# Patient Record
Sex: MALE | Race: WHITE | Employment: UNEMPLOYED | URBAN - METROPOLITAN AREA
[De-identification: names, ages, dates, MRNs, and addresses within clinical notes are randomized per-mention and may not be internally consistent; named-entity substitution may affect disease eponyms.]

---

## 2017-01-09 ENCOUNTER — APPOINTMENT (OUTPATIENT)
Dept: GENERAL RADIOLOGY | Age: 52
End: 2017-01-09
Attending: EMERGENCY MEDICINE
Payer: COMMERCIAL

## 2017-01-09 ENCOUNTER — HOSPITAL ENCOUNTER (EMERGENCY)
Age: 52
Discharge: HOME | End: 2017-01-09
Attending: EMERGENCY MEDICINE | Admitting: EMERGENCY MEDICINE
Payer: COMMERCIAL

## 2017-01-09 ENCOUNTER — APPOINTMENT (OUTPATIENT)
Dept: CT IMAGING | Age: 52
End: 2017-01-09
Attending: EMERGENCY MEDICINE
Payer: COMMERCIAL

## 2017-01-09 VITALS
OXYGEN SATURATION: 97 % | TEMPERATURE: 98.5 F | HEART RATE: 76 BPM | RESPIRATION RATE: 20 BRPM | HEIGHT: 72 IN | BODY MASS INDEX: 33.62 KG/M2 | WEIGHT: 248.24 LBS | DIASTOLIC BLOOD PRESSURE: 82 MMHG | SYSTOLIC BLOOD PRESSURE: 126 MMHG

## 2017-01-09 DIAGNOSIS — R40.4 TRANSIENT ALTERATION OF AWARENESS: ICD-10-CM

## 2017-01-09 DIAGNOSIS — R07.9 ACUTE CHEST PAIN: ICD-10-CM

## 2017-01-09 DIAGNOSIS — R55 SYNCOPE AND COLLAPSE: Primary | ICD-10-CM

## 2017-01-09 LAB
ALBUMIN SERPL BCP-MCNC: 3.6 G/DL (ref 3.5–5)
ALBUMIN/GLOB SERPL: 1.1 {RATIO} (ref 1.1–2.2)
ALP SERPL-CCNC: 110 U/L (ref 45–117)
ALT SERPL-CCNC: 38 U/L (ref 12–78)
ANION GAP BLD CALC-SCNC: 8 MMOL/L (ref 5–15)
APPEARANCE UR: CLEAR
AST SERPL W P-5'-P-CCNC: 23 U/L (ref 15–37)
BACTERIA URNS QL MICRO: NEGATIVE /HPF
BASOPHILS # BLD AUTO: 0 K/UL (ref 0–0.1)
BASOPHILS # BLD: 0 % (ref 0–1)
BILIRUB SERPL-MCNC: 0.5 MG/DL (ref 0.2–1)
BILIRUB UR QL: NEGATIVE
BUN SERPL-MCNC: 12 MG/DL (ref 6–20)
BUN/CREAT SERPL: 10 (ref 12–20)
CALCIUM SERPL-MCNC: 8.8 MG/DL (ref 8.5–10.1)
CHLORIDE SERPL-SCNC: 103 MMOL/L (ref 97–108)
CK MB CFR SERPL CALC: NORMAL % (ref 0–2.5)
CK MB SERPL-MCNC: <1 NG/ML (ref 5–25)
CK SERPL-CCNC: 106 U/L (ref 39–308)
CK SERPL-CCNC: 107 U/L (ref 39–308)
CO2 SERPL-SCNC: 27 MMOL/L (ref 21–32)
COLOR UR: NORMAL
CREAT SERPL-MCNC: 1.19 MG/DL (ref 0.7–1.3)
EOSINOPHIL # BLD: 0.1 K/UL (ref 0–0.4)
EOSINOPHIL NFR BLD: 1 % (ref 0–7)
EPITH CASTS URNS QL MICRO: NORMAL /LPF
ERYTHROCYTE [DISTWIDTH] IN BLOOD BY AUTOMATED COUNT: 13 % (ref 11.5–14.5)
GLOBULIN SER CALC-MCNC: 3.4 G/DL (ref 2–4)
GLUCOSE SERPL-MCNC: 99 MG/DL (ref 65–100)
GLUCOSE UR STRIP.AUTO-MCNC: NEGATIVE MG/DL
HCT VFR BLD AUTO: 45.1 % (ref 36.6–50.3)
HGB BLD-MCNC: 15.7 G/DL (ref 12.1–17)
HGB UR QL STRIP: NEGATIVE
KETONES UR QL STRIP.AUTO: NEGATIVE MG/DL
LEUKOCYTE ESTERASE UR QL STRIP.AUTO: NEGATIVE
LYMPHOCYTES # BLD AUTO: 22 % (ref 12–49)
LYMPHOCYTES # BLD: 2 K/UL (ref 0.8–3.5)
MCH RBC QN AUTO: 32.2 PG (ref 26–34)
MCHC RBC AUTO-ENTMCNC: 34.8 G/DL (ref 30–36.5)
MCV RBC AUTO: 92.6 FL (ref 80–99)
MONOCYTES # BLD: 0.9 K/UL (ref 0–1)
MONOCYTES NFR BLD AUTO: 10 % (ref 5–13)
NEUTS SEG # BLD: 6 K/UL (ref 1.8–8)
NEUTS SEG NFR BLD AUTO: 67 % (ref 32–75)
NITRITE UR QL STRIP.AUTO: NEGATIVE
PH UR STRIP: 6 [PH] (ref 5–8)
PLATELET # BLD AUTO: 134 K/UL (ref 150–400)
POTASSIUM SERPL-SCNC: 4.2 MMOL/L (ref 3.5–5.1)
PROT SERPL-MCNC: 7 G/DL (ref 6.4–8.2)
PROT UR STRIP-MCNC: NEGATIVE MG/DL
RBC # BLD AUTO: 4.87 M/UL (ref 4.1–5.7)
RBC #/AREA URNS HPF: NORMAL /HPF (ref 0–5)
SODIUM SERPL-SCNC: 138 MMOL/L (ref 136–145)
SP GR UR REFRACTOMETRY: 1.01 (ref 1–1.03)
TROPONIN I SERPL-MCNC: <0.04 NG/ML
TROPONIN I SERPL-MCNC: <0.04 NG/ML
UA: UC IF INDICATED,UAUC: NORMAL
UROBILINOGEN UR QL STRIP.AUTO: 1 EU/DL (ref 0.2–1)
WBC # BLD AUTO: 9 K/UL (ref 4.1–11.1)
WBC URNS QL MICRO: NORMAL /HPF (ref 0–4)

## 2017-01-09 PROCEDURE — 85025 COMPLETE CBC W/AUTO DIFF WBC: CPT | Performed by: EMERGENCY MEDICINE

## 2017-01-09 PROCEDURE — 80053 COMPREHEN METABOLIC PANEL: CPT | Performed by: EMERGENCY MEDICINE

## 2017-01-09 PROCEDURE — 82550 ASSAY OF CK (CPK): CPT | Performed by: EMERGENCY MEDICINE

## 2017-01-09 PROCEDURE — 70450 CT HEAD/BRAIN W/O DYE: CPT

## 2017-01-09 PROCEDURE — 36415 COLL VENOUS BLD VENIPUNCTURE: CPT | Performed by: EMERGENCY MEDICINE

## 2017-01-09 PROCEDURE — 74011000250 HC RX REV CODE- 250: Performed by: EMERGENCY MEDICINE

## 2017-01-09 PROCEDURE — 81001 URINALYSIS AUTO W/SCOPE: CPT | Performed by: EMERGENCY MEDICINE

## 2017-01-09 PROCEDURE — 74011250636 HC RX REV CODE- 250/636: Performed by: EMERGENCY MEDICINE

## 2017-01-09 PROCEDURE — 96374 THER/PROPH/DIAG INJ IV PUSH: CPT

## 2017-01-09 PROCEDURE — 96375 TX/PRO/DX INJ NEW DRUG ADDON: CPT

## 2017-01-09 PROCEDURE — 99285 EMERGENCY DEPT VISIT HI MDM: CPT

## 2017-01-09 PROCEDURE — 71010 XR CHEST PORT: CPT

## 2017-01-09 PROCEDURE — 96361 HYDRATE IV INFUSION ADD-ON: CPT

## 2017-01-09 PROCEDURE — 82553 CREATINE MB FRACTION: CPT | Performed by: EMERGENCY MEDICINE

## 2017-01-09 PROCEDURE — 84484 ASSAY OF TROPONIN QUANT: CPT | Performed by: EMERGENCY MEDICINE

## 2017-01-09 RX ORDER — DIAZEPAM 10 MG/2ML
5 INJECTION INTRAMUSCULAR
Status: COMPLETED | OUTPATIENT
Start: 2017-01-09 | End: 2017-01-09

## 2017-01-09 RX ORDER — PROMETHAZINE HYDROCHLORIDE 25 MG/1
25 TABLET ORAL
Qty: 12 TAB | Refills: 0 | Status: SHIPPED | OUTPATIENT
Start: 2017-01-09 | End: 2017-01-18

## 2017-01-09 RX ORDER — DIAZEPAM 5 MG/1
5 TABLET ORAL
Qty: 20 TAB | Refills: 0 | Status: SHIPPED | OUTPATIENT
Start: 2017-01-09 | End: 2017-01-18

## 2017-01-09 RX ADMIN — SODIUM CHLORIDE 10 MG: 9 INJECTION INTRAMUSCULAR; INTRAVENOUS; SUBCUTANEOUS at 13:58

## 2017-01-09 RX ADMIN — SODIUM CHLORIDE 1000 ML: 900 INJECTION, SOLUTION INTRAVENOUS at 13:58

## 2017-01-09 RX ADMIN — DIAZEPAM 5 MG: 5 INJECTION, SOLUTION INTRAMUSCULAR; INTRAVENOUS at 13:58

## 2017-01-09 NOTE — ED PROVIDER NOTES
HPI Comments: Leslee Louis is a 46 y.o. male, pmhx significant for HTN, HLD, anxiety, who presents via EMS to the ED for evaluation of syncopal episode with associated confusion and 6/10 nonradiating substernal chest tightness x 2 hours PTA. Per pt's wife, the pt lost consciousness and fell forward onto his chest. When he awoke, he was confused, his wife stating that he was unable to remember what had happened or tell them his address. Pt states that his chest tightness began after his syncopal episode. Of note, the pt did not eat anything this morning. Pt specifically denies SOB, nausea, diaphoresis, cough, BLE pain, BLE swelling, vomiting, diarrhea, fever or chills. PCP: Kimberly Martin MD      Social Hx: +tobacco (light smoker), -EtOH, -Illicit Drugs   FHx: significant for heart disease  Medication Allergies: codeine, dilaudid,morphine      There are no other complaints, changes, or physical findings at this time. The history is provided by the patient, the EMS personnel and the spouse. Past Medical History:   Diagnosis Date    Cholelithiasis 8/22/2011    High cholesterol     Hypertension     Other ill-defined conditions(799.89)      hypercholesterolemia    Psychiatric disorder      anxiety    Seasonal allergies     Status post laparoscopic cholecystectomy 8/25/11       Past Surgical History:   Procedure Laterality Date    Hx orthopaedic       back srgery, arm surgery    Pr lap,cholecystectomy  8/25/11    Hx cholecystectomy           Family History:   Problem Relation Age of Onset    Heart Disease Mother     Heart Disease Maternal Grandfather     Heart Disease Paternal Grandmother        Social History     Social History    Marital status:      Spouse name: N/A    Number of children: N/A    Years of education: N/A     Occupational History    Not on file.      Social History Main Topics    Smoking status: Light Tobacco Smoker    Smokeless tobacco: Former User     Quit date: 1/1/2001    Alcohol use No    Drug use: No    Sexual activity: Not on file     Other Topics Concern    Not on file     Social History Narrative    ** Merged History Encounter **              ALLERGIES: Codeine; Dilaudid [hydromorphone (bulk)]; and Morphine    Review of Systems   Constitutional: Negative for chills, diaphoresis and fever. HENT: Negative for congestion. Eyes: Negative. Respiratory: Positive for chest tightness. Negative for cough and shortness of breath. Gastrointestinal: Negative for diarrhea, nausea and vomiting. Endocrine: Negative for heat intolerance. Genitourinary: Negative. Musculoskeletal: Negative for back pain. No BLE pain or swelling    Allergic/Immunologic: Negative for immunocompromised state. Neurological: Positive for syncope. Negative for dizziness. Hematological: Does not bruise/bleed easily. Psychiatric/Behavioral: Negative. All other systems reviewed and are negative. Patient Vitals for the past 12 hrs:   Temp Pulse Resp BP SpO2   01/09/17 1500 - 76 20 126/82 97 %   01/09/17 1454 - 79 11 105/79 97 %   01/09/17 1400 - (!) 107 18 111/83 97 %   01/09/17 1358 - 92 27 125/78 97 %   01/09/17 1357 - 76 16 129/79 96 %   01/09/17 1300 - 83 18 (!) 138/95 95 %   01/09/17 1230 - 75 19 118/81 97 %   01/09/17 1130 - 83 16 116/69 95 %   01/09/17 1117 98.5 °F (36.9 °C) 86 12 111/73 94 %       Physical Exam   Constitutional: He is oriented to person, place, and time. He appears well-developed and well-nourished. No distress (no acute ). HENT:   Head: Normocephalic and atraumatic. Eyes: EOM are normal. Pupils are equal, round, and reactive to light. Neck: Normal range of motion. Neck supple. Cardiovascular: Normal rate, regular rhythm and normal heart sounds. Pulmonary/Chest: Effort normal and breath sounds normal. He has no wheezes. Abdominal: Soft. Bowel sounds are normal. There is no tenderness.    Musculoskeletal: Normal range of motion. He exhibits tenderness (R anterior shin tenderness). He exhibits no edema. Neurological: He is alert and oriented to person, place, and time. No cranial nerve deficit. Skin: Skin is warm and dry. Psychiatric: He has a normal mood and affect. Nursing note and vitals reviewed. MDM  Number of Diagnoses or Management Options  Acute chest pain:   Syncope and collapse:   Transient alteration of awareness:   Diagnosis management comments: DDx: CAD, anemia, arrhythmia, costochondritis, reflux, gastritis, vasovagal, dehydration, electrolyte imbalance         Amount and/or Complexity of Data Reviewed  Clinical lab tests: reviewed and ordered  Tests in the radiology section of CPT®: ordered and reviewed  Obtain history from someone other than the patient: yes (Wife  EMS)  Review and summarize past medical records: yes  Independent visualization of images, tracings, or specimens: yes    Patient Progress  Patient progress: stable        Procedures  3:48 PM  Pt's CP, HA and lightheadedness are gone. He will be d/c. Written by Louise Villanueva ED Scribe as dictated by Paco Kaye MD    LABORATORY TESTS:  Recent Results (from the past 12 hour(s))   CBC WITH AUTOMATED DIFF    Collection Time: 01/09/17 11:39 AM   Result Value Ref Range    WBC 9.0 4.1 - 11.1 K/uL    RBC 4.87 4.10 - 5.70 M/uL    HGB 15.7 12.1 - 17.0 g/dL    HCT 45.1 36.6 - 50.3 %    MCV 92.6 80.0 - 99.0 FL    MCH 32.2 26.0 - 34.0 PG    MCHC 34.8 30.0 - 36.5 g/dL    RDW 13.0 11.5 - 14.5 %    PLATELET 314 (L) 705 - 400 K/uL    NEUTROPHILS 67 32 - 75 %    LYMPHOCYTES 22 12 - 49 %    MONOCYTES 10 5 - 13 %    EOSINOPHILS 1 0 - 7 %    BASOPHILS 0 0 - 1 %    ABS. NEUTROPHILS 6.0 1.8 - 8.0 K/UL    ABS. LYMPHOCYTES 2.0 0.8 - 3.5 K/UL    ABS. MONOCYTES 0.9 0.0 - 1.0 K/UL    ABS. EOSINOPHILS 0.1 0.0 - 0.4 K/UL    ABS.  BASOPHILS 0.0 0.0 - 0.1 K/UL   METABOLIC PANEL, COMPREHENSIVE    Collection Time: 01/09/17 11:39 AM   Result Value Ref Range    Sodium 138 136 - 145 mmol/L    Potassium 4.2 3.5 - 5.1 mmol/L    Chloride 103 97 - 108 mmol/L    CO2 27 21 - 32 mmol/L    Anion gap 8 5 - 15 mmol/L    Glucose 99 65 - 100 mg/dL    BUN 12 6 - 20 MG/DL    Creatinine 1.19 0.70 - 1.30 MG/DL    BUN/Creatinine ratio 10 (L) 12 - 20      GFR est AA >60 >60 ml/min/1.73m2    GFR est non-AA >60 >60 ml/min/1.73m2    Calcium 8.8 8.5 - 10.1 MG/DL    Bilirubin, total 0.5 0.2 - 1.0 MG/DL    ALT 38 12 - 78 U/L    AST 23 15 - 37 U/L    Alk. phosphatase 110 45 - 117 U/L    Protein, total 7.0 6.4 - 8.2 g/dL    Albumin 3.6 3.5 - 5.0 g/dL    Globulin 3.4 2.0 - 4.0 g/dL    A-G Ratio 1.1 1.1 - 2.2     TROPONIN I    Collection Time: 01/09/17 11:39 AM   Result Value Ref Range    Troponin-I, Qt. <0.04 <0.05 ng/mL   CK W/ REFLX CKMB    Collection Time: 01/09/17 11:39 AM   Result Value Ref Range     39 - 308 U/L   URINALYSIS W/ REFLEX CULTURE    Collection Time: 01/09/17 11:39 AM   Result Value Ref Range    Color YELLOW/STRAW      Appearance CLEAR CLEAR      Specific gravity 1.008 1.003 - 1.030      pH (UA) 6.0 5.0 - 8.0      Protein NEGATIVE  NEG mg/dL    Glucose NEGATIVE  NEG mg/dL    Ketone NEGATIVE  NEG mg/dL    Bilirubin NEGATIVE  NEG      Blood NEGATIVE  NEG      Urobilinogen 1.0 0.2 - 1.0 EU/dL    Nitrites NEGATIVE  NEG      Leukocyte Esterase NEGATIVE  NEG      WBC 0-4 0 - 4 /hpf    RBC 0-5 0 - 5 /hpf    Epithelial cells FEW FEW /lpf    Bacteria NEGATIVE  NEG /hpf    UA:UC IF INDICATED CULTURE NOT INDICATED BY UA RESULT CNI     TROPONIN I    Collection Time: 01/09/17  1:56 PM   Result Value Ref Range    Troponin-I, Qt. <0.04 <0.05 ng/mL   CK-MB,QUANT.     Collection Time: 01/09/17  1:56 PM   Result Value Ref Range    CK - MB <1.0 <3.6 NG/ML    CK-MB Index CANNOT BE CALCULATED 0 - 2.5     CK    Collection Time: 01/09/17  1:56 PM   Result Value Ref Range     39 - 308 U/L       IMAGING RESULTS:  CXR Results  (Last 48 hours)               01/09/17 1543  XR CHEST PORT Final result Impression:  Impression:   No acute cardiopulmonary process. Narrative:  Chest AP       History: Chest pain. Dizziness. Comparison: None       Findings: The lungs are well expanded. No focal consolidation, pleural   effusion, or pneumothorax. The cardiomediastinal silhouette is unremarkable. The visualized osseous structures are unremarkable. CT Results  (Last 48 hours)               01/09/17 1430  CT HEAD WO CONT Final result    Impression:  IMPRESSION: No acute abnormality is identified. Narrative:  EXAM:  CT HEAD WO CONT       INDICATION:   syncope/ams single episode at work today confusion       COMPARISON: 2015. TECHNIQUE: Unenhanced CT of the head was performed using 5 mm images. Brain and   bone windows were generated. CT dose reduction was achieved through use of a   standardized protocol tailored for this examination and automatic exposure   control for dose modulation. FINDINGS:   The ventricles and sulci are normal in size, shape and configuration and   midline. There is no significant white matter disease. There is no intracranial   hemorrhage, extra-axial collection, mass, mass effect or midline shift. The   basilar cisterns are open. No acute infarct is identified. The bone windows   demonstrate no abnormalities. The visualized portions of the paranasal sinuses   and mastoid air cells are clear. There are 2 tiny bubbles of gas in the right   cavernous sinus most likely related to IV catheter insertion. MEDICATIONS GIVEN:  Medications   sodium chloride 0.9 % bolus infusion 1,000 mL (1,000 mL IntraVENous New Bag 1/9/17 1358)   diazePAM (VALIUM) injection 5 mg (5 mg IntraVENous Given 1/9/17 1358)   prochlorperazine (COMPAZINE) with saline injection 10 mg (10 mg IntraVENous Given 1/9/17 1358)       IMPRESSION:  1. Syncope and collapse    2. Transient alteration of awareness    3. Acute chest pain        PLAN:  1.    Current Discharge Medication List      START taking these medications    Details   promethazine (PHENERGAN) 25 mg tablet Take 1 Tab by mouth every six (6) hours as needed for Nausea. Qty: 12 Tab, Refills: 0      diazePAM (VALIUM) 5 mg tablet Take 1 Tab by mouth every eight (8) hours as needed. Max Daily Amount: 15 mg.  Qty: 20 Tab, Refills: 0         CONTINUE these medications which have NOT CHANGED    Details   ARIPiprazole (ABILIFY) 20 mg tablet Take 20 mg by mouth daily. escitalopram (LEXAPRO) 20 mg tablet Take 40 mg by mouth daily. gabapentin (NEURONTIN) 300 mg capsule Take 300 mg by mouth three (3) times daily. eszopiclone (LUNESTA) 3 mg tablet Take 3 mg by mouth nightly. acetaminophen (TYLENOL) 500 mg tablet Take 500 mg by mouth nightly as needed for Pain. Indications: ARTHRITIC PAIN      lisinopril-hydrochlorothiazide (PRINZIDE, ZESTORETIC) 20-12.5 mg per tablet Take 1 Tab by mouth daily. 1 tablet daily for 30 days per prescription      meclizine (ANTIVERT) 25 mg tablet Take  by mouth three (3) times daily as needed. rosuvastatin (CRESTOR) 20 mg tablet Take 20 mg by mouth nightly. omeprazole (PRILOSEC) 20 mg capsule Take 20 mg by mouth daily. MULTI-VITAMIN PO Take  by mouth. omega-3 fatty acids-vitamin e (FISH OIL) 1,000 mg Cap Take 4 Caps by mouth daily.          STOP taking these medications       clonazePAM (KLONOPIN) 1 mg tablet Comments:   Reason for Stoppin.   Follow-up Information     Follow up With Details Comments [address], [place] Call in 1 day  [address]  [address] [address] [phone]      Farheen Reddy MD Call in 1 day  [address] [place] [address]      Adan Collier MD In 2 days  [facility]  [phone]      [facility] EMERGENCY DEPT  If symptoms worsen [address] [facility] [address]  [phone]        Return to ED if worse   DISCHARGE NOTE:  4:04 PM  The patient's results have been reviewed with family and/or caregiver. They verbally convey their understanding and agreement of the patient's signs, symptoms, diagnosis, treatment, and prognosis. They additionally agree to follow up as recommended in the discharge instructions or to return to the Emergency Room should the patient's condition change prior to their follow-up appointment. The family and/or caregiver verbally agrees with the care-plan and all of their questions have been answered. The discharge instructions have also been provided to the them along with educational information regarding the patient's diagnosis and a list of reasons why the patient would want to return to the ER prior to their follow-up appointment should their condition change. Written by Sofía Peters ED Scribe, as dictated by Rocío Davalos MD.    This note is prepared by Sofía Peters acting as scribe for MD Rocío Carrasco MD : The scribe's documentation has been prepared under my direction and personally reviewed by me in its entirety. I confirm that the note above accurately reflects all work, treatment, procedures, and medical decision making performed by me.

## 2017-01-09 NOTE — LETTER
[address] 
[facility] EMERGENCY DEPT 
[address] [id number] [phone] Work/School Note Date: 1/9/2017 To Whom It May concern: 
 
Clemente Sánchez was seen and treated today in the emergency room by the following provider(s): 
Attending Provider: Asha Adams MD.   
 
Clemente Sánchez may return to work on 1/10/2017. Sincerely, Asha Adams MD

## 2017-01-09 NOTE — DISCHARGE INSTRUCTIONS
Chest Pain: Care Instructions  Your Care Instructions  There are many things that can cause chest pain. Some are not serious and will get better on their own in a few days. But some kinds of chest pain need more testing and treatment. Your doctor may have recommended a follow-up visit in the next 8 to 12 hours. If you are not getting better, you may need more tests or treatment. Even though your doctor has released you, you still need to watch for any problems. The doctor carefully checked you, but sometimes problems can develop later. If you have new symptoms or if your symptoms do not get better, get medical care right away. If you have worse or different chest pain or pressure that lasts more than 5 minutes or you passed out (lost consciousness), call 911 or seek other emergency help right away. A medical visit is only one step in your treatment. Even if you feel better, you still need to do what your doctor recommends, such as going to all suggested follow-up appointments and taking medicines exactly as directed. This will help you recover and help prevent future problems. How can you care for yourself at home? · Rest until you feel better. · Take your medicine exactly as prescribed. Call your doctor if you think you are having a problem with your medicine. · Do not drive after taking a prescription pain medicine. When should you call for help? Call 911 if:  · You passed out (lost consciousness). · You have severe difficulty breathing. · You have symptoms of a heart attack. These may include:  ¨ Chest pain or pressure, or a strange feeling in your chest.  ¨ Sweating. ¨ Shortness of breath. ¨ Nausea or vomiting. ¨ Pain, pressure, or a strange feeling in your back, neck, jaw, or upper belly or in one or both shoulders or arms. ¨ Lightheadedness or sudden weakness. ¨ A fast or irregular heartbeat.   After you call 911, the  may tell you to chew 1 adult-strength or 2 to 4 low-dose aspirin. Wait for an ambulance. Do not try to drive yourself. Call your doctor today if:  · You have any trouble breathing. · Your chest pain gets worse. · You are dizzy or lightheaded, or you feel like you may faint. · You are not getting better as expected. · You are having new or different chest pain. Where can you learn more? Go to http://maida-mike.info/. Enter A120 in the search box to learn more about \"Chest Pain: Care Instructions. \"  Current as of: May 27, 2016  Content Version: 11.1  © 0115-2007 [employer]. Care instructions adapted under license by [employer] (which disclaims liability or warranty for this information). If you have questions about a medical condition or this instruction, always ask your healthcare professional. [address] any warranty or liability for your use of this information. Fainting: Care Instructions  Your Care Instructions    When you faint, or pass out, you lose consciousness for a short time. A brief drop in blood flow to the brain often causes it. When you fall or lie down, more blood flows to your brain and you regain consciousness. Emotional stress, pain, or overheating--especially if you have been standing--can make you faint. In these cases, fainting is usually not serious. But fainting can be a sign of a more serious problem. Your doctor may want you to have more tests to rule out other causes. The treatment you need depends on the reason why you fainted. The doctor has checked you carefully, but problems can develop later. If you notice any problems or new symptoms, get medical treatment right away. Follow-up care is a key part of your treatment and safety. Be sure to make and go to all appointments, and call your doctor if you are having problems. It's also a good idea to know your test results and keep a list of the medicines you take.   How can you care for yourself at home?  · Drink plenty of fluids to prevent dehydration. If you have kidney, heart, or liver disease and have to limit fluids, talk with your doctor before you increase your fluid intake. When should you call for help? Call 911 anytime you think you may need emergency care. For example, call if:  · You have symptoms of a heart problem. These may include:  ¨ Chest pain or pressure. ¨ Severe trouble breathing. ¨ A fast or irregular heartbeat. ¨ Lightheadedness or sudden weakness. ¨ Coughing up pink, foamy mucus. ¨ Passing out. After you call 911, the  may tell you to chew 1 adult-strength or 2 to 4 low-dose aspirin. Wait for an ambulance. Do not try to drive yourself. · You have symptoms of a stroke. These may include:  ¨ Sudden numbness, tingling, weakness, or loss of movement in your face, arm, or leg, especially on only one side of your body. ¨ Sudden vision changes. ¨ Sudden trouble speaking. ¨ Sudden confusion or trouble understanding simple statements. ¨ Sudden problems with walking or balance. ¨ A sudden, severe headache that is different from past headaches. · You passed out (lost consciousness) again. Watch closely for changes in your health, and be sure to contact your doctor if:  · You do not get better as expected. Where can you learn more? Go to http://maida-mike.info/. Enter L603 in the search box to learn more about \"Fainting: Care Instructions. \"  Current as of: May 27, 2016  Content Version: 11.1  © 4478-5685 [employer]. Care instructions adapted under license by [employer] (which disclaims liability or warranty for this information). If you have questions about a medical condition or this instruction, always ask your healthcare professional. [address] any warranty or liability for your use of this information.

## 2017-01-09 NOTE — ED TRIAGE NOTES
Per EMS pt was at work when he did have a witnessed syncopal episode. Pt now alert and oriented x 2. Per EMS pts VSS en route to ED. pts BS=92 en route. Pt noted to be in NSR en route to ED per EMS. Upon arrival to ED pt alert to person and place. Confusion noted. Wife at bedside with pt. No acute distress noted.

## 2017-01-18 ENCOUNTER — OFFICE VISIT (OUTPATIENT)
Dept: NEUROLOGY | Age: 52
End: 2017-01-18

## 2017-01-18 VITALS
BODY MASS INDEX: 33.67 KG/M2 | WEIGHT: 248.6 LBS | HEIGHT: 72 IN | DIASTOLIC BLOOD PRESSURE: 80 MMHG | HEART RATE: 102 BPM | OXYGEN SATURATION: 98 % | SYSTOLIC BLOOD PRESSURE: 120 MMHG

## 2017-01-18 DIAGNOSIS — R40.4 TRANSIENT ALTERATION OF AWARENESS: Primary | ICD-10-CM

## 2017-01-18 NOTE — PROGRESS NOTES
NEUROLOGY NEW PATIENT CONSULATION     REFERRED BY:  Odalis Youssef MD    01/18/17    Chief Complaint   Patient presents with    New Patient     Passed out on 1/9/2017       HISTORY OF PRESENT ILLNESS  Wally Banuelos is a 46 y.o. male who presented to the neurology office for management of loss of consciousness. He did have an episode on 5/9/2017. He was at work, and he did not know that he is going to lose his consciousness but he fell down on his chest.  It just lasted for a few seconds and he was confused for 10 minutes and then he was back to his baseline. By confused he states that he was dazed as to what happened but he knew the people around and he knew where he was. He was taken to the emergency room where blood work was done which was normal.  There was no bladder or bowel incontinence or any tongue bite. He is back to his baseline. He did have CT scan of his head which was normal.    Current Outpatient Prescriptions   Medication Sig    gabapentin (NEURONTIN) 300 mg capsule Take 300 mg by mouth three (3) times daily.  eszopiclone (LUNESTA) 3 mg tablet Take 3 mg by mouth nightly.  acetaminophen (TYLENOL) 500 mg tablet Take 500 mg by mouth nightly as needed for Pain. Indications: ARTHRITIC PAIN    lisinopril-hydrochlorothiazide (PRINZIDE, ZESTORETIC) 20-12.5 mg per tablet Take 1 Tab by mouth daily. 1 tablet daily for 30 days per prescription    ARIPiprazole (ABILIFY) 20 mg tablet Take 20 mg by mouth daily.  meclizine (ANTIVERT) 25 mg tablet Take  by mouth three (3) times daily as needed.  escitalopram (LEXAPRO) 20 mg tablet Take 40 mg by mouth daily.  rosuvastatin (CRESTOR) 20 mg tablet Take 20 mg by mouth nightly.  omeprazole (PRILOSEC) 20 mg capsule Take 20 mg by mouth daily.  MULTI-VITAMIN PO Take  by mouth.  omega-3 fatty acids-vitamin e (FISH OIL) 1,000 mg Cap Take 4 Caps by mouth daily. No current facility-administered medications for this visit.       Allergies Allergen Reactions    Codeine Nausea and Vomiting    Dilaudid [Hydromorphone (Bulk)] Nausea and Vomiting     Caused N&V when given in the hospital    Morphine Rash     Past Medical History   Diagnosis Date    Cholelithiasis 8/22/2011    High cholesterol     Hypertension     Other ill-defined conditions(799.89)      hypercholesterolemia    Psychiatric disorder      anxiety    Seasonal allergies     Status post laparoscopic cholecystectomy 8/25/11     Past Surgical History   Procedure Laterality Date    Hx orthopaedic       back srgery, arm surgery    Pr lap,cholecystectomy  8/25/11    Hx cholecystectomy       Family History   Problem Relation Age of Onset    Heart Disease Mother     Headache Mother     Heart Disease Maternal Grandfather     Heart Disease Paternal Grandmother     Headache Sister     Headache Brother      Social History   Substance Use Topics    Smoking status: Light Tobacco Smoker    Smokeless tobacco: Former User     Quit date: 1/1/2001    Alcohol use No       REVIEW OF SYSTEMS:   A ten system review of constitutional, cardiovascular, respiratory, musculoskeletal, endocrine, skin, SHEENT, genitourinary, psychiatric and neurologic systems was obtained and is unremarkable with the exception of the following: Anxiety, depression, vertigo     EXAMINATION:   Visit Vitals    /80    Pulse (!) 102    Ht 6' (1.829 m)    Wt 248 lb 9.6 oz (112.8 kg)    SpO2 98%    BMI 33.72 kg/m2        General:   General appearance: Pt is in no acute distress   Distal pulses are preserved  Fundoscopic Exam: Normal    Neurological Examination:   Mental Status: AAO x3. Speech is fluent. Follows commands, has normal fund of knowledge, attention, short term recall, comprehension and insight. Cranial Nerves: Visual fields are full. PERRL, Extraocular movements are full. Facial sensation intact V1- V3. Facial movement intact, symmetric. Hearing intact to conversation. Palate elevates symmetrically. Shoulder shrug symmetric. Tongue midline. Motor: Strength is 5/5 in all 4 ext. No atrophy. Tone: Normal    Sensation: Normal to light touch    Reflexes: DTRs 1+ throughout. Plantar responses downgoing. Coordination/Cerebellar: Intact to finger-nose-finger     Gait: Romberg is negative and casual gait is normal.     Skin: No significant bruising or lacerations. Laboratory review:   Results for orders placed or performed during the hospital encounter of 01/09/17   CBC WITH AUTOMATED DIFF   Result Value Ref Range    WBC 9.0 4.1 - 11.1 K/uL    RBC 4.87 4.10 - 5.70 M/uL    HGB 15.7 12.1 - 17.0 g/dL    HCT 45.1 36.6 - 50.3 %    MCV 92.6 80.0 - 99.0 FL    MCH 32.2 26.0 - 34.0 PG    MCHC 34.8 30.0 - 36.5 g/dL    RDW 13.0 11.5 - 14.5 %    PLATELET 160 (L) 025 - 400 K/uL    NEUTROPHILS 67 32 - 75 %    LYMPHOCYTES 22 12 - 49 %    MONOCYTES 10 5 - 13 %    EOSINOPHILS 1 0 - 7 %    BASOPHILS 0 0 - 1 %    ABS. NEUTROPHILS 6.0 1.8 - 8.0 K/UL    ABS. LYMPHOCYTES 2.0 0.8 - 3.5 K/UL    ABS. MONOCYTES 0.9 0.0 - 1.0 K/UL    ABS. EOSINOPHILS 0.1 0.0 - 0.4 K/UL    ABS. BASOPHILS 0.0 0.0 - 0.1 K/UL   METABOLIC PANEL, COMPREHENSIVE   Result Value Ref Range    Sodium 138 136 - 145 mmol/L    Potassium 4.2 3.5 - 5.1 mmol/L    Chloride 103 97 - 108 mmol/L    CO2 27 21 - 32 mmol/L    Anion gap 8 5 - 15 mmol/L    Glucose 99 65 - 100 mg/dL    BUN 12 6 - 20 MG/DL    Creatinine 1.19 0.70 - 1.30 MG/DL    BUN/Creatinine ratio 10 (L) 12 - 20      GFR est AA >60 >60 ml/min/1.73m2    GFR est non-AA >60 >60 ml/min/1.73m2    Calcium 8.8 8.5 - 10.1 MG/DL    Bilirubin, total 0.5 0.2 - 1.0 MG/DL    ALT 38 12 - 78 U/L    AST 23 15 - 37 U/L    Alk.  phosphatase 110 45 - 117 U/L    Protein, total 7.0 6.4 - 8.2 g/dL    Albumin 3.6 3.5 - 5.0 g/dL    Globulin 3.4 2.0 - 4.0 g/dL    A-G Ratio 1.1 1.1 - 2.2     TROPONIN I   Result Value Ref Range    Troponin-I, Qt. <0.04 <0.05 ng/mL   CK W/ REFLX CKMB   Result Value Ref Range     39 - 308 U/L   URINALYSIS W/ REFLEX CULTURE   Result Value Ref Range    Color YELLOW/STRAW      Appearance CLEAR CLEAR      Specific gravity 1.008 1.003 - 1.030      pH (UA) 6.0 5.0 - 8.0      Protein NEGATIVE  NEG mg/dL    Glucose NEGATIVE  NEG mg/dL    Ketone NEGATIVE  NEG mg/dL    Bilirubin NEGATIVE  NEG      Blood NEGATIVE  NEG      Urobilinogen 1.0 0.2 - 1.0 EU/dL    Nitrites NEGATIVE  NEG      Leukocyte Esterase NEGATIVE  NEG      WBC 0-4 0 - 4 /hpf    RBC 0-5 0 - 5 /hpf    Epithelial cells FEW FEW /lpf    Bacteria NEGATIVE  NEG /hpf    UA:UC IF INDICATED CULTURE NOT INDICATED BY UA RESULT CNI     TROPONIN I   Result Value Ref Range    Troponin-I, Qt. <0.04 <0.05 ng/mL   CK-MB,QUANT. Result Value Ref Range    CK - MB <1.0 <3.6 NG/ML    CK-MB Index CANNOT BE CALCULATED 0 - 2.5     CK   Result Value Ref Range     39 - 308 U/L       Imaging review:  1/9/2017  X-ray chest  No acute cardiopulmonary process. CT head  No acute abnormality is identified. Documentation review:  The patient was seen in the emergency room on 1/9/2017 and I did review the notes. According to Dr. Danae Parker patient presented to the ED for evaluation of syncopal episode with associated confusion and nonradiating substernal chest tightness that lasted for 2 hours. The patient's blood work was normal and examination was normal as well. The patient's x-ray was normal and a CT scan of the head was normal.  Patient was given Valium and Compazine. Patient was diagnosed as syncope and collapse and was discharged from the hospital.    Assessment/Plan:   Nataliya Dumont is a 46 y.o. male who presented to the neurology office for management of loss of consciousness at work. There was no aura and the loss of consciousness lasted just for a few seconds and there was no postictal state. This semiology is not consistent with a seizure and the etiology for his syncope seems to be unclear.   I will get an EEG to look for any epileptiform discharges. He did see a cardiologist yesterday and he states that everything was normal.  I do plan to get the records from the cardiologist.    The patient can resume work but I did discuss with him that according to the [place] law he cannot drive for the next 6 months. Follow-up in 3 months. Thank you for allowing me to participate in the care of Mr. Gayle Winston. Please feel free to contact me if you have any questions. Babita Frye MD  Neurologist and Clinical Neurophysiologist    CC: Harper Mosquera MD  Fax: [phone]    This note will not be viewable in [address]

## 2017-01-18 NOTE — LETTER
1/18/2017 8:35 AM 
 
Patient:    Cass Kirkpatrick YOB: 1965 Date of Visit:    1/18/2017 Dear Regine Richardson MD 
 
Thank you for referring Mr. Cass Kirkpatrick to me for evaluation/treatment. Below are the relevant portions of my assessment and plan of care. NEUROLOGY NEW PATIENT CONSULATION  
 
REFERRED BY: 
Regine Richardson MD 
 
01/18/17 Chief Complaint Patient presents with  New Patient Passed out on 1/9/2017 HISTORY OF PRESENT ILLNESS 
Cass Kirkpatrick is a 46 y.o. male who presented to the neurology office for management of loss of consciousness. He did have an episode on 5/9/2017. He was at work, and he did not know that he is going to lose his consciousness but he fell down on his chest.  It just lasted for a few seconds and he was confused for 10 minutes and then he was back to his baseline. By confused he states that he was dazed as to what happened but he knew the people around and he knew where he was. He was taken to the emergency room where blood work was done which was normal.  There was no bladder or bowel incontinence or any tongue bite. He is back to his baseline. He did have CT scan of his head which was normal. 
 
Current Outpatient Prescriptions Medication Sig  
 gabapentin (NEURONTIN) 300 mg capsule Take 300 mg by mouth three (3) times daily.  eszopiclone (LUNESTA) 3 mg tablet Take 3 mg by mouth nightly.  acetaminophen (TYLENOL) 500 mg tablet Take 500 mg by mouth nightly as needed for Pain. Indications: ARTHRITIC PAIN  
 lisinopril-hydrochlorothiazide (PRINZIDE, ZESTORETIC) 20-12.5 mg per tablet Take 1 Tab by mouth daily. 1 tablet daily for 30 days per prescription  ARIPiprazole (ABILIFY) 20 mg tablet Take 20 mg by mouth daily.  meclizine (ANTIVERT) 25 mg tablet Take  by mouth three (3) times daily as needed.  escitalopram (LEXAPRO) 20 mg tablet Take 40 mg by mouth daily.  rosuvastatin (CRESTOR) 20 mg tablet Take 20 mg by mouth nightly.  omeprazole (PRILOSEC) 20 mg capsule Take 20 mg by mouth daily.  MULTI-VITAMIN PO Take  by mouth.  omega-3 fatty acids-vitamin e (FISH OIL) 1,000 mg Cap Take 4 Caps by mouth daily. No current facility-administered medications for this visit. Allergies Allergen Reactions  Codeine Nausea and Vomiting  Dilaudid [Hydromorphone (Bulk)] Nausea and Vomiting Caused N&V when given in the hospital  
 Morphine Rash Past Medical History Diagnosis Date  Cholelithiasis 8/22/2011  High cholesterol  Hypertension  Other ill-defined conditions(799.89)   
  hypercholesterolemia  Psychiatric disorder   
  anxiety  Seasonal allergies  Status post laparoscopic cholecystectomy 8/25/11 Past Surgical History Procedure Laterality Date  Hx orthopaedic    
  back srgery, arm surgery  Pr lap,cholecystectomy  8/25/11  
 Hx cholecystectomy Family History Problem Relation Age of Onset  Heart Disease Mother  Headache Mother  Heart Disease Maternal Grandfather  Heart Disease Paternal Grandmother  Headache Sister  Headache Brother Social History Substance Use Topics  Smoking status: Light Tobacco Smoker  Smokeless tobacco: Former User Quit date: 1/1/2001  Alcohol use No  
 
 
REVIEW OF SYSTEMS:  
A ten system review of constitutional, cardiovascular, respiratory, musculoskeletal, endocrine, skin, SHEENT, genitourinary, psychiatric and neurologic systems was obtained and is unremarkable with the exception of the following: Anxiety, depression, vertigo EXAMINATION:  
Visit Vitals  /80  Pulse (!) 102  Ht 6' (1.829 m)  Wt 248 lb 9.6 oz (112.8 kg)  SpO2 98%  BMI 33.72 kg/m2 General:  
General appearance: Pt is in no acute distress Distal pulses are preserved Fundoscopic Exam: Normal 
 
Neurological Examination: Mental Status: AAO x3. Speech is fluent. Follows commands, has normal fund of knowledge, attention, short term recall, comprehension and insight. Cranial Nerves: Visual fields are full. PERRL, Extraocular movements are full. Facial sensation intact V1- V3. Facial movement intact, symmetric. Hearing intact to conversation. Palate elevates symmetrically. Shoulder shrug symmetric. Tongue midline. Motor: Strength is 5/5 in all 4 ext. No atrophy. Tone: Normal 
 
Sensation: Normal to light touch Reflexes: DTRs 1+ throughout. Plantar responses downgoing. Coordination/Cerebellar: Intact to finger-nose-finger Gait: Romberg is negative and casual gait is normal.  
 
Skin: No significant bruising or lacerations. Laboratory review:  
Results for orders placed or performed during the hospital encounter of 01/09/17 CBC WITH AUTOMATED DIFF Result Value Ref Range WBC 9.0 4.1 - 11.1 K/uL  
 RBC 4.87 4.10 - 5.70 M/uL  
 HGB 15.7 12.1 - 17.0 g/dL HCT 45.1 36.6 - 50.3 % MCV 92.6 80.0 - 99.0 FL  
 MCH 32.2 26.0 - 34.0 PG  
 MCHC 34.8 30.0 - 36.5 g/dL  
 RDW 13.0 11.5 - 14.5 % PLATELET 844 (L) 152 - 400 K/uL NEUTROPHILS 67 32 - 75 % LYMPHOCYTES 22 12 - 49 % MONOCYTES 10 5 - 13 % EOSINOPHILS 1 0 - 7 % BASOPHILS 0 0 - 1 %  
 ABS. NEUTROPHILS 6.0 1.8 - 8.0 K/UL  
 ABS. LYMPHOCYTES 2.0 0.8 - 3.5 K/UL  
 ABS. MONOCYTES 0.9 0.0 - 1.0 K/UL  
 ABS. EOSINOPHILS 0.1 0.0 - 0.4 K/UL  
 ABS. BASOPHILS 0.0 0.0 - 0.1 K/UL METABOLIC PANEL, COMPREHENSIVE Result Value Ref Range Sodium 138 136 - 145 mmol/L Potassium 4.2 3.5 - 5.1 mmol/L Chloride 103 97 - 108 mmol/L  
 CO2 27 21 - 32 mmol/L Anion gap 8 5 - 15 mmol/L Glucose 99 65 - 100 mg/dL BUN 12 6 - 20 MG/DL Creatinine 1.19 0.70 - 1.30 MG/DL  
 BUN/Creatinine ratio 10 (L) 12 - 20 GFR est AA >60 >60 ml/min/1.73m2 GFR est non-AA >60 >60 ml/min/1.73m2  Calcium 8.8 8.5 - 10.1 MG/DL  
 Bilirubin, total 0.5 0.2 - 1.0 MG/DL  
 ALT 38 12 - 78 U/L  
 AST 23 15 - 37 U/L Alk. phosphatase 110 45 - 117 U/L Protein, total 7.0 6.4 - 8.2 g/dL Albumin 3.6 3.5 - 5.0 g/dL Globulin 3.4 2.0 - 4.0 g/dL A-G Ratio 1.1 1.1 - 2.2    
TROPONIN I Result Value Ref Range Troponin-I, Qt. <0.04 <0.05 ng/mL CK W/ REFLX CKMB Result Value Ref Range  39 - 308 U/L  
URINALYSIS W/ REFLEX CULTURE Result Value Ref Range Color YELLOW/STRAW Appearance CLEAR CLEAR Specific gravity 1.008 1.003 - 1.030    
 pH (UA) 6.0 5.0 - 8.0 Protein NEGATIVE  NEG mg/dL Glucose NEGATIVE  NEG mg/dL Ketone NEGATIVE  NEG mg/dL Bilirubin NEGATIVE  NEG Blood NEGATIVE  NEG Urobilinogen 1.0 0.2 - 1.0 EU/dL Nitrites NEGATIVE  NEG Leukocyte Esterase NEGATIVE  NEG    
 WBC 0-4 0 - 4 /hpf  
 RBC 0-5 0 - 5 /hpf Epithelial cells FEW FEW /lpf Bacteria NEGATIVE  NEG /hpf  
 UA:UC IF INDICATED CULTURE NOT INDICATED BY UA RESULT CNI    
TROPONIN I Result Value Ref Range Troponin-I, Qt. <0.04 <0.05 ng/mL CK-MB,QUANT. Result Value Ref Range CK - MB <1.0 <3.6 NG/ML  
 CK-MB Index CANNOT BE CALCULATED 0 - 2.5 CK Result Value Ref Range  39 - 308 U/L Imaging review: 
1/9/2017 X-ray chest 
No acute cardiopulmonary process. CT head No acute abnormality is identified. Documentation review: 
The patient was seen in the emergency room on 1/9/2017 and I did review the notes. According to Dr. Ranjana Johns patient presented to the ED for evaluation of syncopal episode with associated confusion and nonradiating substernal chest tightness that lasted for 2 hours. The patient's blood work was normal and examination was normal as well. The patient's x-ray was normal and a CT scan of the head was normal.  Patient was given Valium and Compazine. Patient was diagnosed as syncope and collapse and was discharged from the hospital. 
 
Assessment/Plan: Kari Borden is a 46 y.o. male who presented to the neurology office for management of loss of consciousness at work. There was no aura and the loss of consciousness lasted just for a few seconds and there was no postictal state. This semiology is not consistent with a seizure and the etiology for his syncope seems to be unclear. I will get an EEG to look for any epileptiform discharges. He did see a cardiologist yesterday and he states that everything was normal.  I do plan to get the records from the cardiologist. 
 
The patient can resume work but I did discuss with him that according to the [place] law he cannot drive for the next 6 months. Follow-up in 3 months. Thank you for allowing me to participate in the care of Mr. Pako Reed. Please feel free to contact me if you have any questions. Moose Arevalo MD 
Neurologist and Clinical Neurophysiologist 
 
CC: John Clark MD 
Fax: [phone] This note will not be viewable in [address] If you have questions, please do not hesitate to call me. I look forward to following Mr. Pako Reed along with you. Sincerely, Moose Arevalo MD

## 2017-01-18 NOTE — PATIENT INSTRUCTIONS
1.  EEG of the brain  2. Can resume work on 23 January 2017  3. Follow-up in 3 months      A Healthy Lifestyle: Care Instructions  Your Care Instructions  A healthy lifestyle can help you feel good, stay at a healthy weight, and have plenty of energy for both work and play. A healthy lifestyle is something you can share with your whole family. A healthy lifestyle also can lower your risk for serious health problems, such as high blood pressure, heart disease, and diabetes. You can follow a few steps listed below to improve your health and the health of your family. Follow-up care is a key part of your treatment and safety. Be sure to make and go to all appointments, and call your doctor if you are having problems. Its also a good idea to know your test results and keep a list of the medicines you take. How can you care for yourself at home? · Do not eat too much sugar, fat, or fast foods. You can still have dessert and treats now and then. The goal is moderation. · Start small to improve your eating habits. Pay attention to portion sizes, drink less juice and soda pop, and eat more fruits and vegetables. ¨ Eat a healthy amount of food. A 3-ounce serving of meat, for example, is about the size of a deck of cards. Fill the rest of your plate with vegetables and whole grains. ¨ Limit the amount of soda and sports drinks you have every day. Drink more water when you are thirsty. ¨ Eat at least 5 servings of fruits and vegetables every day. It may seem like a lot, but it is not hard to reach this goal. A serving or helping is 1 piece of fruit, 1 cup of vegetables, or 2 cups of leafy, raw vegetables. Have an apple or some carrot sticks as an afternoon snack instead of a candy bar. Try to have fruits and/or vegetables at every meal.  · Make exercise part of your daily routine. You may want to start with simple activities, such as walking, bicycling, or slow swimming. Try to be active 30 to 60 minutes every day. You do not need to do all 30 to 60 minutes all at once. For example, you can exercise 3 times a day for 10 or 20 minutes. Moderate exercise is safe for most people, but it is always a good idea to talk to your doctor before starting an exercise program.  · Keep moving. [employer] Schooling the lawn, work in the garden, or [employer]. Take the stairs instead of the elevator at work. · If you smoke, quit. People who smoke have an increased risk for heart attack, stroke, cancer, and other lung illnesses. Quitting is hard, but there are ways to boost your chance of quitting tobacco for good. ¨ Use nicotine gum, patches, or lozenges. ¨ Ask your doctor about stop-smoking programs and medicines. ¨ Keep trying. In addition to reducing your risk of diseases in the future, you will notice some benefits soon after you stop using tobacco. If you have shortness of breath or asthma symptoms, they will likely get better within a few weeks after you quit. · Limit how much alcohol you drink. Moderate amounts of alcohol (up to 2 drinks a day for men, 1 drink a day for women) are okay. But drinking too much can lead to liver problems, high blood pressure, and other health problems. Family health  If you have a family, there are many things you can do together to improve your health. · Eat meals together as a family as often as possible. · Eat healthy foods. This includes fruits, vegetables, lean meats and dairy, and whole grains. · Include your family in your fitness plan. Most people think of activities such as jogging or tennis as the way to fitness, but there are many ways you and your family can be more active. Anything that makes you breathe hard and gets your heart pumping is exercise. Here are some tips:  ¨ Walk to do errands or to take your child to school or the bus. ¨ Go for a family bike ride after dinner instead of watching TV. Where can you learn more? Go to http://maida-mike.info/.   Enter D945 in the search box to learn more about \"A Healthy Lifestyle: Care Instructions. \"  Current as of: July 26, 2016  Content Version: 11.1  © 20062289-3423 [employer]. Care instructions adapted under license by [employer] (which disclaims liability or warranty for this information). If you have questions about a medical condition or this instruction, always ask your healthcare professional. [place] [address] any warranty or liability for your use of this information.

## 2017-01-18 NOTE — MR AVS SNAPSHOT
Visit Information Date & Time Provider Department Dept. Phone Encounter #  
 1/18/2017  8:00 AM Sariah Sam MD [facility] [phone] [id number] Upcoming Health Maintenance Date Due Hepatitis C Screening 1965 Pneumococcal 19-64 Medium Risk (1 of 1 - PPSV23) 2/12/1982 DTaP/Tdap/Td series (1 - Tdap) 2/12/1984 FOBT Q 1 YEAR AGE 50-75 2/12/2013 INFLUENZA AGE 9 TO ADULT 8/1/2016 Allergies as of 1/18/2017  Review Complete On: 1/18/2017 By: Sariah Sam MD  
  
 Severity Noted Reaction Type Reactions Codeine  01/31/2011    Nausea and Vomiting Dilaudid [Hydromorphone (Bulk)]  08/25/2011    Nausea and Vomiting Caused N&V when given in the hospital  
 Morphine  01/31/2011    Rash Current Immunizations  Reviewed on 8/24/2011 Name Date Influenza Vaccine Split 1/10/2010 Not reviewed this visit Vitals BP Pulse Height(growth percentile) Weight(growth percentile) SpO2 BMI  
 120/80 (!) 102 6' (1.829 m) 248 lb 9.6 oz (112.8 kg) 98% 33.72 kg/m2 Smoking Status Light Tobacco Smoker BMI and BSA Data Body Mass Index Body Surface Area 33.72 kg/m 2 2.39 m 2 Preferred Pharmacy Pharmacy Name Phone [id number], [address] [place] [id number] [phone] Your Updated Medication List  
  
   
This list is accurate as of: 1/18/17  8:29 AM.  Always use your most recent med list.  
  
  
  
  
 ABILIFY 20 mg tablet Generic drug:  ARIPiprazole Take 20 mg by mouth daily. acetaminophen 500 mg tablet Commonly known as:  TYLENOL Take 500 mg by mouth nightly as needed for Pain. Indications: ARTHRITIC PAIN  
  
 CRESTOR 20 mg tablet Generic drug:  rosuvastatin Take 20 mg by mouth nightly. eszopiclone 3 mg tablet Commonly known as:  Isadore  Take 3 mg by mouth nightly. FISH OIL 1,000 mg Cap Generic drug:  omega-3 fatty acids-vitamin e Take 4 Caps by mouth daily. gabapentin 300 mg capsule Commonly known as:  NEURONTIN Take 300 mg by mouth three (3) times daily. LEXAPRO 20 mg tablet Generic drug:  escitalopram oxalate Take 40 mg by mouth daily. lisinopril-hydroCHLOROthiazide 20-12.5 mg per tablet Commonly known as:  Izola Boots Take 1 Tab by mouth daily. 1 tablet daily for 30 days per prescription  
  
 meclizine 25 mg tablet Commonly known as:  ANTIVERT Take  by mouth three (3) times daily as needed. MULTI-VITAMIN PO Take  by mouth. PriLOSEC 20 mg capsule Generic drug:  omeprazole Take 20 mg by mouth daily. Patient Instructions 1. EEG of the brain 2. Can resume work on 23 January 2017 3. Follow-up in 3 months A Healthy Lifestyle: Care Instructions Your Care Instructions A healthy lifestyle can help you feel good, stay at a healthy weight, and have plenty of energy for both work and play. A healthy lifestyle is something you can share with your whole family. A healthy lifestyle also can lower your risk for serious health problems, such as high blood pressure, heart disease, and diabetes. You can follow a few steps listed below to improve your health and the health of your family. Follow-up care is a key part of your treatment and safety. Be sure to make and go to all appointments, and call your doctor if you are having problems. Its also a good idea to know your test results and keep a list of the medicines you take. How can you care for yourself at home? · Do not eat too much sugar, fat, or fast foods. You can still have dessert and treats now and then. The goal is moderation. · Start small to improve your eating habits. Pay attention to portion sizes, drink less juice and soda pop, and eat more fruits and vegetables. ¨ Eat a healthy amount of food.  A 3-ounce serving of meat, for example, is about the size of a deck of cards. Fill the rest of your plate with vegetables and whole grains. ¨ Limit the amount of soda and sports drinks you have every day. Drink more water when you are thirsty. ¨ Eat at least 5 servings of fruits and vegetables every day. It may seem like a lot, but it is not hard to reach this goal. A serving or helping is 1 piece of fruit, 1 cup of vegetables, or 2 cups of leafy, raw vegetables. Have an apple or some carrot sticks as an afternoon snack instead of a candy bar. Try to have fruits and/or vegetables at every meal. 
· Make exercise part of your daily routine. You may want to start with simple activities, such as walking, bicycling, or slow swimming. Try to be active 30 to 60 minutes every day. You do not need to do all 30 to 60 minutes all at once. For example, you can exercise 3 times a day for 10 or 20 minutes. Moderate exercise is safe for most people, but it is always a good idea to talk to your doctor before starting an exercise program. 
· Keep moving. Mary Bailey the lawn, work in the garden, or [employer]. Take the stairs instead of the elevator at work. · If you smoke, quit. People who smoke have an increased risk for heart attack, stroke, cancer, and other lung illnesses. Quitting is hard, but there are ways to boost your chance of quitting tobacco for good. ¨ Use nicotine gum, patches, or lozenges. ¨ Ask your doctor about stop-smoking programs and medicines. ¨ Keep trying. In addition to reducing your risk of diseases in the future, you will notice some benefits soon after you stop using tobacco. If you have shortness of breath or asthma symptoms, they will likely get better within a few weeks after you quit. · Limit how much alcohol you drink. Moderate amounts of alcohol (up to 2 drinks a day for men, 1 drink a day for women) are okay. But drinking too much can lead to liver problems, high blood pressure, and other health problems. Family health If you have a family, there are many things you can do together to improve your health. · Eat meals together as a family as often as possible. · Eat healthy foods. This includes fruits, vegetables, lean meats and dairy, and whole grains. · Include your family in your fitness plan. Most people think of activities such as jogging or tennis as the way to fitness, but there are many ways you and your family can be more active. Anything that makes you breathe hard and gets your heart pumping is exercise. Here are some tips: 
¨ Walk to do errands or to take your child to school or the bus. ¨ Go for a family bike ride after dinner instead of watching TV. Where can you learn more? Go to http://maida-mike.info/. Enter L471 in the search box to learn more about \"A Healthy Lifestyle: Care Instructions. \" Current as of: July 26, 2016 Content Version: 11.1 © 0949-1872 [employer]. Care instructions adapted under license by [employer] (which disclaims liability or warranty for this information). If you have questions about a medical condition or this instruction, always ask your healthcare professional. [place] [address] any warranty or liability for your use of this information. Introducing [facility]! Morteza Rojas introduces Mindframe patient portal. Now you can access parts of your medical record, email your doctor's office, and request medication refills online. 1. In your internet browser, go to [email or web address] 2. Click on the First Time User? Click Here link in the Sign In box. You will see the New Member Sign Up page. 3. Enter your Mindframe Access Code exactly as it appears below. You will not need to use this code after youve completed the sign-up process. If you do not sign up before the expiration date, you must request a new code. · Mindframe Access Code: [email or web address] Expires: 4/9/2017 10:59 AM 
 
 4. Enter the last four digits of your Social Security Number (xxxx) and Date of Birth (mm/dd/yyyy) as indicated and click Submit. You will be taken to the next sign-up page. 5. Create a Onc[employer] ID. This will be your [employer] login ID and cannot be changed, so think of one that is secure and easy to remember. 6. Create a [employer] password. You can change your password at any time. 7. Enter your Password Reset Question and Answer. This can be used at a later time if you forget your password. 8. Enter your e-mail address. You will receive e-mail notification when new information is available in [address] 9. Click Sign Up. You can now view and download portions of your medical record. 10. Click the Download Summary menu link to download a portable copy of your medical information. If you have questions, please visit the Frequently Asked Questions section of the [employer] website. Remember, [employer] is NOT to be used for urgent needs. For medical emergencies, dial 911. Now available from your iPhone and Android! Please provide this summary of care documentation to your next provider. Your primary care clinician is listed as Marjorie Quintana. If you have any questions after today's visit, please call [phone].

## 2017-01-25 ENCOUNTER — HOSPITAL ENCOUNTER (OUTPATIENT)
Dept: NEUROLOGY | Age: 52
Discharge: HOME | End: 2017-01-25
Attending: PSYCHIATRY & NEUROLOGY
Payer: COMMERCIAL

## 2017-01-25 DIAGNOSIS — R40.4 TRANSIENT ALTERATION OF AWARENESS: ICD-10-CM

## 2017-01-25 PROCEDURE — 95816 EEG AWAKE AND DROWSY: CPT

## 2017-01-26 NOTE — PROCEDURES
Patient Name: Leland Palm  : 1965  Age: 46 y.o. Ordering physician: Carolyn Pickett  Date of EE2017  EEG procedure number: [id number]  Diagnosis: Syncope  Interpreting physician: Crissy Quezada MD      ELECTROENCEPHALOGRAM REPORT     PROCEDURE: EEG. CLINICAL INDICATION: The patient is a 46 y.o. male with a history of   possible seizures. EEG to rule out seizures, rule out stroke, rule out   cortical abnormality. EEG CLASSIFICATION: Essentially normal    DESCRIPTION OF THE RECORD:   The background of this recording contains a posteriorly-located occipital alpha rhythm of 11 Hz that attenuates with eye opening. Throughout the recording, there were no clear areas of focal slowing nor spike or spike-and-wave discharges seen. Hyperventilation was not performed. Photic stimulation produced a minimal driving response in the posterior head regions. During the recording, the patient did enter prolonged states of sleep with K-complexes and sleep spindles seen in the central head regions. INTERPRETATION: This is a normal electroencephalogram showing no clear focal abnormalities or epileptiform activity. A normal EEG doesn't not rule out seizures. Clinical correlation recommended.         Shani Ortiz MD  2017  9:07 PM

## 2017-02-07 ENCOUNTER — DOCUMENTATION ONLY (OUTPATIENT)
Dept: NEUROLOGY | Age: 52
End: 2017-02-07

## 2018-08-10 ENCOUNTER — HOSPITAL ENCOUNTER (EMERGENCY)
Age: 53
Discharge: HOME | End: 2018-08-10
Attending: EMERGENCY MEDICINE
Payer: COMMERCIAL

## 2018-08-10 ENCOUNTER — APPOINTMENT (OUTPATIENT)
Dept: GENERAL RADIOLOGY | Age: 53
End: 2018-08-10
Payer: COMMERCIAL

## 2018-08-10 ENCOUNTER — APPOINTMENT (OUTPATIENT)
Dept: CT IMAGING | Age: 53
End: 2018-08-10
Payer: COMMERCIAL

## 2018-08-10 VITALS
WEIGHT: 230 LBS | SYSTOLIC BLOOD PRESSURE: 116 MMHG | OXYGEN SATURATION: 95 % | HEART RATE: 79 BPM | TEMPERATURE: 97.7 F | DIASTOLIC BLOOD PRESSURE: 77 MMHG | RESPIRATION RATE: 20 BRPM | BODY MASS INDEX: 31.19 KG/M2

## 2018-08-10 DIAGNOSIS — M25.562 PAIN IN LATERAL PORTION OF LEFT KNEE: ICD-10-CM

## 2018-08-10 DIAGNOSIS — M54.2 NECK PAIN: Primary | ICD-10-CM

## 2018-08-10 DIAGNOSIS — M25.512 ACUTE PAIN OF LEFT SHOULDER: ICD-10-CM
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
## 2018-08-10 DIAGNOSIS — M54.50 ACUTE BILATERAL LOW BACK PAIN WITHOUT SCIATICA: ICD-10-CM

## 2018-08-10 LAB
ANION GAP SERPL CALC-SCNC: 5 MMOL/L (ref 5–15)
ATRIAL RATE: 75 BPM
BASOPHILS # BLD: 0.1 K/UL (ref 0–0.1)
BASOPHILS NFR BLD: 1 % (ref 0–1)
BUN SERPL-MCNC: 15 MG/DL (ref 6–20)
BUN/CREAT SERPL: 13 (ref 12–20)
CALCIUM SERPL-MCNC: 8.7 MG/DL (ref 8.5–10.1)
CALCULATED P AXIS, ECG09: 64 DEGREES
CALCULATED R AXIS, ECG10: 73 DEGREES
CALCULATED T AXIS, ECG11: 69 DEGREES
CHLORIDE SERPL-SCNC: 104 MMOL/L (ref 97–108)
CK SERPL-CCNC: 298 U/L (ref 39–308)
CO2 SERPL-SCNC: 26 MMOL/L (ref 21–32)
CREAT SERPL-MCNC: 1.18 MG/DL (ref 0.7–1.3)
DIAGNOSIS, 93000: NORMAL
DIFFERENTIAL METHOD BLD: ABNORMAL
EOSINOPHIL # BLD: 0.2 K/UL (ref 0–0.4)
EOSINOPHIL NFR BLD: 2 % (ref 0–7)
ERYTHROCYTE [DISTWIDTH] IN BLOOD BY AUTOMATED COUNT: 12.7 % (ref 11.5–14.5)
GLUCOSE SERPL-MCNC: 102 MG/DL (ref 65–100)
HCT VFR BLD AUTO: 38.9 % (ref 36.6–50.3)
HGB BLD-MCNC: 14 G/DL (ref 12.1–17)
IMM GRANULOCYTES # BLD: 0.1 K/UL (ref 0–0.04)
IMM GRANULOCYTES NFR BLD AUTO: 1 % (ref 0–0.5)
LYMPHOCYTES # BLD: 2.8 K/UL (ref 0.8–3.5)
LYMPHOCYTES NFR BLD: 27 % (ref 12–49)
MCH RBC QN AUTO: 32 PG (ref 26–34)
MCHC RBC AUTO-ENTMCNC: 36 G/DL (ref 30–36.5)
MCV RBC AUTO: 89 FL (ref 80–99)
MONOCYTES # BLD: 1 K/UL (ref 0–1)
MONOCYTES NFR BLD: 9 % (ref 5–13)
NEUTS SEG # BLD: 6.3 K/UL (ref 1.8–8)
NEUTS SEG NFR BLD: 60 % (ref 32–75)
NRBC # BLD: 0 K/UL (ref 0–0.01)
NRBC BLD-RTO: 0 PER 100 WBC
P-R INTERVAL, ECG05: 210 MS
PLATELET # BLD AUTO: 164 K/UL (ref 150–400)
PMV BLD AUTO: 10.1 FL (ref 8.9–12.9)
POTASSIUM SERPL-SCNC: 4 MMOL/L (ref 3.5–5.1)
Q-T INTERVAL, ECG07: 394 MS
QRS DURATION, ECG06: 90 MS
QTC CALCULATION (BEZET), ECG08: 439 MS
RBC # BLD AUTO: 4.37 M/UL (ref 4.1–5.7)
SODIUM SERPL-SCNC: 135 MMOL/L (ref 136–145)
TROPONIN I SERPL-MCNC: <0.05 NG/ML
VENTRICULAR RATE, ECG03: 75 BPM
WBC # BLD AUTO: 10.5 K/UL (ref 4.1–11.1)

## 2018-08-10 PROCEDURE — 73562 X-RAY EXAM OF KNEE 3: CPT

## 2018-08-10 PROCEDURE — 74011636320 HC RX REV CODE- 636/320: Performed by: EMERGENCY MEDICINE

## 2018-08-10 PROCEDURE — 85025 COMPLETE CBC W/AUTO DIFF WBC: CPT | Performed by: PHYSICIAN ASSISTANT

## 2018-08-10 PROCEDURE — 93005 ELECTROCARDIOGRAM TRACING: CPT

## 2018-08-10 PROCEDURE — 82550 ASSAY OF CK (CPK): CPT | Performed by: PHYSICIAN ASSISTANT

## 2018-08-10 PROCEDURE — 71275 CT ANGIOGRAPHY CHEST: CPT

## 2018-08-10 PROCEDURE — 84484 ASSAY OF TROPONIN QUANT: CPT | Performed by: PHYSICIAN ASSISTANT

## 2018-08-10 PROCEDURE — 72100 X-RAY EXAM L-S SPINE 2/3 VWS: CPT

## 2018-08-10 PROCEDURE — 72125 CT NECK SPINE W/O DYE: CPT

## 2018-08-10 PROCEDURE — 74011250636 HC RX REV CODE- 250/636: Performed by: EMERGENCY MEDICINE

## 2018-08-10 PROCEDURE — 74011250637 HC RX REV CODE- 250/637: Performed by: PHYSICIAN ASSISTANT

## 2018-08-10 PROCEDURE — 99285 EMERGENCY DEPT VISIT HI MDM: CPT

## 2018-08-10 PROCEDURE — 80048 BASIC METABOLIC PNL TOTAL CA: CPT | Performed by: PHYSICIAN ASSISTANT

## 2018-08-10 PROCEDURE — 36415 COLL VENOUS BLD VENIPUNCTURE: CPT | Performed by: PHYSICIAN ASSISTANT

## 2018-08-10 PROCEDURE — 73030 X-RAY EXAM OF SHOULDER: CPT

## 2018-08-10 RX ORDER — SODIUM CHLORIDE 0.9 % (FLUSH) 0.9 %
10 SYRINGE (ML) INJECTION
Status: COMPLETED | OUTPATIENT
Start: 2018-08-10 | End: 2018-08-10

## 2018-08-10 RX ORDER — ONDANSETRON 4 MG/1
4 TABLET, ORALLY DISINTEGRATING ORAL
Status: COMPLETED | OUTPATIENT
Start: 2018-08-10 | End: 2018-08-10

## 2018-08-10 RX ORDER — IBUPROFEN 600 MG/1
600 TABLET ORAL
Qty: 20 TAB | Refills: 0 | Status: SHIPPED | OUTPATIENT
Start: 2018-08-10 | End: 2018-08-30

## 2018-08-10 RX ORDER — OXYCODONE AND ACETAMINOPHEN 5; 325 MG/1; MG/1
1 TABLET ORAL
Qty: 12 TAB | Refills: 0 | Status: SHIPPED | OUTPATIENT
Start: 2018-08-10 | End: 2018-08-22

## 2018-08-10 RX ORDER — SODIUM CHLORIDE 9 MG/ML
50 INJECTION, SOLUTION INTRAVENOUS
Status: COMPLETED | OUTPATIENT
Start: 2018-08-10 | End: 2018-08-10

## 2018-08-10 RX ORDER — OXYCODONE AND ACETAMINOPHEN 5; 325 MG/1; MG/1
1 TABLET ORAL
Status: COMPLETED | OUTPATIENT
Start: 2018-08-10 | End: 2018-08-10

## 2018-08-10 RX ORDER — DIAZEPAM 5 MG/1
5 TABLET ORAL
Status: COMPLETED | OUTPATIENT
Start: 2018-08-10 | End: 2018-08-10

## 2018-08-10 RX ORDER — METHOCARBAMOL 750 MG/1
750 TABLET, FILM COATED ORAL 3 TIMES DAILY
Qty: 15 TAB | Refills: 0 | Status: SHIPPED | OUTPATIENT
Start: 2018-08-10 | End: 2018-08-15

## 2018-08-10 RX ADMIN — SODIUM CHLORIDE 50 ML/HR: 900 INJECTION, SOLUTION INTRAVENOUS at 15:51

## 2018-08-10 RX ADMIN — ONDANSETRON 4 MG: 4 TABLET, ORALLY DISINTEGRATING ORAL at 14:41

## 2018-08-10 RX ADMIN — OXYCODONE HYDROCHLORIDE AND ACETAMINOPHEN 1 TABLET: 5; 325 TABLET ORAL at 14:41

## 2018-08-10 RX ADMIN — Medication 10 ML: at 15:51

## 2018-08-10 RX ADMIN — DIAZEPAM 5 MG: 5 TABLET ORAL at 14:41

## 2018-08-10 RX ADMIN — IOPAMIDOL 100 ML: 755 INJECTION, SOLUTION INTRAVENOUS at 15:51
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
## 2018-08-10 NOTE — ED NOTES
Tramaine Mendosa, [id number] at bedside. Pt log rolled and removed from backboard. Pt then had suspenders removed for comfort.
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
## 2018-08-10 NOTE — ED NOTES
Discharge instructions reviewed with pt. Discharge instructions given to pt per PA. Pt able to return/verbalize discharge instructions. Copy of discharge instructions given. RX given to pt. Pt condition stable, respiratory status within normal limits, neuro status intact. Pt ambulatory, WC offered and pt refused, out of ER, accompanied by wife.
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
## 2019-06-23 ENCOUNTER — HOSPITAL ENCOUNTER (EMERGENCY)
Age: 54
Discharge: HOME | End: 2019-06-24
Attending: EMERGENCY MEDICINE
Payer: MEDICAID

## 2019-06-23 ENCOUNTER — APPOINTMENT (OUTPATIENT)
Dept: GENERAL RADIOLOGY | Age: 54
End: 2019-06-23
Attending: EMERGENCY MEDICINE
Payer: MEDICAID

## 2019-06-23 DIAGNOSIS — R07.89 ATYPICAL CHEST PAIN: Primary | ICD-10-CM

## 2019-06-23 DIAGNOSIS — R11.2 NON-INTRACTABLE VOMITING WITH NAUSEA, UNSPECIFIED VOMITING TYPE: ICD-10-CM

## 2019-06-23 LAB
ALBUMIN SERPL-MCNC: 3.3 G/DL (ref 3.5–5)
ALBUMIN/GLOB SERPL: 1.1 {RATIO} (ref 1.1–2.2)
ALP SERPL-CCNC: 96 U/L (ref 45–117)
ALT SERPL-CCNC: 27 U/L (ref 12–78)
AMPHET UR QL SCN: NEGATIVE
ANION GAP SERPL CALC-SCNC: 7 MMOL/L (ref 5–15)
AST SERPL-CCNC: 16 U/L (ref 15–37)
BARBITURATES UR QL SCN: NEGATIVE
BASOPHILS # BLD: 0.1 K/UL (ref 0–0.1)
BASOPHILS NFR BLD: 1 % (ref 0–1)
BENZODIAZ UR QL: NEGATIVE
BILIRUB SERPL-MCNC: 0.3 MG/DL (ref 0.2–1)
BNP SERPL-MCNC: 27 PG/ML
BUN SERPL-MCNC: 8 MG/DL (ref 6–20)
BUN/CREAT SERPL: 9 (ref 12–20)
CALCIUM SERPL-MCNC: 8 MG/DL (ref 8.5–10.1)
CANNABINOIDS UR QL SCN: NEGATIVE
CHLORIDE SERPL-SCNC: 110 MMOL/L (ref 97–108)
CO2 SERPL-SCNC: 23 MMOL/L (ref 21–32)
COCAINE UR QL SCN: NEGATIVE
CREAT SERPL-MCNC: 0.9 MG/DL (ref 0.7–1.3)
D DIMER PPP FEU-MCNC: 0.39 MG/L FEU (ref 0–0.65)
DIFFERENTIAL METHOD BLD: ABNORMAL
DRUG SCRN COMMENT,DRGCM: NORMAL
EOSINOPHIL # BLD: 0.2 K/UL (ref 0–0.4)
EOSINOPHIL NFR BLD: 3 % (ref 0–7)
ERYTHROCYTE [DISTWIDTH] IN BLOOD BY AUTOMATED COUNT: 13.1 % (ref 11.5–14.5)
GLOBULIN SER CALC-MCNC: 2.9 G/DL (ref 2–4)
GLUCOSE SERPL-MCNC: 113 MG/DL (ref 65–100)
HCT VFR BLD AUTO: 41.2 % (ref 36.6–50.3)
HGB BLD-MCNC: 14.2 G/DL (ref 12.1–17)
IMM GRANULOCYTES # BLD AUTO: 0.1 K/UL (ref 0–0.04)
IMM GRANULOCYTES NFR BLD AUTO: 1 % (ref 0–0.5)
LIPASE SERPL-CCNC: 123 U/L (ref 73–393)
LYMPHOCYTES # BLD: 2.7 K/UL (ref 0.8–3.5)
LYMPHOCYTES NFR BLD: 37 % (ref 12–49)
MCH RBC QN AUTO: 30.9 PG (ref 26–34)
MCHC RBC AUTO-ENTMCNC: 34.5 G/DL (ref 30–36.5)
MCV RBC AUTO: 89.8 FL (ref 80–99)
METHADONE UR QL: NEGATIVE
MONOCYTES # BLD: 0.6 K/UL (ref 0–1)
MONOCYTES NFR BLD: 9 % (ref 5–13)
NEUTS SEG # BLD: 3.5 K/UL (ref 1.8–8)
NEUTS SEG NFR BLD: 49 % (ref 32–75)
NRBC # BLD: 0 K/UL (ref 0–0.01)
NRBC BLD-RTO: 0 PER 100 WBC
OPIATES UR QL: NEGATIVE
PCP UR QL: NEGATIVE
PLATELET # BLD AUTO: 158 K/UL (ref 150–400)
PMV BLD AUTO: 9.9 FL (ref 8.9–12.9)
POTASSIUM SERPL-SCNC: 3.7 MMOL/L (ref 3.5–5.1)
PROT SERPL-MCNC: 6.2 G/DL (ref 6.4–8.2)
RBC # BLD AUTO: 4.59 M/UL (ref 4.1–5.7)
SODIUM SERPL-SCNC: 140 MMOL/L (ref 136–145)
TROPONIN I SERPL-MCNC: <0.05 NG/ML
WBC # BLD AUTO: 7.1 K/UL (ref 4.1–11.1)

## 2019-06-23 PROCEDURE — 80307 DRUG TEST PRSMV CHEM ANLYZR: CPT

## 2019-06-23 PROCEDURE — 74011250636 HC RX REV CODE- 250/636: Performed by: EMERGENCY MEDICINE

## 2019-06-23 PROCEDURE — 96374 THER/PROPH/DIAG INJ IV PUSH: CPT

## 2019-06-23 PROCEDURE — 36415 COLL VENOUS BLD VENIPUNCTURE: CPT

## 2019-06-23 PROCEDURE — 85025 COMPLETE CBC W/AUTO DIFF WBC: CPT

## 2019-06-23 PROCEDURE — 99284 EMERGENCY DEPT VISIT MOD MDM: CPT

## 2019-06-23 PROCEDURE — 71046 X-RAY EXAM CHEST 2 VIEWS: CPT

## 2019-06-23 PROCEDURE — 84484 ASSAY OF TROPONIN QUANT: CPT

## 2019-06-23 PROCEDURE — 83690 ASSAY OF LIPASE: CPT

## 2019-06-23 PROCEDURE — 85379 FIBRIN DEGRADATION QUANT: CPT

## 2019-06-23 PROCEDURE — 83880 ASSAY OF NATRIURETIC PEPTIDE: CPT

## 2019-06-23 PROCEDURE — 80053 COMPREHEN METABOLIC PANEL: CPT

## 2019-06-23 PROCEDURE — 93005 ELECTROCARDIOGRAM TRACING: CPT

## 2019-06-23 RX ORDER — FENTANYL CITRATE 50 UG/ML
50 INJECTION, SOLUTION INTRAMUSCULAR; INTRAVENOUS
Status: COMPLETED | OUTPATIENT
Start: 2019-06-23 | End: 2019-06-23

## 2019-06-23 RX ADMIN — FENTANYL CITRATE 50 MCG: 50 INJECTION, SOLUTION INTRAMUSCULAR; INTRAVENOUS at 22:53

## 2019-06-23 NOTE — LETTER
[address] 
[facility] EMERGENCY DEPT 
[address] [place] [address] [address] [id number] [phone] Work/School Note Date: 6/23/2019 To Whom It May concern: 
 
Maude Bonner was seen and treated today in the emergency room by the following provider(s): 
Attending Provider: Lizette Castillo MD.   
 
Maude Bonner may return to work on 6/25/19.  
 
Sincerely, 
 
 
 
 
Argenis Hines MD

## 2019-06-24 ENCOUNTER — APPOINTMENT (OUTPATIENT)
Dept: CT IMAGING | Age: 54
End: 2019-06-24
Attending: EMERGENCY MEDICINE
Payer: MEDICAID

## 2019-06-24 VITALS
SYSTOLIC BLOOD PRESSURE: 99 MMHG | WEIGHT: 231.48 LBS | RESPIRATION RATE: 15 BRPM | OXYGEN SATURATION: 88 % | BODY MASS INDEX: 31.35 KG/M2 | TEMPERATURE: 95.9 F | DIASTOLIC BLOOD PRESSURE: 71 MMHG | HEIGHT: 72 IN | HEART RATE: 78 BPM

## 2019-06-24 LAB
ATRIAL RATE: 81 BPM
CALCULATED P AXIS, ECG09: 39 DEGREES
CALCULATED R AXIS, ECG10: 19 DEGREES
CALCULATED T AXIS, ECG11: 41 DEGREES
DIAGNOSIS, 93000: NORMAL
P-R INTERVAL, ECG05: 194 MS
Q-T INTERVAL, ECG07: 374 MS
QRS DURATION, ECG06: 96 MS
QTC CALCULATION (BEZET), ECG08: 434 MS
TROPONIN I SERPL-MCNC: <0.05 NG/ML
VENTRICULAR RATE, ECG03: 81 BPM

## 2019-06-24 PROCEDURE — 74177 CT ABD & PELVIS W/CONTRAST: CPT

## 2019-06-24 PROCEDURE — 84484 ASSAY OF TROPONIN QUANT: CPT

## 2019-06-24 PROCEDURE — 74011636320 HC RX REV CODE- 636/320: Performed by: EMERGENCY MEDICINE

## 2019-06-24 PROCEDURE — 36415 COLL VENOUS BLD VENIPUNCTURE: CPT

## 2019-06-24 PROCEDURE — 96361 HYDRATE IV INFUSION ADD-ON: CPT

## 2019-06-24 PROCEDURE — 74011250636 HC RX REV CODE- 250/636: Performed by: EMERGENCY MEDICINE

## 2019-06-24 PROCEDURE — 96375 TX/PRO/DX INJ NEW DRUG ADDON: CPT

## 2019-06-24 RX ORDER — ONDANSETRON 4 MG/1
4 TABLET, ORALLY DISINTEGRATING ORAL
Qty: 10 TAB | Refills: 0 | Status: SHIPPED | OUTPATIENT
Start: 2019-06-24 | End: 2022-05-12

## 2019-06-24 RX ORDER — NAPROXEN 500 MG/1
500 TABLET ORAL
Qty: 20 TAB | Refills: 0 | Status: SHIPPED | OUTPATIENT
Start: 2019-06-24 | End: 2022-08-03

## 2019-06-24 RX ORDER — SODIUM CHLORIDE 0.9 % (FLUSH) 0.9 %
10 SYRINGE (ML) INJECTION
Status: COMPLETED | OUTPATIENT
Start: 2019-06-24 | End: 2019-06-24

## 2019-06-24 RX ORDER — ONDANSETRON 2 MG/ML
4 INJECTION INTRAMUSCULAR; INTRAVENOUS
Status: COMPLETED | OUTPATIENT
Start: 2019-06-24 | End: 2019-06-24

## 2019-06-24 RX ADMIN — Medication 10 ML: at 00:47

## 2019-06-24 RX ADMIN — SODIUM CHLORIDE 500 ML: 900 INJECTION, SOLUTION INTRAVENOUS at 00:47

## 2019-06-24 RX ADMIN — IOPAMIDOL 100 ML: 755 INJECTION, SOLUTION INTRAVENOUS at 00:47

## 2019-06-24 RX ADMIN — ONDANSETRON 4 MG: 2 INJECTION INTRAMUSCULAR; INTRAVENOUS at 00:48

## 2019-06-24 NOTE — DISCHARGE INSTRUCTIONS
Patient Education        Chest Pain: Care Instructions  Your Care Instructions    There are many things that can cause chest pain. Some are not serious and will get better on their own in a few days. But some kinds of chest pain need more testing and treatment. Your doctor may have recommended a follow-up visit in the next 8 to 12 hours. If you are not getting better, you may need more tests or treatment. Even though your doctor has released you, you still need to watch for any problems. The doctor carefully checked you, but sometimes problems can develop later. If you have new symptoms or if your symptoms do not get better, get medical care right away. If you have worse or different chest pain or pressure that lasts more than 5 minutes or you passed out (lost consciousness), call 911 or seek other emergency help right away. A medical visit is only one step in your treatment. Even if you feel better, you still need to do what your doctor recommends, such as going to all suggested follow-up appointments and taking medicines exactly as directed. This will help you recover and help prevent future problems. How can you care for yourself at home? · Rest until you feel better. · Take your medicine exactly as prescribed. Call your doctor if you think you are having a problem with your medicine. · Do not drive after taking a prescription pain medicine. When should you call for help? Call 911 if:    · You passed out (lost consciousness).     · You have severe difficulty breathing.     · You have symptoms of a heart attack. These may include:  ? Chest pain or pressure, or a strange feeling in your chest.  ? Sweating. ? Shortness of breath. ? Nausea or vomiting. ? Pain, pressure, or a strange feeling in your back, neck, jaw, or upper belly or in one or both shoulders or arms. ? Lightheadedness or sudden weakness. ? A fast or irregular heartbeat.   After you call 911, the  may tell you to chew 1 adult-strength or 2 to 4 low-dose aspirin. Wait for an ambulance. Do not try to drive yourself.    Call your doctor today if:    · You have any trouble breathing.     · Your chest pain gets worse.     · You are dizzy or lightheaded, or you feel like you may faint.     · You are not getting better as expected.     · You are having new or different chest pain. Where can you learn more? Go to http://maida-mike.info/. Enter A120 in the search box to learn more about \"Chest Pain: Care Instructions. \"  Current as of: September 23, 2018  Content Version: 11.9  © 8266-5614 [employer]. Care instructions adapted under license by [employer] (which disclaims liability or warranty for this information). If you have questions about a medical condition or this instruction, always ask your healthcare professional. [place] [address] any warranty or liability for your use of this information. Patient Education        Nausea and Vomiting: Care Instructions  Your Care Instructions    When you are nauseated, you may feel weak and sweaty and notice a lot of saliva in your mouth. Nausea often leads to vomiting. Most of the time you do not need to worry about nausea and vomiting, but they can be signs of other illnesses. Two common causes of nausea and vomiting are stomach flu and food poisoning. Nausea and vomiting from viral stomach flu will usually start to improve within 24 hours. Nausea and vomiting from food poisoning may last from 12 to 48 hours. The doctor has checked you carefully, but problems can develop later. If you notice any problems or new symptoms, get medical treatment right away. Follow-up care is a key part of your treatment and safety. Be sure to make and go to all appointments, and call your doctor if you are having problems. It's also a good idea to know your test results and keep a list of the medicines you take.   How can you care for yourself at home? · To prevent dehydration, drink plenty of fluids, enough so that your urine is light yellow or clear like water. Choose water and other caffeine-free clear liquids until you feel better. If you have kidney, heart, or liver disease and have to limit fluids, talk with your doctor before you increase the amount of fluids you drink. · Rest in bed until you feel better. · When you are able to eat, try clear soups, mild foods, and liquids until all symptoms are gone for 12 to 48 hours. Other good choices include dry toast, crackers, cooked cereal, and gelatin dessert, such as Jell-O. When should you call for help? Call 911 anytime you think you may need emergency care. For example, call if:    · You passed out (lost consciousness).    Call your doctor now or seek immediate medical care if:    · You have symptoms of dehydration, such as:  ? Dry eyes and a dry mouth. ? Passing only a little dark urine. ? Feeling thirstier than usual.     · You have new or worsening belly pain.     · You have a new or higher fever.     · You vomit blood or what looks like coffee grounds.    Watch closely for changes in your health, and be sure to contact your doctor if:    · You have ongoing nausea and vomiting.     · Your vomiting is getting worse.     · Your vomiting lasts longer than 2 days.     · You are not getting better as expected. Where can you learn more? Go to http://maida-mike.info/. Enter 25 [id number] in the search box to learn more about \"Nausea and Vomiting: Care Instructions. \"  Current as of: September 23, 2018  Content Version: 11.9  © 8110-4998 [employer]. Care instructions adapted under license by [employer] (which disclaims liability or warranty for this information).  If you have questions about a medical condition or this instruction, always ask your healthcare professional. [address] any warranty or liability for your use of this information.

## 2019-06-24 NOTE — ED NOTES
Discharge instructions given to patient by Dr. Marcos Severs. Verbalized understanding of instructions. Patient discharged without difficulty. Patient discharged in stable condition via W/C accompanied by RN.

## 2019-06-24 NOTE — ED PROVIDER NOTES
EMERGENCY DEPARTMENT HISTORY AND PHYSICAL EXAM           Date: 6/23/2019  Patient Name: Nataliya Dumont    History of Presenting Illness     Chief Complaint   Patient presents with    Chest Congestion    Nausea    Shortness of Breath       History Provided By:  Patient    HPI: Nataliya Dumont is a 62 y.o. male, with significant pmhx of anxiety, depression, copd, HTN, who presents ambulatory to the ED with c/o 2 hours of chest tightness and pain with radiation to his left upper back. Patient reports associated nausea and weakness but no actual vomiting. Patient reports having taken 3 nitro at home without any relief. Patient denies strenuous activity at the time of onset of symptoms, noted that he was getting ready for bed. Patient reports having similar episodes in the past with work-up noting clean catheterization at [facility] informed of anxiety and stress related pain. Patient noted to be on Prozac, Abilify and occasionally on Klonopin as needed. Patient reports taking all of his medications today. Patient also with history of COPD and uses rescue inhaler 2 times today. Patient specifically denies any associated fevers, chills, nausea, diarrhea, abd pain, urinary sxs, changes in BM, or headache. PCP: Macho, MD Susanne    Social Hx: denies tobacco  denies EtOH , denies Illicit Drugs    There are no other complaints, changes, or physical findings at this time. Allergies   Allergen Reactions    Codeine Nausea and Vomiting    Dilaudid [Hydromorphone (Bulk)] Nausea and Vomiting     Caused N&V when given in the hospital    Morphine Rash         Current Outpatient Medications   Medication Sig Dispense Refill    ondansetron (ZOFRAN ODT) 4 mg disintegrating tablet Take 1 Tab by mouth every eight (8) hours as needed for Nausea. 10 Tab 0    naproxen (NAPROSYN) 500 mg tablet Take 1 Tab by mouth every twelve (12) hours as needed for Pain.  20 Tab 0    gabapentin (NEURONTIN) 300 mg capsule Take 300 mg by mouth three (3) times daily.  eszopiclone (LUNESTA) 3 mg tablet Take 3 mg by mouth nightly.  acetaminophen (TYLENOL) 500 mg tablet Take 500 mg by mouth nightly as needed for Pain. Indications: ARTHRITIC PAIN      lisinopril-hydrochlorothiazide (PRINZIDE, ZESTORETIC) 20-12.5 mg per tablet Take 1 Tab by mouth daily. 1 tablet daily for 30 days per prescription      ARIPiprazole (ABILIFY) 20 mg tablet Take 20 mg by mouth daily.  meclizine (ANTIVERT) 25 mg tablet Take  by mouth three (3) times daily as needed.  escitalopram (LEXAPRO) 20 mg tablet Take 40 mg by mouth daily.  rosuvastatin (CRESTOR) 20 mg tablet Take 20 mg by mouth nightly.  omeprazole (PRILOSEC) 20 mg capsule Take 20 mg by mouth daily.  MULTI-VITAMIN PO Take  by mouth.  omega-3 fatty acids-vitamin e (FISH OIL) 1,000 mg Cap Take 4 Caps by mouth daily. Past History     Past Medical History:  Past Medical History:   Diagnosis Date    Cholelithiasis 8/22/2011    High cholesterol     Hypertension     Other ill-defined conditions(799.89)     hypercholesterolemia    Psychiatric disorder     anxiety    Seasonal allergies     Status post laparoscopic cholecystectomy 8/25/11       Past Surgical History:  Past Surgical History:   Procedure Laterality Date    HX CHOLECYSTECTOMY      HX ORTHOPAEDIC      back srgery, arm surgery    LAP,CHOLECYSTECTOMY  8/25/11       Family History:  Family History   Problem Relation Age of Onset    Heart Disease Mother     Headache Mother     Heart Disease Maternal Grandfather     Heart Disease Paternal Grandmother     Headache Sister     Headache Brother        Social History:  Social History     Tobacco Use    Smoking status: Light Tobacco Smoker    Smokeless tobacco: Former User     Quit date: 1/1/2001   Substance Use Topics    Alcohol use: No     Alcohol/week: 0.0 oz    Drug use: No       Allergies:   Allergies   Allergen Reactions    Codeine Nausea and Vomiting    Dilaudid [Hydromorphone (Bulk)] Nausea and Vomiting     Caused N&V when given in the hospital    Morphine Rash         Review of Systems   Review of Systems   Constitutional: Negative for chills and fever. HENT: Negative. Eyes: Negative. Respiratory: Positive for chest tightness and shortness of breath. Negative for cough. Cardiovascular: Positive for chest pain. Negative for leg swelling. Gastrointestinal: Negative for abdominal pain, diarrhea, nausea and vomiting. Endocrine: Negative. Genitourinary: Negative for difficulty urinating and dysuria. Musculoskeletal: Negative for myalgias. Skin: Negative. Neurological: Negative. Psychiatric/Behavioral: Negative. All other systems reviewed and are negative. Physical Exam   Physical Exam   Constitutional: He is oriented to person, place, and time. He appears well-developed and well-nourished. No distress. HENT:   Head: Normocephalic and atraumatic. Nose: Nose normal.   Mouth/Throat: No oropharyngeal exudate. Eyes: Pupils are equal, round, and reactive to light. Conjunctivae and EOM are normal.   Neck: Normal range of motion. Neck supple. No JVD present. Cardiovascular: Normal rate, regular rhythm, normal heart sounds and intact distal pulses. Exam reveals no friction rub. No murmur heard. Pulmonary/Chest: Effort normal and breath sounds normal. No stridor. No respiratory distress. He has no wheezes. He has no rales. He exhibits no tenderness and no crepitus. Abdominal: Soft. Bowel sounds are normal. He exhibits no distension. There is no tenderness. There is no rebound. Musculoskeletal: Normal range of motion. He exhibits no tenderness. Neurological: He is alert and oriented to person, place, and time. No cranial nerve deficit. Skin: Skin is warm and dry. No rash noted. He is not diaphoretic. Psychiatric: He has a normal mood and affect.  His speech is normal and behavior is normal. Judgment and thought content normal. Cognition and memory are normal.   Nursing note and vitals reviewed. Diagnostic Study Results     Labs -     Recent Results (from the past 12 hour(s))   CBC WITH AUTOMATED DIFF    Collection Time: 06/23/19  9:58 PM   Result Value Ref Range    WBC 7.1 4.1 - 11.1 K/uL    RBC 4.59 4. 10 - 5.70 M/uL    HGB 14.2 12.1 - 17.0 g/dL    HCT 41.2 36.6 - 50.3 %    MCV 89.8 80.0 - 99.0 FL    MCH 30.9 26.0 - 34.0 PG    MCHC 34.5 30.0 - 36.5 g/dL    RDW 13.1 11.5 - 14.5 %    PLATELET 095 187 - 048 K/uL    MPV 9.9 8.9 - 12.9 FL    NRBC 0.0 0  WBC    ABSOLUTE NRBC 0.00 0.00 - 0.01 K/uL    NEUTROPHILS 49 32 - 75 %    LYMPHOCYTES 37 12 - 49 %    MONOCYTES 9 5 - 13 %    EOSINOPHILS 3 0 - 7 %    BASOPHILS 1 0 - 1 %    IMMATURE GRANULOCYTES 1 (H) 0.0 - 0.5 %    ABS. NEUTROPHILS 3.5 1.8 - 8.0 K/UL    ABS. LYMPHOCYTES 2.7 0.8 - 3.5 K/UL    ABS. MONOCYTES 0.6 0.0 - 1.0 K/UL    ABS. EOSINOPHILS 0.2 0.0 - 0.4 K/UL    ABS. BASOPHILS 0.1 0.0 - 0.1 K/UL    ABS. IMM. GRANS. 0.1 (H) 0.00 - 0.04 K/UL    DF AUTOMATED     METABOLIC PANEL, COMPREHENSIVE    Collection Time: 06/23/19  9:58 PM   Result Value Ref Range    Sodium 140 136 - 145 mmol/L    Potassium 3.7 3.5 - 5.1 mmol/L    Chloride 110 (H) 97 - 108 mmol/L    CO2 23 21 - 32 mmol/L    Anion gap 7 5 - 15 mmol/L    Glucose 113 (H) 65 - 100 mg/dL    BUN 8 6 - 20 MG/DL    Creatinine 0.90 0.70 - 1.30 MG/DL    BUN/Creatinine ratio 9 (L) 12 - 20      GFR est AA >60 >60 ml/min/1.73m2    GFR est non-AA >60 >60 ml/min/1.73m2    Calcium 8.0 (L) 8.5 - 10.1 MG/DL    Bilirubin, total 0.3 0.2 - 1.0 MG/DL    ALT (SGPT) 27 12 - 78 U/L    AST (SGOT) 16 15 - 37 U/L    Alk.  phosphatase 96 45 - 117 U/L    Protein, total 6.2 (L) 6.4 - 8.2 g/dL    Albumin 3.3 (L) 3.5 - 5.0 g/dL    Globulin 2.9 2.0 - 4.0 g/dL    A-G Ratio 1.1 1.1 - 2.2     TROPONIN I    Collection Time: 06/23/19  9:58 PM   Result Value Ref Range    Troponin-I, Qt. <0.05 <0.05 ng/mL   DRUG SCREEN, URINE Collection Time: 06/23/19 10:33 PM   Result Value Ref Range    AMPHETAMINES NEGATIVE  NEG      BARBITURATES NEGATIVE  NEG      BENZODIAZEPINES NEGATIVE  NEG      COCAINE NEGATIVE  NEG      METHADONE NEGATIVE  NEG      OPIATES NEGATIVE  NEG      PCP(PHENCYCLIDINE) NEGATIVE  NEG      THC (TH-CANNABINOL) NEGATIVE  NEG      Drug screen comment (NOTE)    D DIMER    Collection Time: 06/23/19 10:33 PM   Result Value Ref Range    D-dimer 0.39 0.00 - 0.65 mg/L FEU   LIPASE    Collection Time: 06/23/19 10:33 PM   Result Value Ref Range    Lipase 123 73 - 393 U/L   NT-PRO BNP    Collection Time: 06/23/19 10:33 PM   Result Value Ref Range    NT pro-BNP 27 <125 PG/ML   TROPONIN I    Collection Time: 06/24/19 12:04 AM   Result Value Ref Range    Troponin-I, Qt. <0.05 <0.05 ng/mL       Radiologic Studies -   CT ABD PELV W CONT   Final Result   IMPRESSION:   No acute intraperitoneal process is identified. XR CHEST PA LAT   Final Result   IMPRESSION: No acute intrathoracic disease. CT Results  (Last 48 hours)               06/24/19 0105  CT ABD PELV W CONT Final result    Impression:  IMPRESSION:   No acute intraperitoneal process is identified. Narrative:  EXAM: CT ABD PELV W CONT   History: Vomiting   INDICATION: vomiting       COMPARISON: 10/25/2011        CONTRAST: 100 mL of Isovue-370. TECHNIQUE:    Following the uneventful intravenous administration of contrast, thin axial   images were obtained through the abdomen and pelvis. Coronal and sagittal   reconstructions were generated. Oral contrast was not administered. CT dose   reduction was achieved through use of a standardized protocol tailored for this   examination and automatic exposure control for dose modulation. FINDINGS:    LUNG BASES: Clear. INCIDENTALLY IMAGED HEART AND MEDIASTINUM: Unremarkable. LIVER: Fatty steatosis. Portal vein is patent   GALLBLADDER: Absent   SPLEEN: No mass. PANCREAS: No mass or ductal dilatation. ADRENALS: Unremarkable. KIDNEYS: No mass, calculus, or hydronephrosis. STOMACH: Unremarkable. SMALL BOWEL: No dilatation or wall thickening. COLON: No dilatation or wall thickening. APPENDIX: Unremarkable. PERITONEUM: No ascites or pneumoperitoneum. RETROPERITONEUM: No lymphadenopathy or aortic aneurysm. REPRODUCTIVE ORGANS: Unremarkable   URINARY BLADDER: No mass or calculus. BONES: Degenerative changes in the lumbar spine. Canal stenosis at L2-3, L4-5   and likely at L5-S1 as well. ADDITIONAL COMMENTS: N/A               CXR Results  (Last 48 hours)               06/23/19 2216  XR CHEST PA LAT Final result    Impression:  IMPRESSION: No acute intrathoracic disease. Narrative:  CLINICAL HISTORY: Chest pain    INDICATION: Chest pain       COMPARISON: 1/9/2017       FINDINGS:    PA and lateral views of the chest are obtained. The cardiopericardial silhouette is within normal limits. There is no pleural   effusion, pneumothorax or focal consolidation present. Medical Decision Making   I am the first provider for this patient. I reviewed the vital signs, available nursing notes, past medical history, past surgical history, family history and social history. Vital Signs-Reviewed the patient's vital signs. Patient Vitals for the past 12 hrs:   Temp Pulse Resp BP SpO2   06/23/19 2134 95.9 °F (35.5 °C) 85 18 117/74 98 %       Pulse Oximetry Analysis - 98% on RA    Cardiac Monitor:   Rate: 85 bpm  Rhythm: NSR    Records Reviewed: Nursing Notes, Old Medical Records, Previous Radiology Studies and Previous Laboratory Studies    Provider Notes (Medical Decision Making):     DDX:  Acs, pe, pericarditis, dissection, pleurisy, anxiety, pancreatitis, copd exacerbation    Plan:  Ekg, labs, cxr, d dimer, bnp, lipase    Impression:  atypicial chest pain    ED Course:   Initial assessment performed.  The patients presenting problems have been discussed, and they are in agreement with the care plan formulated and outlined with them. I have encouraged them to ask questions as they arise throughout their visit. I reviewed our electronic medical record system for any past medical records that were available that may contribute to the patients current condition, the nursing notes and and vital signs from today's visit    Nursing notes will be reviewed as they become available in realtime while the pt has been in the ED. Reynaldo Arango MD    EKG interpretation 4297: NSR, nl Axis, rate 81; , , QTc 434; no acute ischemia; Reynaldo Arango MD    I personally reviewed pt's imaging. Official read by radiology noted above. Reynaldo Arango MD    PROGRESS NOTE:  Pt noted to have dry heaving with nausea. Cardiac enzymes negative x2. With new symptom development will evaluate further with CT scan and provide antiemetic. Reynaldo Arango MD             1:47 AM  Progress note:  Pt noted to be feeling better, ready for discharge. Discussed lab and imaging findings with pt and/or family, specifically noting negative cardiac enzymes and ct. Pt will follow up with PCP and cardiology as instructed. All questions have been answered, pt voiced understanding and agreement with plan. Specific return precautions provided in addition to instructions for pt to return to the ED immediately should sx worsen at any time. Reynaldo Arango MD      Critical Care Time:     none      Diagnosis     Clinical Impression:   1. Atypical chest pain    2. Non-intractable vomiting with nausea, unspecified vomiting type        PLAN:  1. Current Discharge Medication List      START taking these medications    Details   ondansetron (ZOFRAN ODT) 4 mg disintegrating tablet Take 1 Tab by mouth every eight (8) hours as needed for Nausea. Qty: 10 Tab, Refills: 0      naproxen (NAPROSYN) 500 mg tablet Take 1 Tab by mouth every twelve (12) hours as needed for Pain. Qty: 20 Tab, Refills: 0           2. Follow-up Information     Follow up With Specialties Details Why Contact Info    Josué Keith MD Cardiology Schedule an appointment as soon as possible for a visit in 2 days  [address]  [id number]  [address] [phone]      [facility] EMERGENCY DEPT Emergency Medicine  As needed [address] [facility]  [address] [facility]  [phone]        Return to ED if worse     Disposition:  1:47 AM  The patient's results have been reviewed with family and/or caregiver. They verbally convey their understanding and agreement of the patient's signs, symptoms, diagnosis, treatment and prognosis and additionally agree to follow up as recommended in the discharge instructions or to return to the Emergency Room should the patient's condition change prior to their follow-up appointment. The family and/or caregiver verbally agrees with the care-plan and all of their questions have been answered. The discharge instructions have also been provided to the them with educational information regarding the patient's diagnosis as well a list of reasons why the patient would want to return to the ER prior to their follow-up appointment should their condition change. Jose Rubio MD          Please note that this dictation was completed with [employer], the computer voice recognition software. Quite often unanticipated grammatical, syntax, homophones, and other interpretive errors are inadvertently transcribed by the computer software. Please disregard these errors. Please excuse any errors that have escaped final proofreading    This note will not be viewable in [employer].

## 2019-06-24 NOTE — ED NOTES
Patient on stretcher, in gown with wife at bedside. Placed on cardiac monitor with bp, pulse ox. Pt 98% on RA. Speaking full sentences, no obvious difficulty breathing. RR regular, unlabored. Pt has taken 3 nitro, denies any cardiac hx, reports radiation into left shoulder 8/10 pain.

## 2019-09-23 ENCOUNTER — APPOINTMENT (OUTPATIENT)
Dept: GENERAL RADIOLOGY | Age: 54
End: 2019-09-23
Attending: EMERGENCY MEDICINE
Payer: MEDICAID

## 2019-09-23 ENCOUNTER — HOSPITAL ENCOUNTER (EMERGENCY)
Age: 54
Discharge: HOME | End: 2019-09-23
Attending: EMERGENCY MEDICINE
Payer: MEDICAID

## 2019-09-23 VITALS
TEMPERATURE: 97.9 F | DIASTOLIC BLOOD PRESSURE: 89 MMHG | RESPIRATION RATE: 18 BRPM | OXYGEN SATURATION: 92 % | SYSTOLIC BLOOD PRESSURE: 118 MMHG | HEART RATE: 87 BPM | WEIGHT: 259.04 LBS | BODY MASS INDEX: 35.13 KG/M2

## 2019-09-23 DIAGNOSIS — R07.9 CHEST PAIN, UNSPECIFIED TYPE: Primary | ICD-10-CM

## 2019-09-23 LAB
ALBUMIN SERPL-MCNC: 3.3 G/DL (ref 3.5–5)
ALBUMIN/GLOB SERPL: 1 {RATIO} (ref 1.1–2.2)
ALP SERPL-CCNC: 93 U/L (ref 45–117)
ALT SERPL-CCNC: 31 U/L (ref 12–78)
ANION GAP SERPL CALC-SCNC: 4 MMOL/L (ref 5–15)
AST SERPL-CCNC: 21 U/L (ref 15–37)
BASOPHILS # BLD: 0.1 K/UL (ref 0–0.1)
BASOPHILS NFR BLD: 1 % (ref 0–1)
BILIRUB SERPL-MCNC: 0.4 MG/DL (ref 0.2–1)
BNP SERPL-MCNC: 30 PG/ML
BUN SERPL-MCNC: 11 MG/DL (ref 6–20)
BUN/CREAT SERPL: 9 (ref 12–20)
CALCIUM SERPL-MCNC: 8.3 MG/DL (ref 8.5–10.1)
CHLORIDE SERPL-SCNC: 109 MMOL/L (ref 97–108)
CK MB CFR SERPL CALC: 1.1 % (ref 0–2.5)
CK MB SERPL-MCNC: 1.9 NG/ML (ref 5–25)
CK SERPL-CCNC: 180 U/L (ref 39–308)
CO2 SERPL-SCNC: 26 MMOL/L (ref 21–32)
CREAT SERPL-MCNC: 1.18 MG/DL (ref 0.7–1.3)
DIFFERENTIAL METHOD BLD: ABNORMAL
EOSINOPHIL # BLD: 0.1 K/UL (ref 0–0.4)
EOSINOPHIL NFR BLD: 1 % (ref 0–7)
ERYTHROCYTE [DISTWIDTH] IN BLOOD BY AUTOMATED COUNT: 14.6 % (ref 11.5–14.5)
GLOBULIN SER CALC-MCNC: 3.4 G/DL (ref 2–4)
GLUCOSE SERPL-MCNC: 92 MG/DL (ref 65–100)
HCT VFR BLD AUTO: 41.2 % (ref 36.6–50.3)
HGB BLD-MCNC: 13.8 G/DL (ref 12.1–17)
IMM GRANULOCYTES # BLD AUTO: 0.1 K/UL (ref 0–0.04)
IMM GRANULOCYTES NFR BLD AUTO: 2 % (ref 0–0.5)
INR PPP: 1 (ref 0.9–1.1)
LYMPHOCYTES # BLD: 1.7 K/UL (ref 0.8–3.5)
LYMPHOCYTES NFR BLD: 24 % (ref 12–49)
MCH RBC QN AUTO: 30.5 PG (ref 26–34)
MCHC RBC AUTO-ENTMCNC: 33.5 G/DL (ref 30–36.5)
MCV RBC AUTO: 91.2 FL (ref 80–99)
MONOCYTES # BLD: 0.8 K/UL (ref 0–1)
MONOCYTES NFR BLD: 11 % (ref 5–13)
NEUTS SEG # BLD: 4.4 K/UL (ref 1.8–8)
NEUTS SEG NFR BLD: 61 % (ref 32–75)
NRBC # BLD: 0 K/UL (ref 0–0.01)
NRBC BLD-RTO: 0 PER 100 WBC
PLATELET # BLD AUTO: 148 K/UL (ref 150–400)
PMV BLD AUTO: 10.6 FL (ref 8.9–12.9)
POTASSIUM SERPL-SCNC: 3.8 MMOL/L (ref 3.5–5.1)
PROT SERPL-MCNC: 6.7 G/DL (ref 6.4–8.2)
PROTHROMBIN TIME: 10.5 SEC (ref 9–11.1)
RBC # BLD AUTO: 4.52 M/UL (ref 4.1–5.7)
SODIUM SERPL-SCNC: 139 MMOL/L (ref 136–145)
TROPONIN I SERPL-MCNC: <0.05 NG/ML
TROPONIN I SERPL-MCNC: <0.05 NG/ML
WBC # BLD AUTO: 7.1 K/UL (ref 4.1–11.1)

## 2019-09-23 PROCEDURE — 36415 COLL VENOUS BLD VENIPUNCTURE: CPT

## 2019-09-23 PROCEDURE — 83880 ASSAY OF NATRIURETIC PEPTIDE: CPT

## 2019-09-23 PROCEDURE — 93005 ELECTROCARDIOGRAM TRACING: CPT

## 2019-09-23 PROCEDURE — 74011250636 HC RX REV CODE- 250/636: Performed by: EMERGENCY MEDICINE

## 2019-09-23 PROCEDURE — 74011250637 HC RX REV CODE- 250/637: Performed by: EMERGENCY MEDICINE

## 2019-09-23 PROCEDURE — 80053 COMPREHEN METABOLIC PANEL: CPT

## 2019-09-23 PROCEDURE — 82550 ASSAY OF CK (CPK): CPT

## 2019-09-23 PROCEDURE — 85610 PROTHROMBIN TIME: CPT

## 2019-09-23 PROCEDURE — 84484 ASSAY OF TROPONIN QUANT: CPT

## 2019-09-23 PROCEDURE — 71046 X-RAY EXAM CHEST 2 VIEWS: CPT

## 2019-09-23 PROCEDURE — 96375 TX/PRO/DX INJ NEW DRUG ADDON: CPT

## 2019-09-23 PROCEDURE — 99285 EMERGENCY DEPT VISIT HI MDM: CPT

## 2019-09-23 PROCEDURE — 96374 THER/PROPH/DIAG INJ IV PUSH: CPT

## 2019-09-23 PROCEDURE — 85025 COMPLETE CBC W/AUTO DIFF WBC: CPT

## 2019-09-23 RX ORDER — FENTANYL CITRATE 50 UG/ML
50 INJECTION, SOLUTION INTRAMUSCULAR; INTRAVENOUS
Status: COMPLETED | OUTPATIENT
Start: 2019-09-23 | End: 2019-09-23

## 2019-09-23 RX ORDER — KETOROLAC TROMETHAMINE 30 MG/ML
15 INJECTION, SOLUTION INTRAMUSCULAR; INTRAVENOUS
Status: COMPLETED | OUTPATIENT
Start: 2019-09-23 | End: 2019-09-23

## 2019-09-23 RX ORDER — NITROGLYCERIN 0.4 MG/1
0.4 TABLET SUBLINGUAL
Status: COMPLETED | OUTPATIENT
Start: 2019-09-23 | End: 2019-09-23

## 2019-09-23 RX ADMIN — FENTANYL CITRATE 50 MCG: 50 INJECTION, SOLUTION INTRAMUSCULAR; INTRAVENOUS at 15:53

## 2019-09-23 RX ADMIN — NITROGLYCERIN 0.4 MG: 0.4 TABLET, ORALLY DISINTEGRATING SUBLINGUAL at 13:21

## 2019-09-23 RX ADMIN — KETOROLAC TROMETHAMINE 15 MG: 30 INJECTION, SOLUTION INTRAMUSCULAR at 13:47

## 2019-09-23 NOTE — DISCHARGE INSTRUCTIONS

## 2019-09-23 NOTE — CONSULTS
Cardiology Consult Note    CC: CP    PCP:Susanne Pritchard MD  Reason for consult:  CP  Requesting MD:  Dr. Lakisha Stephenson. Admit Date: 9/23/2019   Today's Date: 9/23/2019   Cardiologist:  In [place]. Cardiac Assessment/Plan:   CP: very long h/o intermittent CP with no obstructive CAD. Normal ECG; initial enzymes negative. Will give dose GI cocktail. If second set of cardiac enzymes are negative or unchanged, & ECG remains stable, would d/c pt to f/u with his cardiologist in [facility]. Consider tox screen/EtOH level. The patient reports cardiac cath ([facility]) in 2018 that showed no CAD (No CAD here @ cath 2016). He's not working; on disability for COPD/\"coronary spasm\" per pt. He notes frequent CP for \"a long time\" every day to every few days; can last hours; epigastric pressure, to L arm/jaw. No PND, orthopnea, palpitations, pre-syncope, syncope, peripheral edema. Occ EtOH; No tobacco.    ECG: normal  Tele: sinus  CXR: no acute changes. Notable labs: Nl CK/trop x1; proBNP normal; K/Cr nl. Notable prior cardiac history:  Caths 2016 and 2018 without CAD.     He reports unremarkable EGD this yr.      ______________________________________________________________________  Patient Active Problem List    Diagnosis Date Noted    Status post laparoscopic cholecystectomy     Abdominal pain 08/24/2011        Past Medical History:   Diagnosis Date    Cholelithiasis 8/22/2011    High cholesterol     Hypertension     Other ill-defined conditions(799.89)     hypercholesterolemia    Psychiatric disorder     anxiety    Seasonal allergies     Status post laparoscopic cholecystectomy 8/25/11      Past Surgical History:   Procedure Laterality Date    HX CHOLECYSTECTOMY      HX ORTHOPAEDIC      back srgery, arm surgery    LAP,CHOLECYSTECTOMY  8/25/11     Allergies   Allergen Reactions    Codeine Nausea and Vomiting    Dilaudid [Hydromorphone (Bulk)] Nausea and Vomiting     Caused N&V when given in the hospital    Morphine Rash      Family History   Problem Relation Age of Onset    Heart Disease Mother     Headache Mother     Heart Disease Maternal Grandfather     Heart Disease Paternal Grandmother     Headache Sister     Headache Brother       Social History     Socioeconomic History    Marital status:      Spouse name: Not on file    Number of children: Not on file    Years of education: Not on file    Highest education level: Not on file   Occupational History    Not on file   Social Needs    Financial resource strain: Not on file    Food insecurity:     Worry: Not on file     Inability: Not on file    Transportation needs:     Medical: Not on file     Non-medical: Not on file   Tobacco Use    Smoking status: Light Tobacco Smoker    Smokeless tobacco: Former User     Quit date: 1/1/2001   Substance and Sexual Activity    Alcohol use: No     Alcohol/week: 0.0 standard drinks    Drug use: No    Sexual activity: Not on file   Lifestyle    Physical activity:     Days per week: Not on file     Minutes per session: Not on file    Stress: Not on file   Relationships    Social connections:     Talks on phone: Not on file     Gets together: Not on file     Attends Denominational service: Not on file     Active member of club or organization: Not on file     Attends meetings of clubs or organizations: Not on file     Relationship status: Not on file    Intimate partner violence:     Fear of current or ex partner: Not on file     Emotionally abused: Not on file     Physically abused: Not on file     Forced sexual activity: Not on file   Other Topics Concern    Not on file   Social History Narrative    ** Merged History Encounter **          Current Facility-Administered Medications   Medication Dose Route Frequency    fentaNYL citrate (PF) injection 50 mcg  50 mcg IntraVENous NOW     Current Outpatient Medications   Medication Sig    ondansetron (ZOFRAN ODT) 4 mg disintegrating tablet Take 1 Tab by mouth every eight (8) hours as needed for Nausea.  naproxen (NAPROSYN) 500 mg tablet Take 1 Tab by mouth every twelve (12) hours as needed for Pain.  gabapentin (NEURONTIN) 300 mg capsule Take 300 mg by mouth three (3) times daily.  eszopiclone (LUNESTA) 3 mg tablet Take 3 mg by mouth nightly.  acetaminophen (TYLENOL) 500 mg tablet Take 500 mg by mouth nightly as needed for Pain. Indications: ARTHRITIC PAIN    lisinopril-hydrochlorothiazide (PRINZIDE, ZESTORETIC) 20-12.5 mg per tablet Take 1 Tab by mouth daily. 1 tablet daily for 30 days per prescription    ARIPiprazole (ABILIFY) 20 mg tablet Take 20 mg by mouth daily.  meclizine (ANTIVERT) 25 mg tablet Take  by mouth three (3) times daily as needed.  escitalopram (LEXAPRO) 20 mg tablet Take 40 mg by mouth daily.  rosuvastatin (CRESTOR) 20 mg tablet Take 20 mg by mouth nightly.  omeprazole (PRILOSEC) 20 mg capsule Take 20 mg by mouth daily.  MULTI-VITAMIN PO Take  by mouth.  omega-3 fatty acids-vitamin e (FISH OIL) 1,000 mg Cap Take 4 Caps by mouth daily. Prior to Admission Medications:  Prior to Admission medications    Medication Sig Start Date End Date Taking? Authorizing Provider   ondansetron (ZOFRAN ODT) 4 mg disintegrating tablet Take 1 Tab by mouth every eight (8) hours as needed for Nausea. 6/24/19   Walter Crisostomo MD   naproxen (NAPROSYN) 500 mg tablet Take 1 Tab by mouth every twelve (12) hours as needed for Pain. 6/24/19   Walter Crisostomo MD   gabapentin (NEURONTIN) 300 mg capsule Take 300 mg by mouth three (3) times daily. Provider, Historical   eszopiclone (LUNESTA) 3 mg tablet Take 3 mg by mouth nightly. Provider, Historical   acetaminophen (TYLENOL) 500 mg tablet Take 500 mg by mouth nightly as needed for Pain. Indications: ARTHRITIC PAIN    Provider, Historical   lisinopril-hydrochlorothiazide (PRINZIDE, ZESTORETIC) 20-12.5 mg per tablet Take 1 Tab by mouth daily.  1 tablet daily for 30 days per prescription    Provider, Historical   ARIPiprazole (ABILIFY) 20 mg tablet Take 20 mg by mouth daily. Susanne Pritchard MD   meclizine (ANTIVERT) 25 mg tablet Take  by mouth three (3) times daily as needed. Susanne Pritchard MD   escitalopram (LEXAPRO) 20 mg tablet Take 40 mg by mouth daily. Susanne Pritchard MD   rosuvastatin (CRESTOR) 20 mg tablet Take 20 mg by mouth nightly. Susanne Pritchard MD   omeprazole (PRILOSEC) 20 mg capsule Take 20 mg by mouth daily. Susanne Pritchard MD   MULTI-VITAMIN PO Take  by mouth. Susanne Pritchard MD   omega-3 fatty acids-vitamin e (FISH OIL) 1,000 mg Cap Take 4 Caps by mouth daily. Susanne Pritchard MD        Review of Symptoms: Except as noted in HPI, patient denies recent fever or chills, nausea, vomiting, diarrhea, hemoptysis, hematemesis, dysuria, myalgias, focal neurologic symptoms, ecchymosis, angioedema, odynophagia, dysphagia, sore throat, earache,rash, melena, hematochezia, depression, GERD, cold intolerance, petechia, bleeding gums, or significant weight loss. 24 hr VS reviewed, overall VSSAF  Temp (24hrs), Av.9 °F (36.6 °C), Min:97.9 °F (36.6 °C), Max:97.9 °F (36.6 °C)    Patient Vitals for the past 8 hrs:   Pulse   19 1400 87   19 1315 96   19 1207 (!) 108     Patient Vitals for the past 8 hrs:   Resp   19 1400 18   19 1315 22   19 1207 18     Patient Vitals for the past 8 hrs:   BP   19 1400 118/89   19 1315 113/82   19 1307 119/79   19 1207 131/79     No intake or output data in the 24 hours ending 19 1540      Physical Exam (complete single organ system exam)    Cons: The patient is no distress. Appears stated age. HEENT: Injected conjunctivae and palate. No xanthelasma. Neck: Flat JVP without appreciable HJR. Resp: Normal respiratory effort with clear lungs bilaterally. CV: Regular rate and rhythm. PMI not palpated. Normal S1,S2  No gallop or rubs appreciated.   No murmur appreciated. Intact carotid upstroke bilaterally without appreciated bruits. Abdominal aorta not palpated; no abdominal bruit noted. Intact pedal pulses. No peripheral edema. GI: No abd mass noted, soft; no organomegaly noted. Bowel sounds present. Obese. Muscular:  No significant kyphosis. Strength WNL for age. Ext: No cyanosis, clubbing, or stigmata of peripheral embolization. Derm: No ulcers or stasis dermatitis of lower extremities. Neuro: Alert and oriented x 3;  Grossly non-focal. Normal mood and affect. Labs:   Recent Results (from the past 24 hour(s))   CBC WITH AUTOMATED DIFF    Collection Time: 09/23/19 12:27 PM   Result Value Ref Range    WBC 7.1 4.1 - 11.1 K/uL    RBC 4.52 4.10 - 5.70 M/uL    HGB 13.8 12.1 - 17.0 g/dL    HCT 41.2 36.6 - 50.3 %    MCV 91.2 80.0 - 99.0 FL    MCH 30.5 26.0 - 34.0 PG    MCHC 33.5 30.0 - 36.5 g/dL    RDW 14.6 (H) 11.5 - 14.5 %    PLATELET 377 (L) 115 - 400 K/uL    MPV 10.6 8.9 - 12.9 FL    NRBC 0.0 0  WBC    ABSOLUTE NRBC 0.00 0.00 - 0.01 K/uL    NEUTROPHILS 61 32 - 75 %    LYMPHOCYTES 24 12 - 49 %    MONOCYTES 11 5 - 13 %    EOSINOPHILS 1 0 - 7 %    BASOPHILS 1 0 - 1 %    IMMATURE GRANULOCYTES 2 (H) 0.0 - 0.5 %    ABS. NEUTROPHILS 4.4 1.8 - 8.0 K/UL    ABS. LYMPHOCYTES 1.7 0.8 - 3.5 K/UL    ABS. MONOCYTES 0.8 0.0 - 1.0 K/UL    ABS. EOSINOPHILS 0.1 0.0 - 0.4 K/UL    ABS. BASOPHILS 0.1 0.0 - 0.1 K/UL    ABS. IMM.  GRANS. 0.1 (H) 0.00 - 0.04 K/UL    DF AUTOMATED     PROTHROMBIN TIME + INR    Collection Time: 09/23/19 12:27 PM   Result Value Ref Range    INR 1.0 0.9 - 1.1      Prothrombin time 10.5 9.0 - 90.0 sec   METABOLIC PANEL, COMPREHENSIVE    Collection Time: 09/23/19 12:27 PM   Result Value Ref Range    Sodium 139 136 - 145 mmol/L    Potassium 3.8 3.5 - 5.1 mmol/L    Chloride 109 (H) 97 - 108 mmol/L    CO2 26 21 - 32 mmol/L    Anion gap 4 (L) 5 - 15 mmol/L    Glucose 92 65 - 100 mg/dL    BUN 11 6 - 20 MG/DL    Creatinine 1.18 0.70 - 1.30 MG/DL BUN/Creatinine ratio 9 (L) 12 - 20      GFR est AA >60 >60 ml/min/1.73m2    GFR est non-AA >60 >60 ml/min/1.73m2    Calcium 8.3 (L) 8.5 - 10.1 MG/DL    Bilirubin, total 0.4 0.2 - 1.0 MG/DL    ALT (SGPT) 31 12 - 78 U/L    AST (SGOT) 21 15 - 37 U/L    Alk.  phosphatase 93 45 - 117 U/L    Protein, total 6.7 6.4 - 8.2 g/dL    Albumin 3.3 (L) 3.5 - 5.0 g/dL    Globulin 3.4 2.0 - 4.0 g/dL    A-G Ratio 1.0 (L) 1.1 - 2.2     TROPONIN I    Collection Time: 09/23/19 12:27 PM   Result Value Ref Range    Troponin-I, Qt. <0.05 <0.05 ng/mL   CK W/ CKMB & INDEX    Collection Time: 09/23/19 12:27 PM   Result Value Ref Range     39 - 308 U/L    CK - MB 1.9 <3.6 NG/ML    CK-MB Index 1.1 0.0 - 2.5     NT-PRO BNP    Collection Time: 09/23/19 12:27 PM   Result Value Ref Range    NT pro-BNP 30 <125 PG/ML     Recent Labs     09/23/19  1227      CKMB 1.9   CKNDX 1.1   TROIQ <0.05       Recent Labs     09/23/19  1227      K 3.8   *   CO2 26   BUN 11   CREA 1.18   GFRAA >60   GLU 92   CA 8.3*   ALB 3.3*   WBC 7.1   HGB 13.8   HCT 41.2   *       Giovanni Cockayne, MD

## 2019-09-23 NOTE — ED PROVIDER NOTES
EMERGENCY DEPARTMENT HISTORY AND PHYSICAL EXAM      Date: 9/23/2019  Patient Name: Raphael Garber  Patient Age and Sex: 62 y.o. male     History of Presenting Illness     Chief Complaint   Patient presents with    Chest Pain     arrives via ems from home. chest pain today hx of angina, ems gave 1 in nitro and 324 mg asa. History Provided By: Patient    HPI: Raphael Garber is a 57-year-old male with a history of hypertension, coronary vasospasms, COPD presenting for chest pain. Patient states that around 11:30 AM today he was driving in his car when he had chest pain described as chest pressure midsternal as well as substernal radiating into his left arm. It was associated with shortness of breath and diaphoresis. When EMS arrived, patient's pain was a 9 out of 10 and he was diaphoretic. They gave him 324 of aspirin as well as Nitropaste and his pain has improved down to 6 out of 10. Patient denies any cough, leg swelling, fevers at home. States that his cardiologist is in [place]. His last stress test was 3 months ago and that looked okay except for coronary vasospasm. Had a cardiac cath a little over a year ago. There are no other complaints, changes, or physical findings at this time. PCP: Other, MD Susanne    No current facility-administered medications on file prior to encounter. Current Outpatient Medications on File Prior to Encounter   Medication Sig Dispense Refill    ondansetron (ZOFRAN ODT) 4 mg disintegrating tablet Take 1 Tab by mouth every eight (8) hours as needed for Nausea. 10 Tab 0    naproxen (NAPROSYN) 500 mg tablet Take 1 Tab by mouth every twelve (12) hours as needed for Pain. 20 Tab 0    gabapentin (NEURONTIN) 300 mg capsule Take 300 mg by mouth three (3) times daily.  eszopiclone (LUNESTA) 3 mg tablet Take 3 mg by mouth nightly.  acetaminophen (TYLENOL) 500 mg tablet Take 500 mg by mouth nightly as needed for Pain.  Indications: ARTHRITIC PAIN  lisinopril-hydrochlorothiazide (PRINZIDE, ZESTORETIC) 20-12.5 mg per tablet Take 1 Tab by mouth daily. 1 tablet daily for 30 days per prescription      ARIPiprazole (ABILIFY) 20 mg tablet Take 20 mg by mouth daily.  meclizine (ANTIVERT) 25 mg tablet Take  by mouth three (3) times daily as needed.  escitalopram (LEXAPRO) 20 mg tablet Take 40 mg by mouth daily.  rosuvastatin (CRESTOR) 20 mg tablet Take 20 mg by mouth nightly.  omeprazole (PRILOSEC) 20 mg capsule Take 20 mg by mouth daily.  MULTI-VITAMIN PO Take  by mouth.  omega-3 fatty acids-vitamin e (FISH OIL) 1,000 mg Cap Take 4 Caps by mouth daily. Past History     Past Medical History:  Past Medical History:   Diagnosis Date    Cholelithiasis 8/22/2011    High cholesterol     Hypertension     Other ill-defined conditions(799.89)     hypercholesterolemia    Psychiatric disorder     anxiety    Seasonal allergies     Status post laparoscopic cholecystectomy 8/25/11       Past Surgical History:  Past Surgical History:   Procedure Laterality Date    HX CHOLECYSTECTOMY      HX ORTHOPAEDIC      back srgery, arm surgery    LAP,CHOLECYSTECTOMY  8/25/11       Family History:  Family History   Problem Relation Age of Onset    Heart Disease Mother     Headache Mother     Heart Disease Maternal Grandfather     Heart Disease Paternal Grandmother     Headache Sister     Headache Brother        Social History:  Social History     Tobacco Use    Smoking status: Light Tobacco Smoker    Smokeless tobacco: Former User     Quit date: 1/1/2001   Substance Use Topics    Alcohol use: No     Alcohol/week: 0.0 standard drinks    Drug use: No       Allergies: Allergies   Allergen Reactions    Codeine Nausea and Vomiting    Dilaudid [Hydromorphone (Bulk)] Nausea and Vomiting     Caused N&V when given in the hospital    Morphine Rash         Review of Systems   Review of Systems   Constitutional: Positive for diaphoresis. Negative for chills and fever. Respiratory: Positive for shortness of breath. Negative for cough. Cardiovascular: Positive for chest pain. Gastrointestinal: Negative for abdominal pain, constipation, diarrhea, nausea and vomiting. Neurological: Negative for weakness and numbness. All other systems reviewed and are negative. Physical Exam   Physical Exam   Constitutional: He is oriented to person, place, and time. He appears well-developed and well-nourished. Slightly diaphoretic   HENT:   Head: Normocephalic and atraumatic. Eyes: Conjunctivae and EOM are normal.   Neck: Normal range of motion. Neck supple. Cardiovascular: Regular rhythm. Tachycardic   Pulmonary/Chest: Effort normal and breath sounds normal. No respiratory distress. Abdominal: Soft. He exhibits no distension. There is tenderness. Minimal epigastric pain   Musculoskeletal: Normal range of motion. Neurological: He is alert and oriented to person, place, and time. Skin: Skin is warm and dry. Psychiatric: He has a normal mood and affect. Nursing note and vitals reviewed. Diagnostic Study Results     Labs -     No results found for this or any previous visit (from the past 12 hour(s)). Radiologic Studies -   XR CHEST PA LAT   Final Result   IMPRESSION: No acute cardiopulmonary process        CT Results  (Last 48 hours)    None        CXR Results  (Last 48 hours)    None            Medical Decision Making   I am the first provider for this patient. I reviewed the vital signs, available nursing notes, past medical history, past surgical history, family history and social history. Vital Signs-Reviewed the patient's vital signs. No data found. Records Reviewed: Nursing Notes and Old Medical Records    Provider Notes (Medical Decision Making):   Patient presents with CP. DDx:  ACS, Aortic dissection, PNA, PE, PTX, pericarditis, myocarditis, GERD, costochondritis, anxiety.   Concerned for angina vs vasospasm given the HPI and Physical exam. Will obtain labs, CXR, EKG and get Cardiology Consult PRN. - I have re-examined the patient and the patient denies chest pain on re-examination. The patient has had onset of chest pain greater than 8 hours and one negative set of cardiac enzymes or 2 negative sets of cardiac enzymes in the ER during this visit. The diagnosis, follow up, return instructions, test results, x-rays and medications have been discussed and reviewed with the patient. The patient has been given the opportunity to ask questions. The patient  expresses understanding of the diagnosis, follow-up and return instructions. The patient agrees to follow up with primary care or cardiology as directed and to return immediately if the chest pain worsens. The patient expresses understanding that although cardiac testing at this time is negative, a cardiac problem could still be present and that a follow-up appointment for further evaluation and risk factor modification is necessary to complete the evaluation of this complaint. ED Course:   Initial assessment performed. The patients presenting problems have been discussed, and they are in agreement with the care plan formulated and outlined with them. I have encouraged them to ask questions as they arise throughout their visit. ED Course as of Sep 27 2215   Mon Sep 23, 2019   1451 Spoke with Dr. Lion Fregoso nurse who states that he is currently in a case but will evaluate patient when he is done. [JS]      ED Course User Index  [JS] Harish Persaud MD       Disposition:    Discharge Note:  The patient has been re-evaluated and is ready for discharge. Reviewed available results with patient. Counseled patient on diagnosis and care plan. Patient has expressed understanding, and all questions have been answered. Patient agrees with plan and agrees to follow up as recommended, or to return to the ED if their symptoms worsen.  Discharge instructions have been provided and explained to the patient, along with reasons to return to the ED. Diagnosis     Clinical Impression:   1. Chest pain, unspecified type        Attestations:  Twan Hernández M.D. Please note that this dictation was completed with [employer], the computer voice recognition software. Quite often unanticipated grammatical, syntax, homophones, and other interpretive errors are inadvertently transcribed by the computer software. Please disregard these errors. Please excuse any errors that have escaped final proofreading. Thank you.

## 2019-09-24 LAB
ATRIAL RATE: 104 BPM
CALCULATED P AXIS, ECG09: 40 DEGREES
CALCULATED R AXIS, ECG10: 12 DEGREES
CALCULATED T AXIS, ECG11: 41 DEGREES
DIAGNOSIS, 93000: NORMAL
P-R INTERVAL, ECG05: 208 MS
Q-T INTERVAL, ECG07: 324 MS
QRS DURATION, ECG06: 78 MS
QTC CALCULATION (BEZET), ECG08: 426 MS
VENTRICULAR RATE, ECG03: 104 BPM

## 2019-11-06 ENCOUNTER — HOSPITAL ENCOUNTER (EMERGENCY)
Age: 54
Discharge: HOME | End: 2019-11-06
Attending: EMERGENCY MEDICINE
Payer: MEDICAID

## 2019-11-06 ENCOUNTER — APPOINTMENT (OUTPATIENT)
Dept: GENERAL RADIOLOGY | Age: 54
End: 2019-11-06
Attending: EMERGENCY MEDICINE
Payer: MEDICAID

## 2019-11-06 VITALS
TEMPERATURE: 98.7 F | DIASTOLIC BLOOD PRESSURE: 80 MMHG | HEART RATE: 68 BPM | OXYGEN SATURATION: 97 % | BODY MASS INDEX: 34.86 KG/M2 | SYSTOLIC BLOOD PRESSURE: 133 MMHG | RESPIRATION RATE: 15 BRPM | WEIGHT: 257.06 LBS

## 2019-11-06 DIAGNOSIS — R07.9 CHEST PAIN, UNSPECIFIED TYPE: Primary | ICD-10-CM

## 2019-11-06 DIAGNOSIS — N17.9 AKI (ACUTE KIDNEY INJURY) (HCC): ICD-10-CM

## 2019-11-06 LAB
ALBUMIN SERPL-MCNC: 3.3 G/DL (ref 3.5–5)
ALBUMIN/GLOB SERPL: 0.9 {RATIO} (ref 1.1–2.2)
ALP SERPL-CCNC: 102 U/L (ref 45–117)
ALT SERPL-CCNC: 38 U/L (ref 12–78)
ANION GAP SERPL CALC-SCNC: 7 MMOL/L (ref 5–15)
AST SERPL-CCNC: 25 U/L (ref 15–37)
BASOPHILS # BLD: 0.1 K/UL (ref 0–0.1)
BASOPHILS NFR BLD: 1 % (ref 0–1)
BILIRUB SERPL-MCNC: 0.5 MG/DL (ref 0.2–1)
BUN SERPL-MCNC: 10 MG/DL (ref 6–20)
BUN/CREAT SERPL: 7 (ref 12–20)
CALCIUM SERPL-MCNC: 9.1 MG/DL (ref 8.5–10.1)
CHLORIDE SERPL-SCNC: 106 MMOL/L (ref 97–108)
CK SERPL-CCNC: 149 U/L (ref 39–308)
CO2 SERPL-SCNC: 25 MMOL/L (ref 21–32)
CREAT SERPL-MCNC: 1.42 MG/DL (ref 0.7–1.3)
DIFFERENTIAL METHOD BLD: ABNORMAL
EOSINOPHIL # BLD: 0.2 K/UL (ref 0–0.4)
EOSINOPHIL NFR BLD: 2 % (ref 0–7)
ERYTHROCYTE [DISTWIDTH] IN BLOOD BY AUTOMATED COUNT: 14.4 % (ref 11.5–14.5)
GLOBULIN SER CALC-MCNC: 3.6 G/DL (ref 2–4)
GLUCOSE SERPL-MCNC: 97 MG/DL (ref 65–100)
HCT VFR BLD AUTO: 46.5 % (ref 36.6–50.3)
HGB BLD-MCNC: 15.5 G/DL (ref 12.1–17)
IMM GRANULOCYTES # BLD AUTO: 0.1 K/UL (ref 0–0.04)
IMM GRANULOCYTES NFR BLD AUTO: 1 % (ref 0–0.5)
LYMPHOCYTES # BLD: 2.6 K/UL (ref 0.8–3.5)
LYMPHOCYTES NFR BLD: 33 % (ref 12–49)
MCH RBC QN AUTO: 31.1 PG (ref 26–34)
MCHC RBC AUTO-ENTMCNC: 33.3 G/DL (ref 30–36.5)
MCV RBC AUTO: 93.2 FL (ref 80–99)
MONOCYTES # BLD: 0.8 K/UL (ref 0–1)
MONOCYTES NFR BLD: 10 % (ref 5–13)
NEUTS SEG # BLD: 4.2 K/UL (ref 1.8–8)
NEUTS SEG NFR BLD: 53 % (ref 32–75)
NRBC # BLD: 0 K/UL (ref 0–0.01)
NRBC BLD-RTO: 0 PER 100 WBC
PLATELET # BLD AUTO: 158 K/UL (ref 150–400)
PMV BLD AUTO: 9.8 FL (ref 8.9–12.9)
POTASSIUM SERPL-SCNC: 5.1 MMOL/L (ref 3.5–5.1)
PROT SERPL-MCNC: 6.9 G/DL (ref 6.4–8.2)
RBC # BLD AUTO: 4.99 M/UL (ref 4.1–5.7)
SODIUM SERPL-SCNC: 138 MMOL/L (ref 136–145)
TROPONIN I SERPL-MCNC: <0.05 NG/ML
TROPONIN I SERPL-MCNC: <0.05 NG/ML
WBC # BLD AUTO: 7.9 K/UL (ref 4.1–11.1)

## 2019-11-06 PROCEDURE — 82550 ASSAY OF CK (CPK): CPT

## 2019-11-06 PROCEDURE — 96374 THER/PROPH/DIAG INJ IV PUSH: CPT

## 2019-11-06 PROCEDURE — 93005 ELECTROCARDIOGRAM TRACING: CPT

## 2019-11-06 PROCEDURE — 85025 COMPLETE CBC W/AUTO DIFF WBC: CPT

## 2019-11-06 PROCEDURE — 74011250636 HC RX REV CODE- 250/636: Performed by: EMERGENCY MEDICINE

## 2019-11-06 PROCEDURE — 36415 COLL VENOUS BLD VENIPUNCTURE: CPT

## 2019-11-06 PROCEDURE — 74011250637 HC RX REV CODE- 250/637: Performed by: EMERGENCY MEDICINE

## 2019-11-06 PROCEDURE — 99285 EMERGENCY DEPT VISIT HI MDM: CPT

## 2019-11-06 PROCEDURE — 84484 ASSAY OF TROPONIN QUANT: CPT

## 2019-11-06 PROCEDURE — 80053 COMPREHEN METABOLIC PANEL: CPT

## 2019-11-06 PROCEDURE — 71045 X-RAY EXAM CHEST 1 VIEW: CPT

## 2019-11-06 RX ORDER — ASPIRIN 325 MG
325 TABLET ORAL ONCE
Status: COMPLETED | OUTPATIENT
Start: 2019-11-06 | End: 2019-11-06

## 2019-11-06 RX ORDER — KETOROLAC TROMETHAMINE 30 MG/ML
15 INJECTION, SOLUTION INTRAMUSCULAR; INTRAVENOUS
Status: COMPLETED | OUTPATIENT
Start: 2019-11-06 | End: 2019-11-06

## 2019-11-06 RX ORDER — NITROGLYCERIN 0.4 MG/1
0.4 TABLET SUBLINGUAL
Status: DISCONTINUED | OUTPATIENT
Start: 2019-11-06 | End: 2019-11-07 | Stop reason: HOSPADM

## 2019-11-06 RX ADMIN — ASPIRIN 325 MG: 325 TABLET, FILM COATED ORAL at 18:43

## 2019-11-06 RX ADMIN — NITROGLYCERIN 0.4 MG: 0.4 TABLET, ORALLY DISINTEGRATING SUBLINGUAL at 18:47

## 2019-11-06 RX ADMIN — SODIUM CHLORIDE 1000 ML: 900 INJECTION, SOLUTION INTRAVENOUS at 20:16

## 2019-11-06 RX ADMIN — KETOROLAC TROMETHAMINE 15 MG: 30 INJECTION, SOLUTION INTRAMUSCULAR at 19:56

## 2019-11-07 LAB
ATRIAL RATE: 83 BPM
ATRIAL RATE: 87 BPM
CALCULATED P AXIS, ECG09: 45 DEGREES
CALCULATED P AXIS, ECG09: 53 DEGREES
CALCULATED R AXIS, ECG10: 13 DEGREES
CALCULATED R AXIS, ECG10: 35 DEGREES
CALCULATED T AXIS, ECG11: 54 DEGREES
CALCULATED T AXIS, ECG11: 58 DEGREES
DIAGNOSIS, 93000: NORMAL
DIAGNOSIS, 93000: NORMAL
P-R INTERVAL, ECG05: 190 MS
P-R INTERVAL, ECG05: 202 MS
Q-T INTERVAL, ECG07: 366 MS
Q-T INTERVAL, ECG07: 374 MS
QRS DURATION, ECG06: 86 MS
QRS DURATION, ECG06: 86 MS
QTC CALCULATION (BEZET), ECG08: 439 MS
QTC CALCULATION (BEZET), ECG08: 440 MS
VENTRICULAR RATE, ECG03: 83 BPM
VENTRICULAR RATE, ECG03: 87 BPM

## 2019-11-07 NOTE — ED NOTES
Bedside and Verbal shift change report given to [id number] and Ender Wright RN (oncoming nurse) by Hodan Gar RN (offgoing nurse). Report included the following information SBAR, ED Summary, MAR and Recent Results.

## 2019-11-07 NOTE — ED PROVIDER NOTES
EMERGENCY DEPARTMENT HISTORY AND PHYSICAL EXAM      Date: 11/6/2019  Patient Name: Cari Anton    History of Presenting Illness     Chief Complaint   Patient presents with    Chest Pain     substernal chest pain into left arm and back, sob and nausea since yesterday       History Provided By: Patient    HPI: Cari Anton, 62 y.o. male with PMHx significant for coronary vasospasm, hypertension, who presents with chest pain. Patient states that his pain began at 3:30 PM.  Notes that it is a tightness located in the left side of his chest with radiation to his left shoulder. Has associated shortness of breath and nausea but no vomiting or diaphoresis. He states that he has a history of coronary vasospasm and the symptoms feel similar. He is followed by a cardiologist in [facility]. He has not taken anything for his symptoms. He denies any history of DVT or PE. No recent long travel, surgery, hospital stay. No fever, cough or cold symptoms. PCP: Other, MD Susanne    There are no other complaints, changes, or physical findings at this time. Current Facility-Administered Medications   Medication Dose Route Frequency Provider Last Rate Last Dose    nitroglycerin (NITROSTAT) tablet 0.4 mg  0.4 mg SubLINGual Q5MIN PRN Anthony Almonte MD   0.4 mg at 11/06/19 2577     Current Outpatient Medications   Medication Sig Dispense Refill    ondansetron (ZOFRAN ODT) 4 mg disintegrating tablet Take 1 Tab by mouth every eight (8) hours as needed for Nausea. 10 Tab 0    naproxen (NAPROSYN) 500 mg tablet Take 1 Tab by mouth every twelve (12) hours as needed for Pain. 20 Tab 0    gabapentin (NEURONTIN) 300 mg capsule Take 300 mg by mouth three (3) times daily.  eszopiclone (LUNESTA) 3 mg tablet Take 3 mg by mouth nightly.  acetaminophen (TYLENOL) 500 mg tablet Take 500 mg by mouth nightly as needed for Pain.  Indications: ARTHRITIC PAIN      lisinopril-hydrochlorothiazide (Ludmila Walter) 20-12.5 mg per tablet Take 1 Tab by mouth daily. 1 tablet daily for 30 days per prescription      ARIPiprazole (ABILIFY) 20 mg tablet Take 20 mg by mouth daily.  meclizine (ANTIVERT) 25 mg tablet Take  by mouth three (3) times daily as needed.  escitalopram (LEXAPRO) 20 mg tablet Take 40 mg by mouth daily.  rosuvastatin (CRESTOR) 20 mg tablet Take 20 mg by mouth nightly.  omeprazole (PRILOSEC) 20 mg capsule Take 20 mg by mouth daily.  MULTI-VITAMIN PO Take  by mouth.  omega-3 fatty acids-vitamin e (FISH OIL) 1,000 mg Cap Take 4 Caps by mouth daily. Past History     Past Medical History:  Past Medical History:   Diagnosis Date    Cholelithiasis 8/22/2011    High cholesterol     Hypertension     Other ill-defined conditions(799.89)     hypercholesterolemia    Psychiatric disorder     anxiety    Seasonal allergies     Status post laparoscopic cholecystectomy 8/25/11     Past Surgical History:  Past Surgical History:   Procedure Laterality Date    HX CHOLECYSTECTOMY      HX ORTHOPAEDIC      back srgery, arm surgery    LAP,CHOLECYSTECTOMY  8/25/11     Family History:  Family History   Problem Relation Age of Onset    Heart Disease Mother     Headache Mother     Heart Disease Maternal Grandfather     Heart Disease Paternal Grandmother     Headache Sister     Headache Brother      Social History:  Social History     Tobacco Use    Smoking status: Light Tobacco Smoker    Smokeless tobacco: Former User     Quit date: 1/1/2001   Substance Use Topics    Alcohol use: No     Alcohol/week: 0.0 standard drinks    Drug use: No     Allergies: Allergies   Allergen Reactions    Codeine Nausea and Vomiting    Dilaudid [Hydromorphone (Bulk)] Nausea and Vomiting     Caused N&V when given in the hospital    Morphine Rash     Review of Systems   Review of Systems   Constitutional: Negative for chills and fever. HENT: Negative for congestion, rhinorrhea and sore throat.     Respiratory: Positive for shortness of breath. Negative for cough. Cardiovascular: Positive for chest pain. Gastrointestinal: Negative for abdominal pain, nausea and vomiting. Genitourinary: Negative for dysuria and urgency. Skin: Negative for rash. Neurological: Negative for dizziness, light-headedness and headaches. All other systems reviewed and are negative. Physical Exam   Physical Exam   Constitutional: He is oriented to person, place, and time. He appears well-developed and well-nourished. No distress. HENT:   Head: Normocephalic and atraumatic. Eyes: Pupils are equal, round, and reactive to light. Conjunctivae and EOM are normal.   Neck: Normal range of motion. Cardiovascular: Normal rate, regular rhythm and intact distal pulses. Pulmonary/Chest: Effort normal and breath sounds normal. No stridor. No respiratory distress. Abdominal: Soft. He exhibits no distension. There is no tenderness. Musculoskeletal: Normal range of motion. Neurological: He is alert and oriented to person, place, and time. Skin: Skin is warm and dry. Psychiatric: He has a normal mood and affect. Nursing note and vitals reviewed. Diagnostic Study Results   Labs -     Recent Results (from the past 12 hour(s))   EKG, 12 LEAD, INITIAL    Collection Time: 11/06/19  4:36 PM   Result Value Ref Range    Ventricular Rate 83 BPM    Atrial Rate 83 BPM    P-R Interval 202 ms    QRS Duration 86 ms    Q-T Interval 374 ms    QTC Calculation (Bezet) 439 ms    Calculated P Axis 53 degrees    Calculated R Axis 35 degrees    Calculated T Axis 54 degrees    Diagnosis       Normal sinus rhythm  Normal ECG  When compared with ECG of 23-SEP-2019 12:18,  No significant change was found     CBC WITH AUTOMATED DIFF    Collection Time: 11/06/19  4:46 PM   Result Value Ref Range    WBC 7.9 4.1 - 11.1 K/uL    RBC 4.99 4. 10 - 5.70 M/uL    HGB 15.5 12.1 - 17.0 g/dL    HCT 46.5 36.6 - 50.3 %    MCV 93.2 80.0 - 99.0 FL    MCH 31.1 26.0 - 34.0 PG    MCHC 33.3 30.0 - 36.5 g/dL    RDW 14.4 11.5 - 14.5 %    PLATELET 272 141 - 624 K/uL    MPV 9.8 8.9 - 12.9 FL    NRBC 0.0 0  WBC    ABSOLUTE NRBC 0.00 0.00 - 0.01 K/uL    NEUTROPHILS 53 32 - 75 %    LYMPHOCYTES 33 12 - 49 %    MONOCYTES 10 5 - 13 %    EOSINOPHILS 2 0 - 7 %    BASOPHILS 1 0 - 1 %    IMMATURE GRANULOCYTES 1 (H) 0.0 - 0.5 %    ABS. NEUTROPHILS 4.2 1.8 - 8.0 K/UL    ABS. LYMPHOCYTES 2.6 0.8 - 3.5 K/UL    ABS. MONOCYTES 0.8 0.0 - 1.0 K/UL    ABS. EOSINOPHILS 0.2 0.0 - 0.4 K/UL    ABS. BASOPHILS 0.1 0.0 - 0.1 K/UL    ABS. IMM. GRANS. 0.1 (H) 0.00 - 0.04 K/UL    DF AUTOMATED     METABOLIC PANEL, COMPREHENSIVE    Collection Time: 11/06/19  4:46 PM   Result Value Ref Range    Sodium 138 136 - 145 mmol/L    Potassium 5.1 3.5 - 5.1 mmol/L    Chloride 106 97 - 108 mmol/L    CO2 25 21 - 32 mmol/L    Anion gap 7 5 - 15 mmol/L    Glucose 97 65 - 100 mg/dL    BUN 10 6 - 20 MG/DL    Creatinine 1.42 (H) 0.70 - 1.30 MG/DL    BUN/Creatinine ratio 7 (L) 12 - 20      GFR est AA >60 >60 ml/min/1.73m2    GFR est non-AA 52 (L) >60 ml/min/1.73m2    Calcium 9.1 8.5 - 10.1 MG/DL    Bilirubin, total 0.5 0.2 - 1.0 MG/DL    ALT (SGPT) 38 12 - 78 U/L    AST (SGOT) 25 15 - 37 U/L    Alk.  phosphatase 102 45 - 117 U/L    Protein, total 6.9 6.4 - 8.2 g/dL    Albumin 3.3 (L) 3.5 - 5.0 g/dL    Globulin 3.6 2.0 - 4.0 g/dL    A-G Ratio 0.9 (L) 1.1 - 2.2     CK W/ REFLX CKMB    Collection Time: 11/06/19  4:46 PM   Result Value Ref Range     39 - 308 U/L   TROPONIN I    Collection Time: 11/06/19  4:46 PM   Result Value Ref Range    Troponin-I, Qt. <0.05 <0.05 ng/mL   EKG, 12 LEAD, SUBSEQUENT    Collection Time: 11/06/19  5:46 PM   Result Value Ref Range    Ventricular Rate 87 BPM    Atrial Rate 87 BPM    P-R Interval 190 ms    QRS Duration 86 ms    Q-T Interval 366 ms    QTC Calculation (Bezet) 440 ms    Calculated P Axis 45 degrees    Calculated R Axis 13 degrees    Calculated T Axis 58 degrees    Diagnosis       Normal sinus rhythm  Normal ECG  When compared with ECG of 06-NOV-2019 16:36,  MANUAL COMPARISON REQUIRED, DATA IS UNCONFIRMED     TROPONIN I    Collection Time: 11/06/19  8:00 PM   Result Value Ref Range    Troponin-I, Qt. <0.05 <0.05 ng/mL       Radiologic Studies -   XR CHEST PORT   Final Result   IMPRESSION:   No acute cardiopulmonary disease radiographically. .  . Xr Chest Port    Result Date: 11/6/2019  IMPRESSION: No acute cardiopulmonary disease radiographically. .  . Medical Decision Making   I am the first provider for this patient. I reviewed the vital signs, available nursing notes, past medical history, past surgical history, family history and social history. Vital Signs-Reviewed the patient's vital signs. Patient Vitals for the past 12 hrs:   Temp Pulse Resp BP SpO2   11/06/19 2100 98.7 °F (37.1 °C) 68 15 133/80 97 %   11/06/19 2000  72 24 131/79 97 %   11/06/19 1915  75 16 127/61 96 %   11/06/19 1845  81 16 108/67 96 %   11/06/19 1633 97.9 °F (36.6 °C) 86 18 (!) 177/113 99 %       Pulse Oximetry Analysis - 97% on ra    Cardiac Monitor:   Rate: 81 bpm  Rhythm: Normal Sinus Rhythm      ED EKG interpretation:  Rhythm: normal sinus rhythm; and regular . Rate (approx.): 87; Axis: normal; P wave: normal; QRS interval: normal ; ST/T wave: normal; Other findings: normal. This EKG was interpreted by CHENTE Caceres MD,ED Provider. Records Reviewed: Nursing Notes, Old Medical Records and Previous electrocardiograms    Provider Notes (Medical Decision Making):   Patient presents with chest pain shortness of breath consistent with prior episodes. Differential includes ACS, pneumonia, doubt PE, doubt pneumothorax, GERD, costochondritis, anxiety. Given patient's history, suspect most likely coronary vasospasm versus angina. Plan for labs, troponin x2, EKG, chest x-ray, nitro, aspirin. ED Course:   Initial assessment performed.  The patients presenting problems have been discussed, and they are in agreement with the care plan formulated and outlined with them. I have encouraged them to ask questions as they arise throughout their visit. Patient's pain felt improved after medications. Repeat troponin negative with unchanged repeat EKG. Mild increase in Cr, given IVF. Follow up for repeat labs advised. Will discharge instructions to follow-up with his cardiologist.  Return precautions discussed. Critical Care:  none    Disposition:  Discharge Note:  9:12 PM  The patient has been re-evaluated and is ready for discharge. Reviewed available results with patient. Counseled patient on diagnosis and care plan. Patient has expressed understanding, and all questions have been answered. Patient agrees with plan and agrees to follow up as recommended, or to return to the ED if their symptoms worsen. Discharge instructions have been provided and explained to the patient, along with reasons to return to the ED. PLAN:  1. Discharge Medication List as of 11/6/2019  9:12 PM        2. Follow-up Information     Follow up With Specialties Details Why Contact Info    your PCP   Schedule an appointment as soon as possible for a visit      your cardiologist  Schedule an appointment as soon as possible for a visit      [facility] EMERGENCY DEPT Emergency Medicine  As needed, If symptoms worsen [address] [place] [address]  [address] [facility]  [phone]        Return to ED if worse     Diagnosis     Clinical Impression:   1. Chest pain, unspecified type    2. MARGOT (acute kidney injury) ([facility] [address]        This note will not be viewable in [address] Please note that this dictation was completed with [employer], the computer voice recognition software. Quite often unanticipated grammatical, syntax, homophones, and other interpretive errors are inadvertently transcribed by the computer software. Please disregard these errors.   Please excuse any errors that have escaped final proofreading

## 2019-11-07 NOTE — ED NOTES
Removed IV  Pt discharged by [place]. Pt provided with discharge instructions Rx and instructions on follow up care. Pt ambulated out of ED without diffiulty accompanied by wife.

## 2019-11-07 NOTE — DISCHARGE INSTRUCTIONS

## 2020-01-23 ENCOUNTER — APPOINTMENT (OUTPATIENT)
Dept: GENERAL RADIOLOGY | Age: 55
End: 2020-01-23
Attending: PHYSICIAN ASSISTANT
Payer: MEDICAID

## 2020-01-23 ENCOUNTER — HOSPITAL ENCOUNTER (EMERGENCY)
Age: 55
Discharge: HOME | End: 2020-01-23
Attending: EMERGENCY MEDICINE
Payer: MEDICAID

## 2020-01-23 VITALS
BODY MASS INDEX: 36.4 KG/M2 | WEIGHT: 260 LBS | OXYGEN SATURATION: 95 % | SYSTOLIC BLOOD PRESSURE: 115 MMHG | HEIGHT: 71 IN | DIASTOLIC BLOOD PRESSURE: 76 MMHG | RESPIRATION RATE: 16 BRPM | HEART RATE: 75 BPM

## 2020-01-23 DIAGNOSIS — R07.9 CHRONIC CHEST PAIN: Primary | ICD-10-CM

## 2020-01-23 DIAGNOSIS — G89.29 CHRONIC CHEST PAIN: Primary | ICD-10-CM

## 2020-01-23 DIAGNOSIS — R07.89 ATYPICAL CHEST PAIN: ICD-10-CM

## 2020-01-23 LAB
ALBUMIN SERPL-MCNC: 3.1 G/DL (ref 3.5–5)
ALBUMIN/GLOB SERPL: 0.9 {RATIO} (ref 1.1–2.2)
ALP SERPL-CCNC: 105 U/L (ref 45–117)
ALT SERPL-CCNC: 35 U/L (ref 12–78)
ANION GAP SERPL CALC-SCNC: 6 MMOL/L (ref 5–15)
AST SERPL-CCNC: 30 U/L (ref 15–37)
BASOPHILS # BLD: 0.1 K/UL (ref 0–0.1)
BASOPHILS NFR BLD: 1 % (ref 0–1)
BILIRUB SERPL-MCNC: 0.5 MG/DL (ref 0.2–1)
BUN SERPL-MCNC: 13 MG/DL (ref 6–20)
BUN/CREAT SERPL: 11 (ref 12–20)
CALCIUM SERPL-MCNC: 8.6 MG/DL (ref 8.5–10.1)
CHLORIDE SERPL-SCNC: 106 MMOL/L (ref 97–108)
CK SERPL-CCNC: 79 U/L (ref 39–308)
CO2 SERPL-SCNC: 25 MMOL/L (ref 21–32)
CREAT SERPL-MCNC: 1.15 MG/DL (ref 0.7–1.3)
DIFFERENTIAL METHOD BLD: ABNORMAL
EOSINOPHIL # BLD: 0.2 K/UL (ref 0–0.4)
EOSINOPHIL NFR BLD: 2 % (ref 0–7)
ERYTHROCYTE [DISTWIDTH] IN BLOOD BY AUTOMATED COUNT: 13.9 % (ref 11.5–14.5)
GLOBULIN SER CALC-MCNC: 3.6 G/DL (ref 2–4)
GLUCOSE SERPL-MCNC: 147 MG/DL (ref 65–100)
HCT VFR BLD AUTO: 46.3 % (ref 36.6–50.3)
HGB BLD-MCNC: 16 G/DL (ref 12.1–17)
IMM GRANULOCYTES # BLD AUTO: 0.2 K/UL (ref 0–0.04)
IMM GRANULOCYTES NFR BLD AUTO: 2 % (ref 0–0.5)
LYMPHOCYTES # BLD: 2.8 K/UL (ref 0.8–3.5)
LYMPHOCYTES NFR BLD: 35 % (ref 12–49)
MCH RBC QN AUTO: 32.4 PG (ref 26–34)
MCHC RBC AUTO-ENTMCNC: 34.6 G/DL (ref 30–36.5)
MCV RBC AUTO: 93.7 FL (ref 80–99)
MONOCYTES # BLD: 0.9 K/UL (ref 0–1)
MONOCYTES NFR BLD: 11 % (ref 5–13)
NEUTS SEG # BLD: 3.9 K/UL (ref 1.8–8)
NEUTS SEG NFR BLD: 49 % (ref 32–75)
NRBC # BLD: 0 K/UL (ref 0–0.01)
NRBC BLD-RTO: 0 PER 100 WBC
PLATELET # BLD AUTO: 178 K/UL (ref 150–400)
PMV BLD AUTO: 10.1 FL (ref 8.9–12.9)
POTASSIUM SERPL-SCNC: 3.7 MMOL/L (ref 3.5–5.1)
PROT SERPL-MCNC: 6.7 G/DL (ref 6.4–8.2)
RBC # BLD AUTO: 4.94 M/UL (ref 4.1–5.7)
SODIUM SERPL-SCNC: 137 MMOL/L (ref 136–145)
TROPONIN I SERPL-MCNC: <0.05 NG/ML
TROPONIN I SERPL-MCNC: <0.05 NG/ML
WBC # BLD AUTO: 8 K/UL (ref 4.1–11.1)

## 2020-01-23 PROCEDURE — 80053 COMPREHEN METABOLIC PANEL: CPT

## 2020-01-23 PROCEDURE — 99284 EMERGENCY DEPT VISIT MOD MDM: CPT

## 2020-01-23 PROCEDURE — 74011250637 HC RX REV CODE- 250/637: Performed by: EMERGENCY MEDICINE

## 2020-01-23 PROCEDURE — 82550 ASSAY OF CK (CPK): CPT

## 2020-01-23 PROCEDURE — 85025 COMPLETE CBC W/AUTO DIFF WBC: CPT

## 2020-01-23 PROCEDURE — 93005 ELECTROCARDIOGRAM TRACING: CPT

## 2020-01-23 PROCEDURE — 74011250636 HC RX REV CODE- 250/636: Performed by: EMERGENCY MEDICINE

## 2020-01-23 PROCEDURE — 96375 TX/PRO/DX INJ NEW DRUG ADDON: CPT

## 2020-01-23 PROCEDURE — 96374 THER/PROPH/DIAG INJ IV PUSH: CPT

## 2020-01-23 PROCEDURE — 84484 ASSAY OF TROPONIN QUANT: CPT

## 2020-01-23 PROCEDURE — 71045 X-RAY EXAM CHEST 1 VIEW: CPT

## 2020-01-23 PROCEDURE — 36415 COLL VENOUS BLD VENIPUNCTURE: CPT

## 2020-01-23 PROCEDURE — 71046 X-RAY EXAM CHEST 2 VIEWS: CPT

## 2020-01-23 RX ORDER — KETOROLAC TROMETHAMINE 30 MG/ML
30 INJECTION, SOLUTION INTRAMUSCULAR; INTRAVENOUS ONCE
Status: COMPLETED | OUTPATIENT
Start: 2020-01-23 | End: 2020-01-23

## 2020-01-23 RX ORDER — ONDANSETRON 2 MG/ML
4 INJECTION INTRAMUSCULAR; INTRAVENOUS
Status: COMPLETED | OUTPATIENT
Start: 2020-01-23 | End: 2020-01-23

## 2020-01-23 RX ORDER — DIAZEPAM 5 MG/1
5 TABLET ORAL
Status: COMPLETED | OUTPATIENT
Start: 2020-01-23 | End: 2020-01-23

## 2020-01-23 RX ORDER — NITROGLYCERIN 0.4 MG/1
0.4 TABLET SUBLINGUAL
Status: DISCONTINUED | OUTPATIENT
Start: 2020-01-23 | End: 2020-01-23 | Stop reason: HOSPADM

## 2020-01-23 RX ADMIN — ONDANSETRON 4 MG: 2 INJECTION INTRAMUSCULAR; INTRAVENOUS at 15:42

## 2020-01-23 RX ADMIN — NITROGLYCERIN 0.4 MG: 0.4 TABLET, ORALLY DISINTEGRATING SUBLINGUAL at 15:34

## 2020-01-23 RX ADMIN — DIAZEPAM 5 MG: 5 TABLET ORAL at 15:34

## 2020-01-23 RX ADMIN — KETOROLAC TROMETHAMINE 30 MG: 30 INJECTION, SOLUTION INTRAMUSCULAR at 15:36

## 2020-01-23 NOTE — DISCHARGE INSTRUCTIONS
There are no signs of heart attack or other dangerous causes of chest pain today. You can follow-up with your primary care physician for your chronic chest pain symptoms. If you have worsening symptoms, fainting, difficulty breathing, or other concerns, consider returning to the emergency department.

## 2020-01-23 NOTE — ED PROVIDER NOTES
EMERGENCY DEPARTMENT HISTORY AND PHYSICAL EXAM      Date: 1/23/2020  Patient Name: Sarah Frye  Patient Age and Sex: 62 y.o. male    History of Presenting Illness     Chief Complaint   Patient presents with    Chest Pain     Pain at mid chest since 1230 today, reports nausea, SOB, numbness in left arm. History Provided By: Patient    Ability to gather history was limited by: None    HPI: Sarah Frye, 62 y.o. male with history of anxiety, chronic atypical chest pain, multiple normal heart catheterizations over the past year without evidence of coronary artery disease, complaining of recurrent similar left-sided chest pain, aching and pressure-like in nature, radiating down the left arm. Symptoms started yesterday. He endorses some mild nausea and shortness of breath, with numbness and tingling in the left arm. All the symptoms are similar to many prior episodes as far as I can tell. Pt denies any other alleviating or exacerbating factors. There are no other complaints, changes or physical findings at this time.      Past Medical History:   Diagnosis Date    Cholelithiasis 8/22/2011    High cholesterol     Hypertension     Other ill-defined conditions(799.89)     hypercholesterolemia    Psychiatric disorder     anxiety    Seasonal allergies     Status post laparoscopic cholecystectomy 8/25/11     Past Surgical History:   Procedure Laterality Date    HX CHOLECYSTECTOMY      HX ORTHOPAEDIC      back srgery, arm surgery    LAP,CHOLECYSTECTOMY  8/25/11       PCP: Susanne Pritchard MD    Past History     Past Medical History:  Past Medical History:   Diagnosis Date    Cholelithiasis 8/22/2011    High cholesterol     Hypertension     Other ill-defined conditions(799.89)     hypercholesterolemia    Psychiatric disorder     anxiety    Seasonal allergies     Status post laparoscopic cholecystectomy 8/25/11       Past Surgical History:  Past Surgical History:   Procedure Laterality Date    HX CHOLECYSTECTOMY      HX ORTHOPAEDIC      back srgery, arm surgery    LAP,CHOLECYSTECTOMY  8/25/11       Family History:  Family History   Problem Relation Age of Onset    Heart Disease Mother     Headache Mother     Heart Disease Maternal Grandfather     Heart Disease Paternal Grandmother     Headache Sister     Headache Brother        Social History:  Social History     Tobacco Use    Smoking status: Former Smoker    Smokeless tobacco: Former User     Quit date: 1/1/2001   Substance Use Topics    Alcohol use: Yes     Alcohol/week: 28.0 standard drinks     Types: 28 Cans of beer per week    Drug use: No       Allergies: Allergies   Allergen Reactions    Codeine Nausea and Vomiting    Dilaudid [Hydromorphone (Bulk)] Nausea and Vomiting     Caused N&V when given in the hospital    Morphine Rash       Current Medications:  No current facility-administered medications on file prior to encounter. Current Outpatient Medications on File Prior to Encounter   Medication Sig Dispense Refill    ondansetron (ZOFRAN ODT) 4 mg disintegrating tablet Take 1 Tab by mouth every eight (8) hours as needed for Nausea. 10 Tab 0    naproxen (NAPROSYN) 500 mg tablet Take 1 Tab by mouth every twelve (12) hours as needed for Pain. 20 Tab 0    gabapentin (NEURONTIN) 300 mg capsule Take 300 mg by mouth three (3) times daily.  eszopiclone (LUNESTA) 3 mg tablet Take 3 mg by mouth nightly.  acetaminophen (TYLENOL) 500 mg tablet Take 500 mg by mouth nightly as needed for Pain. Indications: ARTHRITIC PAIN      lisinopril-hydrochlorothiazide (PRINZIDE, ZESTORETIC) 20-12.5 mg per tablet Take 1 Tab by mouth daily. 1 tablet daily for 30 days per prescription      ARIPiprazole (ABILIFY) 20 mg tablet Take 20 mg by mouth daily.  meclizine (ANTIVERT) 25 mg tablet Take  by mouth three (3) times daily as needed.  escitalopram (LEXAPRO) 20 mg tablet Take 40 mg by mouth daily.       rosuvastatin (CRESTOR) 20 mg tablet Take 20 mg by mouth nightly.  omeprazole (PRILOSEC) 20 mg capsule Take 20 mg by mouth daily.  MULTI-VITAMIN PO Take  by mouth.  omega-3 fatty acids-vitamin e (FISH OIL) 1,000 mg Cap Take 4 Caps by mouth daily. Review of Systems   Review of Systems   Constitutional: Negative for fever. Respiratory: Positive for shortness of breath. Cardiovascular: Positive for chest pain. All other systems reviewed and are negative. Physical Exam   Vital Signs  Patient Vitals for the past 24 hrs:   Pulse Resp BP SpO2   01/23/20 1800 75 16 115/76 95 %   01/23/20 1700 88 14 129/84 94 %   01/23/20 1630 93 15 102/61 91 %   01/23/20 1600 95 19 109/59 93 %   01/23/20 1530 78 17 120/73 94 %   01/23/20 1510 80 14 132/75 97 %   01/23/20 1430 89 21 113/88 95 %       Physical Exam  Vitals signs and nursing note reviewed. Constitutional:       General: He is not in acute distress. Appearance: He is well-developed. HENT:      Head: Normocephalic and atraumatic. Eyes:      General:         Right eye: No discharge. Left eye: No discharge. Conjunctiva/sclera: Conjunctivae normal.   Neck:      Musculoskeletal: Normal range of motion and neck supple. Cardiovascular:      Rate and Rhythm: Normal rate and regular rhythm. Heart sounds: Normal heart sounds. No murmur. Pulmonary:      Effort: Pulmonary effort is normal. No respiratory distress. Breath sounds: Normal breath sounds. No wheezing. Abdominal:      General: There is no distension. Palpations: Abdomen is soft. Tenderness: There is no tenderness. Musculoskeletal: Normal range of motion. General: No deformity. Skin:     General: Skin is warm and dry. Findings: No rash. Neurological:      Mental Status: He is alert and oriented to person, place, and time. Psychiatric:         Behavior: Behavior normal.         Thought Content:  Thought content normal.         Diagnostic Study Results Labs  Recent Results (from the past 24 hour(s))   CBC WITH AUTOMATED DIFF    Collection Time: 01/23/20  2:25 PM   Result Value Ref Range    WBC 8.0 4.1 - 11.1 K/uL    RBC 4.94 4.10 - 5.70 M/uL    HGB 16.0 12.1 - 17.0 g/dL    HCT 46.3 36.6 - 50.3 %    MCV 93.7 80.0 - 99.0 FL    MCH 32.4 26.0 - 34.0 PG    MCHC 34.6 30.0 - 36.5 g/dL    RDW 13.9 11.5 - 14.5 %    PLATELET 163 645 - 562 K/uL    MPV 10.1 8.9 - 12.9 FL    NRBC 0.0 0  WBC    ABSOLUTE NRBC 0.00 0.00 - 0.01 K/uL    NEUTROPHILS 49 32 - 75 %    LYMPHOCYTES 35 12 - 49 %    MONOCYTES 11 5 - 13 %    EOSINOPHILS 2 0 - 7 %    BASOPHILS 1 0 - 1 %    IMMATURE GRANULOCYTES 2 (H) 0.0 - 0.5 %    ABS. NEUTROPHILS 3.9 1.8 - 8.0 K/UL    ABS. LYMPHOCYTES 2.8 0.8 - 3.5 K/UL    ABS. MONOCYTES 0.9 0.0 - 1.0 K/UL    ABS. EOSINOPHILS 0.2 0.0 - 0.4 K/UL    ABS. BASOPHILS 0.1 0.0 - 0.1 K/UL    ABS. IMM. GRANS. 0.2 (H) 0.00 - 0.04 K/UL    DF AUTOMATED     METABOLIC PANEL, COMPREHENSIVE    Collection Time: 01/23/20  2:25 PM   Result Value Ref Range    Sodium 137 136 - 145 mmol/L    Potassium 3.7 3.5 - 5.1 mmol/L    Chloride 106 97 - 108 mmol/L    CO2 25 21 - 32 mmol/L    Anion gap 6 5 - 15 mmol/L    Glucose 147 (H) 65 - 100 mg/dL    BUN 13 6 - 20 MG/DL    Creatinine 1.15 0.70 - 1.30 MG/DL    BUN/Creatinine ratio 11 (L) 12 - 20      GFR est AA >60 >60 ml/min/1.73m2    GFR est non-AA >60 >60 ml/min/1.73m2    Calcium 8.6 8.5 - 10.1 MG/DL    Bilirubin, total 0.5 0.2 - 1.0 MG/DL    ALT (SGPT) 35 12 - 78 U/L    AST (SGOT) 30 15 - 37 U/L    Alk.  phosphatase 105 45 - 117 U/L    Protein, total 6.7 6.4 - 8.2 g/dL    Albumin 3.1 (L) 3.5 - 5.0 g/dL    Globulin 3.6 2.0 - 4.0 g/dL    A-G Ratio 0.9 (L) 1.1 - 2.2     TROPONIN I    Collection Time: 01/23/20  2:25 PM   Result Value Ref Range    Troponin-I, Qt. <0.05 <0.05 ng/mL   CK W/ REFLX CKMB    Collection Time: 01/23/20  2:25 PM   Result Value Ref Range    CK 79 39 - 308 U/L   TROPONIN I    Collection Time: 01/23/20  5:26 PM   Result Value Ref Range    Troponin-I, Qt. <0.05 <0.05 ng/mL       Radiologic Studies  XR CHEST PA LAT   Final Result   IMPRESSION:   No acute findings. CT Results  (Last 48 hours)    None        CXR Results  (Last 48 hours)               01/23/20 1437  XR CHEST PA LAT Final result    Impression:  IMPRESSION:   No acute findings. Narrative:  History: Chest pain. Frontal and lateral views of the chest show clear lungs. The lungs are   hyperinflated. The heart, mediastinum and pulmonary vasculature are normal.    There are mild degenerative changes of the spine. Procedures   EKG  Date/Time: 1/23/2020 3:30 PM  Performed by: Helder Lawrence MD  Authorized by: Helder Lawrence MD     ECG reviewed by ED Physician in the absence of a cardiologist: yes    Interpretation:     Interpretation: non-specific    Rate:     ECG rate assessment: normal    Rhythm:     Rhythm: sinus rhythm    Ectopy:     Ectopy: none    QRS:     QRS axis:  Normal  ST segments:     ST segments:  Normal  T waves:     T waves: normal          Medical Decision Making     Provider Notes (Medical Decision Making):   78-year-old male with recurrent longstanding history of chest pain, atypical, without evidence of coronary artery disease. He had a clean catheterizations in 2016, and he describes another one in what sounds like 2018. He has never required any stents. As far as I can tell he has clean coronaries. I suspect a significant component of his presentation is secondary to anxiety. His EKG is nonischemic. Troponin is undetectable. Normal laboratories, vital signs and EKG and chest x-ray. There is no significant clinical concern at this time for ACS, myocardial infarction, unstable angina, pneumothorax, or pulmonary embolism. Chronic atypical chest pain. Stable for outpatient management and follow-up with his cardiologist.    Mariano Moran MD  3:28 PM          Consult required? No      Medications Administered During ED Course:  Medications   ketorolac (TORADOL) injection 30 mg (30 mg IntraVENous Given 1/23/20 1536)   diazePAM (VALIUM) tablet 5 mg (5 mg Oral Given 1/23/20 1534)   ondansetron (ZOFRAN) injection 4 mg (4 mg IntraVENous Given 1/23/20 1542)          Diagnosis and Disposition     Disposition:  Discharged    Clinical Impression:   1. Chronic chest pain    2. Atypical chest pain        Attestation:  I personally performed the services described in this documentation on this date 1/23/2020 for patient Jennifer Ramey. Shani Amanda MD        I was the first provider for this patient on this visit. To the best of my ability I reviewed relevant prior medical records, electrocardiograms, laboratories, and radiologic studies. The patient's presenting problems were discussed, and the patient was in agreement with the care plan formulated and outlined with them. Shani Amanda MD    Please note that this dictation was completed with Dragon voice recognition software. Quite often unanticipated grammatical, syntax, homophones, and other interpretive errors are inadvertently transcribed by the computer software. Please disregard these errors and excuse any errors that have escaped final proofreading.

## 2020-01-24 LAB
ATRIAL RATE: 94 BPM
CALCULATED P AXIS, ECG09: 28 DEGREES
CALCULATED R AXIS, ECG10: 13 DEGREES
CALCULATED T AXIS, ECG11: 28 DEGREES
DIAGNOSIS, 93000: NORMAL
P-R INTERVAL, ECG05: 182 MS
Q-T INTERVAL, ECG07: 352 MS
QRS DURATION, ECG06: 78 MS
QTC CALCULATION (BEZET), ECG08: 440 MS
VENTRICULAR RATE, ECG03: 94 BPM

## 2021-05-15 ENCOUNTER — APPOINTMENT (OUTPATIENT)
Dept: CT IMAGING | Age: 56
End: 2021-05-15
Attending: EMERGENCY MEDICINE
Payer: MEDICAID

## 2021-05-15 ENCOUNTER — HOSPITAL ENCOUNTER (EMERGENCY)
Age: 56
Discharge: HOME | End: 2021-05-15
Attending: EMERGENCY MEDICINE | Admitting: EMERGENCY MEDICINE
Payer: MEDICAID

## 2021-05-15 VITALS
RESPIRATION RATE: 16 BRPM | HEIGHT: 71 IN | HEART RATE: 82 BPM | OXYGEN SATURATION: 92 % | SYSTOLIC BLOOD PRESSURE: 103 MMHG | DIASTOLIC BLOOD PRESSURE: 83 MMHG | BODY MASS INDEX: 33.32 KG/M2 | WEIGHT: 238 LBS | TEMPERATURE: 97.5 F

## 2021-05-15 DIAGNOSIS — R10.31 ABDOMINAL PAIN, RIGHT LOWER QUADRANT: Primary | ICD-10-CM

## 2021-05-15 LAB
ABO + RH BLD: NORMAL
ALBUMIN SERPL-MCNC: 3.1 G/DL (ref 3.5–5)
ALBUMIN/GLOB SERPL: 1 {RATIO} (ref 1.1–2.2)
ALP SERPL-CCNC: 104 U/L (ref 45–117)
ALT SERPL-CCNC: 31 U/L (ref 12–78)
ANION GAP SERPL CALC-SCNC: 4 MMOL/L (ref 5–15)
AST SERPL-CCNC: 15 U/L (ref 15–37)
BASOPHILS # BLD: 0.1 K/UL (ref 0–0.1)
BASOPHILS NFR BLD: 1 % (ref 0–1)
BILIRUB SERPL-MCNC: 0.2 MG/DL (ref 0.2–1)
BLOOD GROUP ANTIBODIES SERPL: NORMAL
BUN SERPL-MCNC: 5 MG/DL (ref 6–20)
BUN/CREAT SERPL: 6 (ref 12–20)
CALCIUM SERPL-MCNC: 8.3 MG/DL (ref 8.5–10.1)
CHLORIDE SERPL-SCNC: 109 MMOL/L (ref 97–108)
CO2 SERPL-SCNC: 27 MMOL/L (ref 21–32)
COMMENT, HOLDF: NORMAL
CREAT SERPL-MCNC: 0.9 MG/DL (ref 0.7–1.3)
DIFFERENTIAL METHOD BLD: ABNORMAL
EOSINOPHIL # BLD: 0.3 K/UL (ref 0–0.4)
EOSINOPHIL NFR BLD: 6 % (ref 0–7)
ERYTHROCYTE [DISTWIDTH] IN BLOOD BY AUTOMATED COUNT: 13 % (ref 11.5–14.5)
GLOBULIN SER CALC-MCNC: 3.2 G/DL (ref 2–4)
GLUCOSE SERPL-MCNC: 92 MG/DL (ref 65–100)
HCT VFR BLD AUTO: 42.8 % (ref 36.6–50.3)
HEMOCCULT STL QL: NEGATIVE
HGB BLD-MCNC: 14.3 G/DL (ref 12.1–17)
IMM GRANULOCYTES # BLD AUTO: 0.1 K/UL (ref 0–0.04)
IMM GRANULOCYTES NFR BLD AUTO: 1 % (ref 0–0.5)
INR PPP: 1 (ref 0.9–1.1)
LACTATE SERPL-SCNC: 0.8 MMOL/L (ref 0.4–2)
LYMPHOCYTES # BLD: 2 K/UL (ref 0.8–3.5)
LYMPHOCYTES NFR BLD: 34 % (ref 12–49)
MCH RBC QN AUTO: 31.3 PG (ref 26–34)
MCHC RBC AUTO-ENTMCNC: 33.4 G/DL (ref 30–36.5)
MCV RBC AUTO: 93.7 FL (ref 80–99)
MONOCYTES # BLD: 0.6 K/UL (ref 0–1)
MONOCYTES NFR BLD: 9 % (ref 5–13)
NEUTS SEG # BLD: 3 K/UL (ref 1.8–8)
NEUTS SEG NFR BLD: 49 % (ref 32–75)
NRBC # BLD: 0 K/UL (ref 0–0.01)
NRBC BLD-RTO: 0 PER 100 WBC
PLATELET # BLD AUTO: 149 K/UL (ref 150–400)
PMV BLD AUTO: 10 FL (ref 8.9–12.9)
POTASSIUM SERPL-SCNC: 3.6 MMOL/L (ref 3.5–5.1)
PROT SERPL-MCNC: 6.3 G/DL (ref 6.4–8.2)
PROTHROMBIN TIME: 10.7 SEC (ref 9–11.1)
RBC # BLD AUTO: 4.57 M/UL (ref 4.1–5.7)
SAMPLES BEING HELD,HOLD: NORMAL
SODIUM SERPL-SCNC: 140 MMOL/L (ref 136–145)
SPECIMEN EXP DATE BLD: NORMAL
UR CULT HOLD, URHOLD: NORMAL
WBC # BLD AUTO: 6 K/UL (ref 4.1–11.1)

## 2021-05-15 PROCEDURE — 82272 OCCULT BLD FECES 1-3 TESTS: CPT

## 2021-05-15 PROCEDURE — 85025 COMPLETE CBC W/AUTO DIFF WBC: CPT

## 2021-05-15 PROCEDURE — 80053 COMPREHEN METABOLIC PANEL: CPT

## 2021-05-15 PROCEDURE — 74178 CT ABD&PLV WO CNTR FLWD CNTR: CPT

## 2021-05-15 PROCEDURE — 74011250636 HC RX REV CODE- 250/636: Performed by: EMERGENCY MEDICINE

## 2021-05-15 PROCEDURE — 85610 PROTHROMBIN TIME: CPT

## 2021-05-15 PROCEDURE — 83605 ASSAY OF LACTIC ACID: CPT

## 2021-05-15 PROCEDURE — 86901 BLOOD TYPING SEROLOGIC RH(D): CPT

## 2021-05-15 PROCEDURE — 74011000636 HC RX REV CODE- 636: Performed by: EMERGENCY MEDICINE

## 2021-05-15 PROCEDURE — 99283 EMERGENCY DEPT VISIT LOW MDM: CPT

## 2021-05-15 PROCEDURE — 36415 COLL VENOUS BLD VENIPUNCTURE: CPT

## 2021-05-15 RX ADMIN — IOPAMIDOL 100 ML: 755 INJECTION, SOLUTION INTRAVENOUS at 05:42

## 2021-05-15 RX ADMIN — SODIUM CHLORIDE 1000 ML: 9 INJECTION, SOLUTION INTRAVENOUS at 05:46

## 2021-05-15 NOTE — ED PROVIDER NOTES
56-year-old male with past medical history significant for hypertension, high cholesterol, cholelithiasis status post cholecystectomy presents with complaints of intermittent bloody stool x2 weeks. Patient reports he has been seen for the same thing in the past but does not have a diagnosis. Patient reports initially he thought bleeding was hemorrhoids but has not appreciated any hemorrhoids when he investigates and has no anal itching. Reports he has had a colonoscopy several years ago in which a polyp was removed. Denies any history of diverticulosis at that time. Patient also complains of right lower quadrant abdominal pain. Denies fever, chills, nausea, vomiting. In reference to triage note patient reports he occasionally spits pink frothy substance with coughing.      Former smoker  Denies drug use           Past Medical History:   Diagnosis Date    Cholelithiasis 8/22/2011    High cholesterol     Hypertension     Other ill-defined conditions(799.89)     hypercholesterolemia    Psychiatric disorder     anxiety    Seasonal allergies     Status post laparoscopic cholecystectomy 8/25/11       Past Surgical History:   Procedure Laterality Date    HX CHOLECYSTECTOMY      HX ORTHOPAEDIC      back srgery, arm surgery    LAP,CHOLECYSTECTOMY  8/25/11         Family History:   Problem Relation Age of Onset    Heart Disease Mother     Headache Mother     Heart Disease Maternal Grandfather     Heart Disease Paternal Grandmother     Headache Sister     Headache Brother        Social History     Socioeconomic History    Marital status:      Spouse name: Not on file    Number of children: Not on file    Years of education: Not on file    Highest education level: Not on file   Occupational History    Not on file   Social Needs    Financial resource strain: Not on file    Food insecurity     Worry: Not on file     Inability: Not on file    Transportation needs     Medical: Not on file Non-medical: Not on file   Tobacco Use    Smoking status: Former Smoker    Smokeless tobacco: Former User     Quit date: 1/1/2001   Substance and Sexual Activity    Alcohol use: Yes     Alcohol/week: 28.0 standard drinks     Types: 28 Cans of beer per week    Drug use: No    Sexual activity: Not on file   Lifestyle    Physical activity     Days per week: Not on file     Minutes per session: Not on file    Stress: Not on file   Relationships    Social connections     Talks on phone: Not on file     Gets together: Not on file     Attends [employer] service: Not on file     Active member of club or organization: Not on file     Attends meetings of clubs or organizations: Not on file     Relationship status: Not on file    Intimate partner violence     Fear of current or ex partner: Not on file     Emotionally abused: Not on file     Physically abused: Not on file     Forced sexual activity: Not on file   Other Topics Concern    Not on file   Social History Narrative    ** Merged History Encounter **              ALLERGIES: Codeine, Dilaudid [hydromorphone (bulk)], and Morphine    Review of Systems   Constitutional: Negative for chills and fever. HENT: Negative for congestion, nosebleeds and sore throat. Eyes: Negative for pain and discharge. Respiratory: Negative for cough and shortness of breath. Cardiovascular: Negative for chest pain and palpitations. Gastrointestinal: Positive for abdominal pain and blood in stool. Negative for constipation, nausea and vomiting. Genitourinary: Negative for decreased urine volume, dysuria, flank pain and urgency. Musculoskeletal: Negative for gait problem and myalgias. Skin: Negative for rash and wound. Neurological: Negative for seizures and syncope. Hematological: Does not bruise/bleed easily. Psychiatric/Behavioral: Negative for confusion, self-injury and suicidal ideas.        Vitals:    05/15/21 9980 05/15/21 0500 05/15/21 0545 05/15/21 5744 BP: 114/72 110/75 110/79    Pulse: 63      Resp: 16      Temp: 97.5 °F (36.4 °C)      SpO2: 99% 96%  96%   Weight: 108 kg (238 lb)      Height: 5' 11\" (1.803 m)               Physical Exam  Vitals and nursing note reviewed. Constitutional:       Appearance: He is well-developed. HENT:      Head: Normocephalic and atraumatic. Eyes:      Pupils: Pupils are equal, round, and reactive to light. Cardiovascular:      Rate and Rhythm: Normal rate and regular rhythm. Heart sounds: Normal heart sounds. Pulmonary:      Effort: Pulmonary effort is normal. No respiratory distress. Breath sounds: Normal breath sounds. No wheezing. Abdominal:      General: Bowel sounds are normal.      Palpations: Abdomen is soft. Tenderness: There is abdominal tenderness in the right lower quadrant. There is no guarding or rebound. Musculoskeletal:         General: Normal range of motion. Cervical back: Normal range of motion and neck supple. Skin:     General: Skin is warm and dry. Neurological:      Mental Status: He is alert and oriented to person, place, and time. Psychiatric:         Behavior: Behavior normal.          MDM  Number of Diagnoses or Management Options  Abdominal pain, right lower quadrant  Diagnosis management comments: 22-year-old male presents with right lower abdominal pain and bloody stools x1 week. Patient is well-appearing, no acute distress, hemodynamically stable, abdomen with mild right lower tenderness to palpation. Brown stool on exam.  PlanHemoccult stool, CBC/CMP/lactate, CT abdomen pelvis. Declines pain medication.     Hemoccult negative  Labs and CT unremarkable       Amount and/or Complexity of Data Reviewed  Clinical lab tests: ordered and reviewed  Tests in the radiology section of CPT®: ordered and reviewed  Independent visualization of images, tracings, or specimens: yes    Patient Progress  Patient progress: improved         Procedures      6:37 AM  Patient's results have been reviewed with them. Patient and/or family have verbally conveyed their understanding and agreement of the patient's signs, symptoms, diagnosis, treatment and prognosis and additionally agree to follow up as recommended or return to the Emergency Room should their condition change prior to follow-up. Discharge instructions have also been provided to the patient with some educational information regarding their diagnosis as well a list of reasons why they would want to return to the ER prior to their follow-up appointment should their condition change.

## 2021-05-15 NOTE — ED TRIAGE NOTES
Patient ambulatory to ED with CC of blood in stool. Patient stated that he has been seen by two other physicians for the same problem but it keeps happening.      Per the patient he is also throwing up a \"pink frothy\" substance

## 2021-07-10 ENCOUNTER — HOSPITAL ENCOUNTER (EMERGENCY)
Age: 56
Discharge: HOME | End: 2021-07-10
Attending: EMERGENCY MEDICINE | Admitting: EMERGENCY MEDICINE
Payer: MEDICARE

## 2021-07-10 ENCOUNTER — HOSPITAL ENCOUNTER (EMERGENCY)
Dept: GENERAL RADIOLOGY | Age: 56
Discharge: HOME | End: 2021-07-10
Attending: EMERGENCY MEDICINE
Payer: MEDICARE

## 2021-07-10 VITALS
TEMPERATURE: 98 F | DIASTOLIC BLOOD PRESSURE: 80 MMHG | RESPIRATION RATE: 16 BRPM | BODY MASS INDEX: 33.6 KG/M2 | WEIGHT: 240 LBS | SYSTOLIC BLOOD PRESSURE: 127 MMHG | HEIGHT: 71 IN | OXYGEN SATURATION: 96 % | HEART RATE: 71 BPM

## 2021-07-10 DIAGNOSIS — S39.012A BACK STRAIN, INITIAL ENCOUNTER: ICD-10-CM

## 2021-07-10 DIAGNOSIS — R07.9 CHEST PAIN, UNSPECIFIED TYPE: Primary | ICD-10-CM

## 2021-07-10 DIAGNOSIS — S16.1XXA NECK STRAIN, INITIAL ENCOUNTER: ICD-10-CM

## 2021-07-10 LAB
ALBUMIN SERPL-MCNC: 3.4 G/DL (ref 3.5–5)
ALBUMIN/GLOB SERPL: 1.1 {RATIO} (ref 1.1–2.2)
ALP SERPL-CCNC: 105 U/L (ref 45–117)
ALT SERPL-CCNC: 26 U/L (ref 12–78)
ANION GAP SERPL CALC-SCNC: 6 MMOL/L (ref 5–15)
AST SERPL-CCNC: 19 U/L (ref 15–37)
BASOPHILS # BLD: 0.1 K/UL (ref 0–0.1)
BASOPHILS NFR BLD: 1 % (ref 0–1)
BILIRUB SERPL-MCNC: 0.6 MG/DL (ref 0.2–1)
BUN SERPL-MCNC: 8 MG/DL (ref 6–20)
BUN/CREAT SERPL: 7 (ref 12–20)
CALCIUM SERPL-MCNC: 8.9 MG/DL (ref 8.5–10.1)
CHLORIDE SERPL-SCNC: 107 MMOL/L (ref 97–108)
CO2 SERPL-SCNC: 26 MMOL/L (ref 21–32)
COMMENT, HOLDF: NORMAL
CREAT SERPL-MCNC: 1.08 MG/DL (ref 0.7–1.3)
DIFFERENTIAL METHOD BLD: ABNORMAL
EOSINOPHIL # BLD: 0.3 K/UL (ref 0–0.4)
EOSINOPHIL NFR BLD: 3 % (ref 0–7)
ERYTHROCYTE [DISTWIDTH] IN BLOOD BY AUTOMATED COUNT: 13 % (ref 11.5–14.5)
GLOBULIN SER CALC-MCNC: 3.2 G/DL (ref 2–4)
GLUCOSE SERPL-MCNC: 83 MG/DL (ref 65–100)
HCT VFR BLD AUTO: 42.5 % (ref 36.6–50.3)
HGB BLD-MCNC: 14.7 G/DL (ref 12.1–17)
IMM GRANULOCYTES # BLD AUTO: 0.1 K/UL (ref 0–0.04)
IMM GRANULOCYTES NFR BLD AUTO: 1 % (ref 0–0.5)
LYMPHOCYTES # BLD: 3.3 K/UL (ref 0.8–3.5)
LYMPHOCYTES NFR BLD: 32 % (ref 12–49)
MCH RBC QN AUTO: 31.1 PG (ref 26–34)
MCHC RBC AUTO-ENTMCNC: 34.6 G/DL (ref 30–36.5)
MCV RBC AUTO: 90 FL (ref 80–99)
MONOCYTES # BLD: 1.1 K/UL (ref 0–1)
MONOCYTES NFR BLD: 10 % (ref 5–13)
NEUTS SEG # BLD: 5.6 K/UL (ref 1.8–8)
NEUTS SEG NFR BLD: 53 % (ref 32–75)
NRBC # BLD: 0 K/UL (ref 0–0.01)
NRBC BLD-RTO: 0 PER 100 WBC
PLATELET # BLD AUTO: 138 K/UL (ref 150–400)
PMV BLD AUTO: 9.9 FL (ref 8.9–12.9)
POTASSIUM SERPL-SCNC: 4 MMOL/L (ref 3.5–5.1)
PROT SERPL-MCNC: 6.6 G/DL (ref 6.4–8.2)
RBC # BLD AUTO: 4.72 M/UL (ref 4.1–5.7)
SAMPLES BEING HELD,HOLD: NORMAL
SODIUM SERPL-SCNC: 139 MMOL/L (ref 136–145)
TROPONIN I SERPL-MCNC: <0.05 NG/ML
TROPONIN I SERPL-MCNC: <0.05 NG/ML
WBC # BLD AUTO: 10.5 K/UL (ref 4.1–11.1)

## 2021-07-10 PROCEDURE — 93005 ELECTROCARDIOGRAM TRACING: CPT

## 2021-07-10 PROCEDURE — 72072 X-RAY EXAM THORAC SPINE 3VWS: CPT

## 2021-07-10 PROCEDURE — 72050 X-RAY EXAM NECK SPINE 4/5VWS: CPT

## 2021-07-10 PROCEDURE — 84484 ASSAY OF TROPONIN QUANT: CPT

## 2021-07-10 PROCEDURE — 80053 COMPREHEN METABOLIC PANEL: CPT

## 2021-07-10 PROCEDURE — 71046 X-RAY EXAM CHEST 2 VIEWS: CPT

## 2021-07-10 PROCEDURE — 85025 COMPLETE CBC W/AUTO DIFF WBC: CPT

## 2021-07-10 PROCEDURE — 36415 COLL VENOUS BLD VENIPUNCTURE: CPT

## 2021-07-10 PROCEDURE — 99285 EMERGENCY DEPT VISIT HI MDM: CPT
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
## 2021-07-11 NOTE — ED NOTES
Patient on the monitor. Call light within reach. Denies any needs at this time. Aware that we are awaiting imaging results. Will continue to monitor.

## 2021-07-13 LAB
ATRIAL RATE: 83 BPM
CALCULATED P AXIS, ECG09: 54 DEGREES
CALCULATED R AXIS, ECG10: 30 DEGREES
CALCULATED T AXIS, ECG11: 46 DEGREES
DIAGNOSIS, 93000: NORMAL
P-R INTERVAL, ECG05: 220 MS
Q-T INTERVAL, ECG07: 386 MS
QRS DURATION, ECG06: 84 MS
QTC CALCULATION (BEZET), ECG08: 453 MS
VENTRICULAR RATE, ECG03: 83 BPM

## 2021-11-03 NOTE — PERIOP NOTES
Patient denied any COVID-19 signs, symptoms or possible exposure in the last 30 days. Patient is fully vaccinated & will bring proof of vaccination(s) day of surgery/procedure.

## 2021-11-12 ENCOUNTER — HOSPITAL ENCOUNTER (OUTPATIENT)
Age: 56
Discharge: HOME | End: 2021-11-12
Attending: INTERNAL MEDICINE | Admitting: INTERNAL MEDICINE
Payer: MEDICARE

## 2021-11-12 VITALS
HEART RATE: 91 BPM | OXYGEN SATURATION: 94 % | DIASTOLIC BLOOD PRESSURE: 68 MMHG | WEIGHT: 260 LBS | RESPIRATION RATE: 19 BRPM | BODY MASS INDEX: 35.21 KG/M2 | SYSTOLIC BLOOD PRESSURE: 111 MMHG | HEIGHT: 72 IN | TEMPERATURE: 98.1 F

## 2021-11-12 DIAGNOSIS — R07.9 CHEST PAIN, UNSPECIFIED TYPE: ICD-10-CM

## 2021-11-12 PROCEDURE — C1894 INTRO/SHEATH, NON-LASER: HCPCS | Performed by: INTERNAL MEDICINE

## 2021-11-12 PROCEDURE — 77030015766: Performed by: INTERNAL MEDICINE

## 2021-11-12 PROCEDURE — 74011250636 HC RX REV CODE- 250/636: Performed by: INTERNAL MEDICINE

## 2021-11-12 PROCEDURE — 77030010221 HC SPLNT WR POS TELE -B: Performed by: INTERNAL MEDICINE

## 2021-11-12 PROCEDURE — 77030008543 HC TBNG MON PRSS MRTM -A: Performed by: INTERNAL MEDICINE

## 2021-11-12 PROCEDURE — 74011000250 HC RX REV CODE- 250: Performed by: INTERNAL MEDICINE

## 2021-11-12 PROCEDURE — 2709999900 HC NON-CHARGEABLE SUPPLY: Performed by: INTERNAL MEDICINE

## 2021-11-12 PROCEDURE — 77030042317 HC BND COMPR HEMSTAT -B: Performed by: INTERNAL MEDICINE

## 2021-11-12 PROCEDURE — 99152 MOD SED SAME PHYS/QHP 5/>YRS: CPT | Performed by: INTERNAL MEDICINE

## 2021-11-12 PROCEDURE — 77030019698 HC SYR ANGI MDLON MRTM -A: Performed by: INTERNAL MEDICINE

## 2021-11-12 PROCEDURE — 74011000636 HC RX REV CODE- 636: Performed by: INTERNAL MEDICINE

## 2021-11-12 PROCEDURE — 93458 L HRT ARTERY/VENTRICLE ANGIO: CPT | Performed by: INTERNAL MEDICINE

## 2021-11-12 PROCEDURE — C1769 GUIDE WIRE: HCPCS | Performed by: INTERNAL MEDICINE

## 2021-11-12 PROCEDURE — 74011250637 HC RX REV CODE- 250/637: Performed by: INTERNAL MEDICINE

## 2021-11-12 RX ORDER — FENTANYL CITRATE 50 UG/ML
INJECTION, SOLUTION INTRAMUSCULAR; INTRAVENOUS AS NEEDED
Status: DISCONTINUED | OUTPATIENT
Start: 2021-11-12 | End: 2021-11-12 | Stop reason: HOSPADM

## 2021-11-12 RX ORDER — GUAIFENESIN 100 MG/5ML
LIQUID (ML) ORAL AS NEEDED
Status: DISCONTINUED | OUTPATIENT
Start: 2021-11-12 | End: 2021-11-12 | Stop reason: HOSPADM

## 2021-11-12 RX ORDER — HEPARIN SODIUM 200 [USP'U]/100ML
INJECTION, SOLUTION INTRAVENOUS
Status: COMPLETED | OUTPATIENT
Start: 2021-11-12 | End: 2021-11-12

## 2021-11-12 RX ORDER — SODIUM CHLORIDE 0.9 % (FLUSH) 0.9 %
5-40 SYRINGE (ML) INJECTION EVERY 8 HOURS
Status: DISCONTINUED | OUTPATIENT
Start: 2021-11-12 | End: 2021-11-12 | Stop reason: HOSPADM

## 2021-11-12 RX ORDER — MIDAZOLAM HYDROCHLORIDE 1 MG/ML
INJECTION, SOLUTION INTRAMUSCULAR; INTRAVENOUS AS NEEDED
Status: DISCONTINUED | OUTPATIENT
Start: 2021-11-12 | End: 2021-11-12 | Stop reason: HOSPADM

## 2021-11-12 RX ORDER — LIDOCAINE HYDROCHLORIDE 10 MG/ML
INJECTION, SOLUTION EPIDURAL; INFILTRATION; INTRACAUDAL; PERINEURAL AS NEEDED
Status: DISCONTINUED | OUTPATIENT
Start: 2021-11-12 | End: 2021-11-12 | Stop reason: HOSPADM

## 2021-11-12 RX ORDER — HEPARIN SODIUM 1000 [USP'U]/ML
INJECTION, SOLUTION INTRAVENOUS; SUBCUTANEOUS AS NEEDED
Status: DISCONTINUED | OUTPATIENT
Start: 2021-11-12 | End: 2021-11-12 | Stop reason: HOSPADM

## 2021-11-12 RX ORDER — CLONAZEPAM 1 MG/1
1 TABLET ORAL 2 TIMES DAILY
COMMUNITY
End: 2022-08-03

## 2021-11-12 RX ORDER — SODIUM CHLORIDE 0.9 % (FLUSH) 0.9 %
5-40 SYRINGE (ML) INJECTION AS NEEDED
Status: DISCONTINUED | OUTPATIENT
Start: 2021-11-12 | End: 2021-11-12 | Stop reason: HOSPADM

## 2021-11-12 RX ORDER — VERAPAMIL HYDROCHLORIDE 2.5 MG/ML
INJECTION, SOLUTION INTRAVENOUS AS NEEDED
Status: DISCONTINUED | OUTPATIENT
Start: 2021-11-12 | End: 2021-11-12 | Stop reason: HOSPADM

## 2021-11-12 NOTE — PROGRESS NOTES
TRANSFER - IN REPORT:    Verbal report received from [facility] on Zia Gonsaels  being received from cath lab for routine progression of care. Report consisted of patients Situation, Background, Assessment and Recommendations(SBAR). Information from the following report(s) SBAR was reviewed with the receiving clinician. Opportunity for questions and clarification was provided. Assessment completed upon patients arrival to [address] [place] [address] and care assumed. Cardiac Cath Lab Recovery Arrival Note:    Zia Gonsales arrived to [facility] recovery area. Patient procedure= heart cath. Patient on cardiac monitor, non-invasive blood pressure, SPO2 monitor. On RA . IV  of nacl on pump at 75 ml/hr. Patient status doing well without problems. Patient is A&Ox 4. Patient reports no complaints. PROCEDURE SITE CHECK:    Procedure site:without any bleeding and no hematoma, no pain/discomfort reported at procedure site. No change in patient status. Continue to monitor patient and status.

## 2021-11-12 NOTE — DISCHARGE INSTR - DIET
[address] [facility], [address]   [phone]  [address]    [email or web address]    Patient Discharge Instructions    Crissy Mendez / [id number] : 1965    Admitted 2021 Discharged: 2021       It is important that you take the medication exactly as they are prescribed. Keep your medication in the bottles provided by the pharmacist and keep a list of the medication names, dosages, and times to be taken in your wallet. Do not take other medications without consulting your doctor. BRING ALL OF YOUR MEDICINES TO YOUR OFFICE VISIT with my nurse practitioner, Elroy Gil. .    Follow-up with my nurse practitioner, Elroy Gil in 2 weeks. Cardiac Catheterization  Discharge Instructions    Transradial Catheterization Discharge Instructions (WRIST)    Discharge instructions: Your radial artery in your wrist was used for your cardiac catheterization. This site may be slightly bruised and sore following your procedure. Expect mild tingling or the hand and tenderness at the puncture site for up to 3 days. Excess movement of the wrist used should be avoided for the next 24-48 hours. No lifting over 2 pounds (approximately a ½ gallon of milk) with this arm for 24 hours. Keep the site of the procedure covered with a bandage for 24 hours. You may shower the day after your procedure. Do not take a tub bath or submerge the puncture site in water for 48 hours. No heavy impact activity/lifting > 30 pounds for 1 week. If bleeding of the wrist occurs at home:   If the site on your wrist where you had the catheterization procedure begins to bleed, do not panic. Place 1 or 2 fingers over the puncture site and hold pressure to stop the bleeding. You may be able to feel your pulse as you hold pressure. Lift your fingers after 5 minutes to see if the bleeding has stopped. Once the bleeding has stopped, gently wipe the wrist area clean and cover with a bandage.      If the bleeding from your wrist does not stop after 15 minutes, or if there is a large amount of bleeding or spurting, call 911 immediately (do not drive yourself to the hospital). Other concerns: The site may be slightly bruised and sore following your procedure. Should any of the following occur, contact your physician immediately:   Any cool or coldness of the arm, discoloration over a large area, ongoing numbness or any abnormal sensations , moderate to severe pain or swelling in the arm. Redness, soreness, swelling, chills or fever, or colored drainage at the procedure site within 3-7 days after your procedure. If you have any further questions or concerns regarding your procedure please call the Cardiac Cath Lab office at [phone]. During regular business hours ask to speak to Dr. Haley Vargas. During non-business hours the answering service will answer. Ask to speak to the physician on call for [facility].     Transfemoral Catheterization Discharge Instructions (GROIN)    Do not drive, operate any machinery, or sign any legal documents for 24 hours after your procedure. You must have someone to drive you home. You may take a shower 24 hours after your cardiac catheterization. Be sure to get the dressing wet and then remove it; gently wash the area with warm soapy water. Pat dry and leave open to air. To help prevent infections, be sure to keep the cath site clean and dry. No lotions, creams, powders, ointments, etc. in the cath site for approximately 1 week. Do not take a tub bath, get in a hot tub or swimming pool for approximately 5 days or until the cath site is completely healed. No strenuous activity or heavy lifting over 10 lbs. for 7 days. Drink plenty of fluids for 24-48 hours after your cath to flush the contrast dye from your kidneys. No alcoholic beverages for 24 hours. You may resume your previous diet (low fat, low cholesterol) after your cath. After your cath, some bruising or discomfort is common during the healing process. Tylenol, 1-2 tablets every 6 hours as needed, is recommended if you experience any discomfort. If you experience any signs or symptoms of infection such as fever, chills, or poorly healing incision, persistent tenderness or swelling in the groin, redness and/or warmth to the touch, numbness, significant tingling or pain at the groin site or affected extremity, rash, drainage from the cath site, or if the leg feels tight or swollen, call your physician right away. If bleeding at the cath site occurs, take a clean gauze pad and apply direct pressure to the groin just above the puncture site. Call 911 immediately, and continue to apply direct pressure until an ambulance gets to your location. You may return to work  2  days after your cardiac cath if no groin bleeding. Information obtained by :  I understand that if any problems occur once I am at home I am to contact my physician. I understand and acknowledge receipt of the instructions indicated above. R.N.'s Signature                                                                  Date/Time                                                                                                                                              Patient or Representative Signature                                                          Date/Time      Julio Bond III, DO             [address]    [phone]  [facility] [address] [facility]    [email or web address]

## 2021-11-12 NOTE — Clinical Note
TRANSFER - OUT REPORT:     Verbal report given to: Rodrigue, RN, (at bedside). Report consisted of patient's Situation, Background, Assessment and   Recommendations(SBAR). Opportunity for questions and clarification was provided. Patient transported to: recovery.

## 2021-11-12 NOTE — PROGRESS NOTES
Ambulate in [place] with pt to BR. Gait steady. Right radial dressing dry and intact. Pt denies c/o. DC instructions reviewed with pt. He verbalizes understanding. SL dc'd without difficulty. Pt wheeled to front door to be driven home by wife.

## 2021-11-12 NOTE — PROGRESS NOTES
Cardiac Cath Lab Recovery Arrival Note:      Sona John arrived to Cardiac Cath Lab, Recovery Area. Staff introduced to patient. Patient identifiers verified with NAME and DATE OF BIRTH. Procedure verified with patient. Consent forms reviewed and signed by patient or authorized representative and verified. Allergies verified. Patient and family oriented to department. Patient and family informed of procedure and plan of care. Questions answered with review. Patient prepped for procedure, per orders from physician, prior to arrival.    Patient on cardiac monitor, non-invasive blood pressure, SPO2 monitor. On room air. Patient is A&Ox 3. Patient reports no c/o. Patient in stretcher, in low position, with side rails up, call bell within reach, patient instructed to call if assistance as needed. Patient prep in: [address], [place] [address]. Patient family has pager # none  Family in: [address] [facility] waiting area.    Prep by: Matthew Gottron and Yana Mujica RN

## 2021-11-12 NOTE — PROGRESS NOTES
Brief Procedure Note    Patient: Seven Catherine MRN: [id number]  SSN: [id number]    YOB: 1965  Age: 60 y.o. Sex: male      Date of Procedure: 11/12/2021     Pre-procedure Diagnosis: CP    Post-procedure Diagnosis: No angiographic epicardial CAD, nml LVEDP    Procedure: LHC, cors    Performed By: Lisa Melgar III, DO     Anesthesia: Moderate Sedation    Estimated Blood Loss: Less than 10 mL      Specimens:  None    Findings: as above    Complications: None    Implants: None    Recommendations: Continue medical therapy.     Signed By: Javy Bertrand DO     November 12, 2021

## 2021-11-12 NOTE — PROGRESS NOTES
Primary Nurse Maryjane Bey RN and Tung Anthony RN performed a dual skin assessment on this patient No impairment noted  Jeff score is 23

## 2021-12-22 NOTE — CARDIO/PULMONARY
Cardiac Rehab Note: chart review/referral     Cardiac cath 11/12/21:  \"No angiographic epicardial CAD, nml LVEDP\"    Smoking history assessed. Patient is a former smoker. Smoking Cessation Program link has not been added to the AVS.     Patient not seen at this time.

## 2022-05-04 ENCOUNTER — APPOINTMENT (OUTPATIENT)
Dept: CT IMAGING | Age: 57
DRG: 069 | End: 2022-05-04
Attending: EMERGENCY MEDICINE
Payer: MEDICARE

## 2022-05-04 ENCOUNTER — APPOINTMENT (OUTPATIENT)
Dept: MRI IMAGING | Age: 57
DRG: 069 | End: 2022-05-04
Attending: STUDENT IN AN ORGANIZED HEALTH CARE EDUCATION/TRAINING PROGRAM
Payer: MEDICARE

## 2022-05-04 ENCOUNTER — APPOINTMENT (OUTPATIENT)
Dept: GENERAL RADIOLOGY | Age: 57
DRG: 069 | End: 2022-05-04
Attending: EMERGENCY MEDICINE
Payer: MEDICARE

## 2022-05-04 ENCOUNTER — HOSPITAL ENCOUNTER (INPATIENT)
Age: 57
LOS: 2 days | Discharge: HOME | DRG: 069 | End: 2022-05-06
Attending: EMERGENCY MEDICINE | Admitting: STUDENT IN AN ORGANIZED HEALTH CARE EDUCATION/TRAINING PROGRAM
Payer: MEDICARE

## 2022-05-04 DIAGNOSIS — I63.9 CEREBROVASCULAR ACCIDENT (CVA), UNSPECIFIED MECHANISM (HCC): Primary | ICD-10-CM

## 2022-05-04 PROBLEM — R20.2 PARESTHESIA: Status: ACTIVE | Noted: 2022-05-04

## 2022-05-04 LAB
ALBUMIN SERPL-MCNC: 3.3 G/DL (ref 3.5–5)
ALBUMIN/GLOB SERPL: 1.1 {RATIO} (ref 1.1–2.2)
ALP SERPL-CCNC: 93 U/L (ref 45–117)
ALT SERPL-CCNC: 40 U/L (ref 12–78)
ANION GAP SERPL CALC-SCNC: 2 MMOL/L (ref 5–15)
AST SERPL-CCNC: 24 U/L (ref 15–37)
ATRIAL RATE: 76 BPM
BASOPHILS # BLD: 0 K/UL (ref 0–0.1)
BASOPHILS NFR BLD: 1 % (ref 0–1)
BILIRUB SERPL-MCNC: 0.5 MG/DL (ref 0.2–1)
BUN SERPL-MCNC: 13 MG/DL (ref 6–20)
BUN/CREAT SERPL: 11 (ref 12–20)
CALCIUM SERPL-MCNC: 8.6 MG/DL (ref 8.5–10.1)
CALCULATED P AXIS, ECG09: 68 DEGREES
CALCULATED R AXIS, ECG10: 68 DEGREES
CALCULATED T AXIS, ECG11: 70 DEGREES
CHLORIDE SERPL-SCNC: 102 MMOL/L (ref 97–108)
CO2 SERPL-SCNC: 29 MMOL/L (ref 21–32)
CREAT SERPL-MCNC: 1.2 MG/DL (ref 0.7–1.3)
DIAGNOSIS, 93000: NORMAL
DIFFERENTIAL METHOD BLD: ABNORMAL
EOSINOPHIL # BLD: 0.1 K/UL (ref 0–0.4)
EOSINOPHIL NFR BLD: 2 % (ref 0–7)
ERYTHROCYTE [DISTWIDTH] IN BLOOD BY AUTOMATED COUNT: 13.3 % (ref 11.5–14.5)
GLOBULIN SER CALC-MCNC: 3 G/DL (ref 2–4)
GLUCOSE BLD STRIP.AUTO-MCNC: 109 MG/DL (ref 65–117)
GLUCOSE SERPL-MCNC: 114 MG/DL (ref 65–100)
HCT VFR BLD AUTO: 39.3 % (ref 36.6–50.3)
HGB BLD-MCNC: 14 G/DL (ref 12.1–17)
IMM GRANULOCYTES # BLD AUTO: 0 K/UL (ref 0–0.04)
IMM GRANULOCYTES NFR BLD AUTO: 1 % (ref 0–0.5)
INR PPP: 1 (ref 0.9–1.1)
LYMPHOCYTES # BLD: 2.5 K/UL (ref 0.8–3.5)
LYMPHOCYTES NFR BLD: 36 % (ref 12–49)
MAGNESIUM SERPL-MCNC: 2.2 MG/DL (ref 1.6–2.4)
MCH RBC QN AUTO: 31.3 PG (ref 26–34)
MCHC RBC AUTO-ENTMCNC: 35.6 G/DL (ref 30–36.5)
MCV RBC AUTO: 87.7 FL (ref 80–99)
MONOCYTES # BLD: 0.7 K/UL (ref 0–1)
MONOCYTES NFR BLD: 10 % (ref 5–13)
NEUTS SEG # BLD: 3.5 K/UL (ref 1.8–8)
NEUTS SEG NFR BLD: 50 % (ref 32–75)
NRBC # BLD: 0 K/UL (ref 0–0.01)
NRBC BLD-RTO: 0 PER 100 WBC
P-R INTERVAL, ECG05: 230 MS
PLATELET # BLD AUTO: 149 K/UL (ref 150–400)
PMV BLD AUTO: 9.8 FL (ref 8.9–12.9)
POTASSIUM SERPL-SCNC: 4 MMOL/L (ref 3.5–5.1)
PROT SERPL-MCNC: 6.3 G/DL (ref 6.4–8.2)
PROTHROMBIN TIME: 10.5 SEC (ref 9–11.1)
Q-T INTERVAL, ECG07: 402 MS
QRS DURATION, ECG06: 90 MS
QTC CALCULATION (BEZET), ECG08: 452 MS
RBC # BLD AUTO: 4.48 M/UL (ref 4.1–5.7)
SERVICE CMNT-IMP: NORMAL
SODIUM SERPL-SCNC: 133 MMOL/L (ref 136–145)
TROPONIN-HIGH SENSITIVITY: <4 NG/L (ref 0–76)
TSH SERPL DL<=0.05 MIU/L-ACNC: 0.86 UIU/ML (ref 0.36–3.74)
VENTRICULAR RATE, ECG03: 76 BPM
WBC # BLD AUTO: 6.9 K/UL (ref 4.1–11.1)

## 2022-05-04 PROCEDURE — 70551 MRI BRAIN STEM W/O DYE: CPT

## 2022-05-04 PROCEDURE — 93005 ELECTROCARDIOGRAM TRACING: CPT

## 2022-05-04 PROCEDURE — 74011250637 HC RX REV CODE- 250/637: Performed by: EMERGENCY MEDICINE

## 2022-05-04 PROCEDURE — 84484 ASSAY OF TROPONIN QUANT: CPT

## 2022-05-04 PROCEDURE — 99285 EMERGENCY DEPT VISIT HI MDM: CPT

## 2022-05-04 PROCEDURE — 70450 CT HEAD/BRAIN W/O DYE: CPT

## 2022-05-04 PROCEDURE — 82962 GLUCOSE BLOOD TEST: CPT

## 2022-05-04 PROCEDURE — 83735 ASSAY OF MAGNESIUM: CPT

## 2022-05-04 PROCEDURE — 74011000250 HC RX REV CODE- 250: Performed by: STUDENT IN AN ORGANIZED HEALTH CARE EDUCATION/TRAINING PROGRAM

## 2022-05-04 PROCEDURE — 74011000636 HC RX REV CODE- 636: Performed by: EMERGENCY MEDICINE

## 2022-05-04 PROCEDURE — 65270000046 HC RM TELEMETRY

## 2022-05-04 PROCEDURE — 85610 PROTHROMBIN TIME: CPT

## 2022-05-04 PROCEDURE — 74011250636 HC RX REV CODE- 250/636: Performed by: EMERGENCY MEDICINE

## 2022-05-04 PROCEDURE — 85025 COMPLETE CBC W/AUTO DIFF WBC: CPT

## 2022-05-04 PROCEDURE — 84443 ASSAY THYROID STIM HORMONE: CPT

## 2022-05-04 PROCEDURE — 74011250637 HC RX REV CODE- 250/637: Performed by: STUDENT IN AN ORGANIZED HEALTH CARE EDUCATION/TRAINING PROGRAM

## 2022-05-04 PROCEDURE — 0042T CT CODE NEURO PERF W CBF: CPT

## 2022-05-04 PROCEDURE — 36415 COLL VENOUS BLD VENIPUNCTURE: CPT

## 2022-05-04 PROCEDURE — 4A03X5D MEASUREMENT OF ARTERIAL FLOW, INTRACRANIAL, EXTERNAL APPROACH: ICD-10-PCS | Performed by: STUDENT IN AN ORGANIZED HEALTH CARE EDUCATION/TRAINING PROGRAM

## 2022-05-04 PROCEDURE — 70496 CT ANGIOGRAPHY HEAD: CPT

## 2022-05-04 PROCEDURE — 71045 X-RAY EXAM CHEST 1 VIEW: CPT

## 2022-05-04 PROCEDURE — 80053 COMPREHEN METABOLIC PANEL: CPT

## 2022-05-04 RX ORDER — PANTOPRAZOLE SODIUM 40 MG/1
40 TABLET, DELAYED RELEASE ORAL DAILY
COMMUNITY
End: 2022-05-06

## 2022-05-04 RX ORDER — GABAPENTIN 300 MG/1
300 CAPSULE ORAL 3 TIMES DAILY
Status: DISCONTINUED | OUTPATIENT
Start: 2022-05-04 | End: 2022-05-06 | Stop reason: HOSPADM

## 2022-05-04 RX ORDER — ESCITALOPRAM OXALATE 10 MG/1
40 TABLET ORAL DAILY
Status: DISCONTINUED | OUTPATIENT
Start: 2022-05-05 | End: 2022-05-06 | Stop reason: HOSPADM

## 2022-05-04 RX ORDER — PANTOPRAZOLE SODIUM 40 MG/1
40 TABLET, DELAYED RELEASE ORAL
Status: DISCONTINUED | OUTPATIENT
Start: 2022-05-05 | End: 2022-05-06 | Stop reason: HOSPADM

## 2022-05-04 RX ORDER — CLONAZEPAM 0.5 MG/1
1 TABLET ORAL 2 TIMES DAILY
Status: DISCONTINUED | OUTPATIENT
Start: 2022-05-04 | End: 2022-05-06 | Stop reason: HOSPADM

## 2022-05-04 RX ORDER — ATORVASTATIN CALCIUM 40 MG/1
40 TABLET, FILM COATED ORAL
Status: DISCONTINUED | OUTPATIENT
Start: 2022-05-04 | End: 2022-05-06 | Stop reason: HOSPADM

## 2022-05-04 RX ORDER — MECLIZINE HCL 12.5 MG 12.5 MG/1
25 TABLET ORAL
Status: DISCONTINUED | OUTPATIENT
Start: 2022-05-04 | End: 2022-05-06 | Stop reason: HOSPADM

## 2022-05-04 RX ORDER — GUAIFENESIN 100 MG/5ML
81 LIQUID (ML) ORAL DAILY
Status: DISCONTINUED | OUTPATIENT
Start: 2022-05-05 | End: 2022-05-06 | Stop reason: HOSPADM

## 2022-05-04 RX ORDER — ARIPIPRAZOLE 5 MG/1
20 TABLET ORAL DAILY
Status: DISCONTINUED | OUTPATIENT
Start: 2022-05-05 | End: 2022-05-06 | Stop reason: HOSPADM

## 2022-05-04 RX ORDER — ACETAMINOPHEN 650 MG/1
650 SUPPOSITORY RECTAL
Status: DISCONTINUED | OUTPATIENT
Start: 2022-05-04 | End: 2022-05-06 | Stop reason: HOSPADM

## 2022-05-04 RX ORDER — ROSUVASTATIN CALCIUM 20 MG/1
20 TABLET, COATED ORAL
Status: DISCONTINUED | OUTPATIENT
Start: 2022-05-04 | End: 2022-05-04

## 2022-05-04 RX ORDER — ENOXAPARIN SODIUM 100 MG/ML
30 INJECTION SUBCUTANEOUS EVERY 12 HOURS
Status: DISCONTINUED | OUTPATIENT
Start: 2022-05-05 | End: 2022-05-06 | Stop reason: HOSPADM

## 2022-05-04 RX ORDER — CLOPIDOGREL BISULFATE 75 MG/1
300 TABLET ORAL
Status: COMPLETED | OUTPATIENT
Start: 2022-05-04 | End: 2022-05-04

## 2022-05-04 RX ORDER — ASPIRIN 325 MG
325 TABLET ORAL ONCE
Status: COMPLETED | OUTPATIENT
Start: 2022-05-04 | End: 2022-05-04

## 2022-05-04 RX ORDER — ACETAMINOPHEN 325 MG/1
650 TABLET ORAL
Status: DISCONTINUED | OUTPATIENT
Start: 2022-05-04 | End: 2022-05-06 | Stop reason: HOSPADM

## 2022-05-04 RX ADMIN — SODIUM CHLORIDE 1000 ML: 9 INJECTION, SOLUTION INTRAVENOUS at 14:34

## 2022-05-04 RX ADMIN — ATORVASTATIN CALCIUM 40 MG: 40 TABLET, FILM COATED ORAL at 22:06

## 2022-05-04 RX ADMIN — ESZOPICLONE 3 MG: 2 TABLET, FILM COATED ORAL at 22:09

## 2022-05-04 RX ADMIN — ALUMINUM HYDROXIDE AND MAGNESIUM HYDROXIDE 40 ML: 200; 200 SUSPENSION ORAL at 18:24

## 2022-05-04 RX ADMIN — ASPIRIN 325 MG ORAL TABLET 325 MG: 325 PILL ORAL at 15:07

## 2022-05-04 RX ADMIN — CLONAZEPAM 1 MG: 0.5 TABLET ORAL at 18:24

## 2022-05-04 RX ADMIN — GABAPENTIN 300 MG: 300 CAPSULE ORAL at 22:06

## 2022-05-04 RX ADMIN — GABAPENTIN 300 MG: 300 CAPSULE ORAL at 18:24

## 2022-05-04 RX ADMIN — CLOPIDOGREL BISULFATE 300 MG: 75 TABLET ORAL at 15:07

## 2022-05-04 RX ADMIN — IOPAMIDOL 100 ML: 755 INJECTION, SOLUTION INTRAVENOUS at 14:28

## 2022-05-04 NOTE — ED NOTES
Patient is being transferred to [facility], Room # 1489. Report given to Marycarmen Hernandes on Damien Friend for routine progression of care. Report consisted of the following information SBAR and ED Summary. Patient transferred to receiving unit by: transporter (RN or tech name). Outstanding consults needed: Yes    Next labs due: Yes    The following personal items will be sent with the patient during transfer to the floor: All valuables:    Cardiac monitoring ordered: Yes    The following CURRENT information was reported to the receiving RN:    Code status: Full Code at time of transfer    Last set of vital signs:  Vital Signs  Level of Consciousness: Alert (0) (05/04/22 1628)  Temp: 97.7 °F (36.5 °C) (05/04/22 1434)  Temp Source: Oral (05/04/22 1434)  Pulse (Heart Rate): 68 (05/04/22 1628)  Heart Rate Source: Monitor (05/04/22 1628)  Cardiac Rhythm: Sinus Rhythm (05/04/22 1628)  Resp Rate: 12 (05/04/22 1628)  BP: 132/79 (05/04/22 1628)  MAP (Monitor): 93 (05/04/22 1628)  MAP (Calculated): 97 (05/04/22 1628)         Oxygen Therapy  O2 Sat (%): 96 % (05/04/22 1628)  Pulse via Oximetry: 66 beats per minute (05/04/22 1628)  O2 Device: None (Room air) (05/04/22 1628)      Last pain assessment:         Wounds: No     Urinary catheter: voiding  Is there a blair order: No     LDAs:       Peripheral IV 05/04/22 Right Antecubital (Active)         Opportunity for questions and clarification was provided.     Radu Kaur RN

## 2022-05-04 NOTE — ED PROVIDER NOTES
EMERGENCY DEPARTMENT HISTORY AND PHYSICAL EXAM      Date: 5/4/2022  Patient Name: French Quezada    History of Presenting Illness     Chief Complaint   Patient presents with    Numbness     last know well 1000- arrived via ems with c/o numbness left arm and ringling head       History Provided By: Patient    HPI: French Quezada, 59 y.o. male presents to the ED with cc of numbness. Patient states at approximately 10:00 this morning he started to develop left perioral numbness. He states that he also noted that his left arm had some numbness in it as well as his left leg. He denies any speech deficits. He states he had not noticed any facial asymmetry and no strength loss. He denies any headache. He denies any blurred vision. Denies being lightheaded or dizzy. He denies any chest pain or shortness of breath. There is been no palpitations. He denies abdominal pain, nausea, vomiting or diarrhea. There is been no loss of bowel or bladder. He denies any recent trauma. Patient with no prior history of strokes is not on any blood thinners. There are no other complaints, changes, or physical findings at this time. PCP: Wilfredo Sommer MD    No current facility-administered medications on file prior to encounter. Current Outpatient Medications on File Prior to Encounter   Medication Sig Dispense Refill    pantoprazole (Protonix) 40 mg tablet Take 40 mg by mouth daily.  clonazePAM (KlonoPIN) 1 mg tablet Take 1 mg by mouth two (2) times a day.  gabapentin (NEURONTIN) 300 mg capsule Take 300 mg by mouth three (3) times daily.  eszopiclone (LUNESTA) 3 mg tablet Take 3 mg by mouth nightly.  lisinopril-hydrochlorothiazide (PRINZIDE, ZESTORETIC) 20-12.5 mg per tablet Take 1 Tab by mouth daily. 1 tablet daily for 30 days per prescription      ARIPiprazole (ABILIFY) 20 mg tablet Take 20 mg by mouth daily.  meclizine (ANTIVERT) 25 mg tablet Take  by mouth three (3) times daily as needed.  escitalopram (LEXAPRO) 20 mg tablet Take 40 mg by mouth daily.  rosuvastatin (CRESTOR) 20 mg tablet Take 20 mg by mouth nightly.  ondansetron (ZOFRAN ODT) 4 mg disintegrating tablet Take 1 Tab by mouth every eight (8) hours as needed for Nausea. (Patient not taking: Reported on 5/4/2022) 10 Tab 0    naproxen (NAPROSYN) 500 mg tablet Take 1 Tab by mouth every twelve (12) hours as needed for Pain. (Patient not taking: Reported on 5/4/2022) 20 Tab 0    acetaminophen (TYLENOL) 500 mg tablet Take 500 mg by mouth nightly as needed for Pain. Indications: ARTHRITIC PAIN      omeprazole (PRILOSEC) 20 mg capsule Take 20 mg by mouth daily. (Patient not taking: Reported on 5/4/2022)      MULTI-VITAMIN PO Take  by mouth. (Patient not taking: Reported on 5/4/2022)      omega-3 fatty acids-vitamin e (FISH OIL) 1,000 mg Cap Take 4 Caps by mouth daily. (Patient not taking: Reported on 5/4/2022)         Past History     Past Medical History:  Past Medical History:   Diagnosis Date    Cholelithiasis 8/22/2011    High cholesterol     Hypertension     Other ill-defined conditions(799.89)     hypercholesterolemia    Psychiatric disorder     anxiety    Seasonal allergies     Status post laparoscopic cholecystectomy 8/25/11       Past Surgical History:  Past Surgical History:   Procedure Laterality Date    HX CHOLECYSTECTOMY      HX ORTHOPAEDIC      back srgery, arm surgery    [place] LAP,CHOLECYSTECTOMY  8/25/11       Family History:  Family History   Problem Relation Age of Onset    Heart Disease Mother     Headache Mother     Heart Disease Maternal Grandfather     Heart Disease Paternal Grandmother     Headache Sister     Headache Brother        Social History:  Social History     Tobacco Use    Smoking status: Former Smoker    Smokeless tobacco: Former User     Quit date: 1/1/2001   Substance Use Topics    Alcohol use:  Yes     Alcohol/week: 28.0 standard drinks     Types: 28 Cans of beer per week    Drug use: No       Allergies: Allergies   Allergen Reactions    Codeine Nausea and Vomiting    Dilaudid [Hydromorphone (Bulk)] Nausea and Vomiting     Caused N&V when given in the hospital    Morphine Rash         Review of Systems   Review of Systems   Constitutional: Negative. Negative for appetite change, chills, fatigue and fever. HENT: Negative. Negative for congestion, rhinorrhea, sinus pressure and sore throat. Eyes: Negative. Respiratory: Negative. Negative for cough, choking, chest tightness, shortness of breath and wheezing. Cardiovascular: Negative. Negative for chest pain, palpitations and leg swelling. Gastrointestinal: Negative for abdominal pain, constipation, diarrhea, nausea and vomiting. Endocrine: Negative. Genitourinary: Negative. Negative for difficulty urinating, dysuria, flank pain and urgency. Musculoskeletal: Negative. Skin: Negative. Neurological: Positive for weakness and numbness. Negative for dizziness, speech difficulty, light-headedness and headaches. Psychiatric/Behavioral: Negative. All other systems reviewed and are negative. Physical Exam   Physical Exam  Vitals and nursing note reviewed. Constitutional:       General: He is not in acute distress. Appearance: He is well-developed. He is not diaphoretic. HENT:      Head: Normocephalic and atraumatic. Mouth/Throat:      Pharynx: No oropharyngeal exudate. Eyes:      Conjunctiva/sclera: Conjunctivae normal.      Pupils: Pupils are equal, round, and reactive to light. Neck:      Vascular: No JVD. Trachea: No tracheal deviation. Cardiovascular:      Rate and Rhythm: Normal rate and regular rhythm. Heart sounds: Normal heart sounds. No murmur heard. Pulmonary:      Effort: Pulmonary effort is normal. No respiratory distress. Breath sounds: Normal breath sounds. No stridor. No wheezing or rales. Abdominal:      General: There is no distension. Palpations: Abdomen is soft. Tenderness: There is no abdominal tenderness. There is no guarding or rebound. Musculoskeletal:         General: No tenderness. Normal range of motion. Cervical back: Normal range of motion and neck supple. Skin:     General: Skin is warm and dry. Neurological:      Mental Status: He is alert and oriented to person, place, and time. Cranial Nerves: No cranial nerve deficit. Comments: Mild left facial droop, strength 4/5 in left lower ext, perceive decrease sensation left face, no word finding difficulty, no word slurring, no left arm drift      Psychiatric:         Behavior: Behavior normal.         Diagnostic Study Results     Labs -     Recent Results (from the past 12 hour(s))   GLUCOSE, POC    Collection Time: 05/04/22  2:06 PM   Result Value Ref Range    Glucose (POC) 109 65 - 117 mg/dL    Performed by Jerry GREER    CBC WITH AUTOMATED DIFF    Collection Time: 05/04/22  2:12 PM   Result Value Ref Range    WBC 6.9 4.1 - 11.1 K/uL    RBC 4.48 4.10 - 5.70 M/uL    HGB 14.0 12.1 - 17.0 g/dL    HCT 39.3 36.6 - 50.3 %    MCV 87.7 80.0 - 99.0 FL    MCH 31.3 26.0 - 34.0 PG    MCHC 35.6 30.0 - 36.5 g/dL    RDW 13.3 11.5 - 14.5 %    PLATELET 796 (L) 993 - 400 K/uL    MPV 9.8 8.9 - 12.9 FL    NRBC 0.0 0  WBC    ABSOLUTE NRBC 0.00 0.00 - 0.01 K/uL    NEUTROPHILS 50 32 - 75 %    LYMPHOCYTES 36 12 - 49 %    MONOCYTES 10 5 - 13 %    EOSINOPHILS 2 0 - 7 %    BASOPHILS 1 0 - 1 %    IMMATURE GRANULOCYTES 1 (H) 0.0 - 0.5 %    ABS. NEUTROPHILS 3.5 1.8 - 8.0 K/UL    ABS. LYMPHOCYTES 2.5 0.8 - 3.5 K/UL    ABS. MONOCYTES 0.7 0.0 - 1.0 K/UL    ABS. EOSINOPHILS 0.1 0.0 - 0.4 K/UL    ABS. BASOPHILS 0.0 0.0 - 0.1 K/UL    ABS. IMM.  GRANS. 0.0 0.00 - 0.04 K/UL    DF AUTOMATED     PROTHROMBIN TIME + INR    Collection Time: 05/04/22  2:12 PM   Result Value Ref Range    INR 1.0 0.9 - 1.1      Prothrombin time 10.5 9.0 - 11.1 sec   TROPONIN-HIGH SENSITIVITY    Collection Time: 05/04/22  2:12 PM   Result Value Ref Range    Troponin-High Sensitivity <4 0 - 76 ng/L   MAGNESIUM    Collection Time: 05/04/22  2:12 PM   Result Value Ref Range    Magnesium 2.2 1.6 - 2.4 mg/dL   TSH 3RD GENERATION    Collection Time: 05/04/22  2:12 PM   Result Value Ref Range    TSH 0.86 0.36 - 1.53 uIU/mL   METABOLIC PANEL, COMPREHENSIVE    Collection Time: 05/04/22  2:12 PM   Result Value Ref Range    Sodium 133 (L) 136 - 145 mmol/L    Potassium 4.0 3.5 - 5.1 mmol/L    Chloride 102 97 - 108 mmol/L    CO2 29 21 - 32 mmol/L    Anion gap 2 (L) 5 - 15 mmol/L    Glucose 114 (H) 65 - 100 mg/dL    BUN 13 6 - 20 MG/DL    Creatinine 1.20 0.70 - 1.30 MG/DL    BUN/Creatinine ratio 11 (L) 12 - 20      GFR est AA >60 >60 ml/min/1.73m2    GFR est non-AA >60 >60 ml/min/1.73m2    Calcium 8.6 8.5 - 10.1 MG/DL    Bilirubin, total 0.5 0.2 - 1.0 MG/DL    ALT (SGPT) 40 12 - 78 U/L    AST (SGOT) 24 15 - 37 U/L    Alk. phosphatase 93 45 - 117 U/L    Protein, total 6.3 (L) 6.4 - 8.2 g/dL    Albumin 3.3 (L) 3.5 - 5.0 g/dL    Globulin 3.0 2.0 - 4.0 g/dL    A-G Ratio 1.1 1.1 - 2.2     EKG, 12 LEAD, INITIAL    Collection Time: 05/04/22  2:46 PM   Result Value Ref Range    Ventricular Rate 76 BPM    Atrial Rate 76 BPM    P-R Interval 230 ms    QRS Duration 90 ms    Q-T Interval 402 ms    QTC Calculation (Bezet) 452 ms    Calculated P Axis 68 degrees    Calculated R Axis 68 degrees    Calculated T Axis 70 degrees    Diagnosis       Sinus rhythm with 1st degree AV block  When compared with ECG of 10-JUL-2021 18:55,  No significant change was found         Radiologic Studies -   XR CHEST PORT   Final Result   No acute cardiopulmonary disease. CTA CODE NEURO HEAD AND NECK W CONT   Final Result   No large vessel occlusion or hemodynamically significant carotid stenosis. No perfusion abnormality. CT CODE NEURO PERF W CBF   Final Result   No large vessel occlusion or hemodynamically significant carotid stenosis.    No perfusion abnormality. CT CODE NEURO HEAD WO CONTRAST   Final Result   No acute intracranial hemorrhage, mass or infarct. MRI BRAIN WO CONT    (Results Pending)     CT Results  (Last 48 hours)               05/04/22 1427  CTA CODE NEURO HEAD AND NECK W CONT Final result    Impression:  No large vessel occlusion or hemodynamically significant carotid stenosis. No perfusion abnormality. Narrative:  CLINICAL HISTORY: Code stroke. Altered mental status. EXAMINATION:  CT ANGIOGRAPHY HEAD AND NECK, CT PERFUSION       COMPARISON: Head CT January 2017       TECHNIQUE:  Following the uneventful administration of iodinated contrast   material, axial CT angiography of the head and neck was performed. Delayed axial   images through the head were also obtained. Coronal and sagittal reconstructions   were obtained. Manual postprocessing of images was performed. 3-D  Sagittal   maximal intensity projection images were obtained. 3-D Coronal maximal   intensity projections were obtained. CT brain perfusion was performed with   generation of hemodynamic maps of multiple parameters, including cerebral blood   flow, cerebral blood volume, mean transit time, and TMAX. CT dose reduction was   achieved through use of a standardized protocol tailored for this examination   and automatic exposure control for dose modulation. This study was analyzed by   the [address] ai algorithm. FINDINGS:       Delayed contrast-enhanced head CT:       The ventricles are midline without hydrocephalus. There is no acute intra or   extra-axial hemorrhage. The basal cisterns are clear. The paranasal sinuses are   clear. CTA NECK:       Great vessels: Normal arch anatomy with the origins patent.        Right subclavian artery: Patent       Left subclavian artery: Patent        Right common carotid artery: Patent        Left common carotid artery: Patent        Cervical right internal carotid artery: Patent with no significant stenosis by   NASCET criteria. Cervical left internal carotid artery: Patent with no significant stenosis by   NASCET criteria. Right vertebral artery: Patent       Left vertebral artery: Nondominant, patent       The lung apices are clear. The thyroid is homogeneous. No cervical   lymphadenopathy. CTA HEAD:       Right cavernous internal carotid artery: Patent       Left cavernous internal carotid artery: Patent       Anterior cerebral arteries: Patent       Anterior communicating artery: Patent       Right middle cerebral artery: Patent       Left middle cerebral artery: Patent       Posterior communicating arteries: Patent       Posterior cerebral arteries: Patent       Basilar artery: Patent       Distal vertebral arteries: Patent       No evidence for intracranial aneurysm or hemodynamically significant stenosis. CT Perfusion:   Normal CT perfusion. 05/04/22 1427  CT CODE NEURO PERF W CBF Final result    Impression:  No large vessel occlusion or hemodynamically significant carotid stenosis. No perfusion abnormality. Narrative:  CLINICAL HISTORY: Code stroke. Altered mental status. EXAMINATION:  CT ANGIOGRAPHY HEAD AND NECK, CT PERFUSION       COMPARISON: Head CT January 2017       TECHNIQUE:  Following the uneventful administration of iodinated contrast   material, axial CT angiography of the head and neck was performed. Delayed axial   images through the head were also obtained. Coronal and sagittal reconstructions   were obtained. Manual postprocessing of images was performed. 3-D  Sagittal   maximal intensity projection images were obtained. 3-D Coronal maximal   intensity projections were obtained. CT brain perfusion was performed with   generation of hemodynamic maps of multiple parameters, including cerebral blood   flow, cerebral blood volume, mean transit time, and TMAX.  CT dose reduction was   achieved through use of a standardized protocol tailored for this examination   and automatic exposure control for dose modulation. This study was analyzed by   the [address] ai algorithm. FINDINGS:       Delayed contrast-enhanced head CT:       The ventricles are midline without hydrocephalus. There is no acute intra or   extra-axial hemorrhage. The basal cisterns are clear. The paranasal sinuses are   clear. CTA NECK:       Great vessels: Normal arch anatomy with the origins patent. Right subclavian artery: Patent       Left subclavian artery: Patent        Right common carotid artery: Patent        Left common carotid artery: Patent        Cervical right internal carotid artery: Patent with no significant stenosis by   NASCET criteria. Cervical left internal carotid artery: Patent with no significant stenosis by   NASCET criteria. Right vertebral artery: Patent       Left vertebral artery: Nondominant, patent       The lung apices are clear. The thyroid is homogeneous. No cervical   lymphadenopathy. CTA HEAD:       Right cavernous internal carotid artery: Patent       Left cavernous internal carotid artery: Patent       Anterior cerebral arteries: Patent       Anterior communicating artery: Patent       Right middle cerebral artery: Patent       Left middle cerebral artery: Patent       Posterior communicating arteries: Patent       Posterior cerebral arteries: Patent       Basilar artery: Patent       Distal vertebral arteries: Patent       No evidence for intracranial aneurysm or hemodynamically significant stenosis. CT Perfusion:   Normal CT perfusion. 05/04/22 1359  CT CODE NEURO HEAD WO CONTRAST Final result    Impression:  No acute intracranial hemorrhage, mass or infarct. Narrative:  INDICATION: Code Stroke, numbness. Exam: Noncontrast CT of the brain is performed with 5 mm collimation.        CT dose reduction was achieved with the use of the standardized protocol   tailored for this examination and automatic exposure control for dose   modulation. Direct comparison is made to prior CT dated 1/2017. FINDINGS: There is no acute intracranial hemorrhage, mass, mass effect or   herniation. Ventricular system is normal. The gray-white matter differentiation   is well-preserved. The mastoid air cells are well pneumatized. The visualized   paranasal sinuses are normal.               CXR Results  (Last 48 hours)               05/04/22 1534  XR CHEST PORT Final result    Impression:  No acute cardiopulmonary disease. Narrative:  INDICATION: Numbness. Portable AP views of the chest.       Direct comparison made to prior chest x-ray dated July 2021       Cardiomediastinal silhouette is stable. Lungs are clear bilaterally. Pleural   spaces are normal and there is no pneumothorax. Osseous structures are intact. Medical Decision Making   I am the first provider for this patient. I reviewed the vital signs, available nursing notes, past medical history, past surgical history, family history and social history. Vital Signs-Reviewed the patient's vital signs. Patient Vitals for the past 12 hrs:   Temp Pulse Resp BP SpO2   05/04/22 1628 -- 68 12 132/79 96 %   05/04/22 1606 -- 74 17 131/80 93 %   05/04/22 1536 -- 77 17 125/75 93 %   05/04/22 1506 -- 89 18 120/86 96 %   05/04/22 1434 97.7 °F (36.5 °C) -- -- -- --   05/04/22 1413 -- -- 16 116/85 --       EKG interpretation: (Preliminary)  Sinus 1st degree AV block rate 76, normal axis/pr/qrs, no acute ST changes, Mascoutahcheri Gant DO      Records Reviewed: Nursing Notes, Old Medical Records, Ambulance Run Sheet, Previous Radiology Studies and Previous Laboratory Studies    Provider Notes (Medical Decision Making):   DDx- CVA, TIA, ICH, electrolyte abnormality, thyroid dysfunction      ED Course:   Initial assessment performed.  The patients presenting problems have been discussed, and they are in agreement with the care plan formulated and outlined with them. I have encouraged them to ask questions as they arise throughout their visit. Patient had been seen and evaluated immediately upon arrival to the emergency department. His symptoms began 3 hours and 45 minutes ago. There are some signs concerning for an acute stroke patient will be alerted as a level 1 code stroke. Telemetry neurology paged    Case discussed with Dr. Kathryn Crigler who is seen and evaluated the patient following his dry scan. Patient is at 4 hours and 15 minutes from onset of symptoms given his risk of bleeding from tPA nearly the same risk of benefit from the tPA patient declined administration. We will obtain the CTA with perfusion to assess for any other vessel occlusion. CTA not showing any large vessel occlusion. .    Neurology recommends full dose of aspirin as well as a Plavix load of 300 mg. This has been ordered. Consult note:  Case discussed with Dr. Elenita Carlos. Patient will be evaluated and admitted. Critical Care Time:   CRITICAL CARE NOTE :    5:25 PM  IMPENDING DETERIORATION -CNS  ASSOCIATED RISK FACTORS - CNS Decompensation  MANAGEMENT- Bedside Assessment and Supervision of Care  INTERPRETATION -  Xrays, CT Scan, ECG, Blood Pressure, Cardiac Output Measures  and labs  INTERVENTIONS - Neurologic interventions   CASE REVIEW - Hospitalist/Intensivist, Medical Sub-Specialist, Nursing TREATMENT RESPONSE -Stable  PERFORMED BY - Self    NOTES   :  I have spent 40 minutes of critical care time involved in lab review, consultations with specialist, family decision- making, bedside attention and documentation. This time excludes time spent in any separate billed procedures. During this entire length of time I was immediately available to the patient . Lee Long,       Disposition:  Admit      Diagnosis     Clinical Impression:   1.  Cerebrovascular accident (CVA), unspecified mechanism ([facility] [address]        Attestations:    Leemorgan Long, DO    Please note that this dictation was completed with [email or web address], the [email or web address] voice recognition software. Quite often unanticipated grammatical, syntax, homophones, and other interpretive errors are inadvertently transcribed by the computer software. Please disregard these errors. Please excuse any errors that have escaped final proofreading. Thank you.

## 2022-05-04 NOTE — H&P
Hospitalist Admission Note    NAME: Altaf Carias   :  1965   MRN:  [id number]     Date/Time:  2022 4:05 PM    Patient PCP: Juan Dsouza MD  ______________________________________________________________________  Given the patient's current clinical presentation, I have a high level of concern for decompensation if discharged from the emergency department. Complex decision making was performed, which includes reviewing the patient's available past medical records, laboratory results, and x-ray films. My assessment of this patient's clinical condition and my plan of care is as follows. Assessment / Plan:  Left sided numbness  Rule out CVA:    CT head and perfusion negative  Follow-up MRI  Stroke work-up  Received aspirin 325 and Plavix 300 in the ED  Continue with aspirin, statin  Follow-up echo  Follow-up HbA1c, lipid profile  PT OT consult, SLP eval    Substernal/neck pain  Describes as heartburn  EKG shows no acute ischemia, troponin less than 4  GI cocktail, continue with Protonix  Likely due to GERD    Depression  Anxiety  Denies any suicidal ideation or intent  Mentions feeling depressed  Psych consult  Continue with home medications    Hypertension  Permissive hypertension for 24 to 48 hours  Hold home meds    Code Status: Full code  Surrogate Decision Maker: His wife    DVT Prophylaxis: Lovenox  GI Prophylaxis: not indicated    Baseline: Ambulatory, independent,      Subjective:   CHIEF COMPLAINT: Left sided numbness    HISTORY OF PRESENT ILLNESS:     Altaf Carias is a 59 y.o. male with a family history of hypercholesterolemia, hypertension, depression, anxiety presented with left-sided numbness that started this morning. He started having left facial, left upper and lower extremity numbness that started this morning at 10 AM.  Currently his symptoms are around the same. He denies any weakness, visual changes, facial deviation.   He mentions having \" heartburn\" like symptoms, describes pain at the back of her upper substernal region. He denies any abdominal pain, change in bowel habits. He has mild lower extremity edema but does not have any tenderness. He denies any bowel or bladder habit changes. He mentions feeling depressed, however denies any suicidal ideation or intent. We were asked to admit for work up and evaluation of the above problems. Past Medical History:   Diagnosis Date    Cholelithiasis 8/22/2011    High cholesterol     Hypertension     Other ill-defined conditions(799.89)     hypercholesterolemia    Psychiatric disorder     anxiety    Seasonal allergies     Status post laparoscopic cholecystectomy 8/25/11        Past Surgical History:   Procedure Laterality Date    HX CHOLECYSTECTOMY      HX ORTHOPAEDIC      back srgery, arm surgery    [place] LAP,CHOLECYSTECTOMY  8/25/11       Social History     Tobacco Use    Smoking status: Former Smoker    Smokeless tobacco: Former User     Quit date: 1/1/2001   Substance Use Topics    Alcohol use: Yes     Alcohol/week: 28.0 standard drinks     Types: 28 Cans of beer per week        Family History   Problem Relation Age of Onset    Heart Disease Mother     Headache Mother     Heart Disease Maternal Grandfather     Heart Disease Paternal Grandmother     Headache Sister     Headache Brother      Allergies   Allergen Reactions    Codeine Nausea and Vomiting    Dilaudid [Hydromorphone (Bulk)] Nausea and Vomiting     Caused N&V when given in the hospital    Morphine Rash        Prior to Admission medications    Medication Sig Start Date End Date Taking? Authorizing Provider   clonazePAM (KlonoPIN) 1 mg tablet Take 1 mg by mouth two (2) times a day. Provider, Historical   ondansetron (ZOFRAN ODT) 4 mg disintegrating tablet Take 1 Tab by mouth every eight (8) hours as needed for Nausea.  6/24/19   Dandy Nogueira MD   naproxen (NAPROSYN) 500 mg tablet Take 1 Tab by mouth every twelve (12) hours as needed for Pain. 6/24/19   Danilo Cote MD   gabapentin (NEURONTIN) 300 mg capsule Take 300 mg by mouth three (3) times daily. Provider, Historical   eszopiclone (LUNESTA) 3 mg tablet Take 3 mg by mouth nightly. Provider, Historical   acetaminophen (TYLENOL) 500 mg tablet Take 500 mg by mouth nightly as needed for Pain. Indications: ARTHRITIC PAIN    Provider, Historical   lisinopril-hydrochlorothiazide (PRINZIDE, ZESTORETIC) 20-12.5 mg per tablet Take 1 Tab by mouth daily. 1 tablet daily for 30 days per prescription    Provider, Historical   ARIPiprazole (ABILIFY) 20 mg tablet Take 20 mg by mouth daily. Susanne Pritchard MD   meclizine (ANTIVERT) 25 mg tablet Take  by mouth three (3) times daily as needed. Susanne Pritchard MD   escitalopram (LEXAPRO) 20 mg tablet Take 40 mg by mouth daily. Susanne Pritchard MD   rosuvastatin (CRESTOR) 20 mg tablet Take 20 mg by mouth nightly. Susanne Pritchard MD   omeprazole (PRILOSEC) 20 mg capsule Take 20 mg by mouth daily. Susanne Pritchard MD   MULTI-VITAMIN PO Take  by mouth. Susanne Pritchard MD   omega-3 fatty acids-vitamin e (FISH OIL) 1,000 mg Cap Take 4 Caps by mouth daily.     Susanne Pritchard MD       REVIEW OF SYSTEMS:         Total of 12 systems reviewed as follows:       POSITIVE= underlined text  Negative = text not underlined  General:  fever, chills, sweats, generalized weakness, weight loss/gain,      loss of appetite   Eyes:    blurred vision, eye pain, loss of vision, double vision  ENT:    rhinorrhea, pharyngitis   Respiratory:   cough, sputum production, SOB, CARDENAS, wheezing, pleuritic pain   Cardiology:   chest pain, palpitations, orthopnea, PND, edema, syncope   Gastrointestinal:  abdominal pain , N/V, diarrhea, dysphagia, constipation, bleeding   Genitourinary:  frequency, urgency, dysuria, hematuria, incontinence   Muskuloskeletal :  arthralgia, myalgia, back pain  Hematology:  easy bruising, nose or gum bleeding, lymphadenopathy   Dermatological: rash, ulceration, pruritis, color change / jaundice  Endocrine:   hot flashes or polydipsia   Neurological:  headache, dizziness, confusion, focal weakness, paresthesia,     Speech difficulties, memory loss, gait difficulty  Psychological: Feelings of anxiety, depression, agitation    Objective:   VITALS:    Visit Vitals  /85   Temp 97.7 °F (36.5 °C)   Resp 16   Wt 117 kg (257 lb 15 oz)   BMI 35.47 kg/m²       PHYSICAL EXAM:    General:    Alert, cooperative, no distress, appears stated age. HEENT: Atraumatic, anicteric sclerae, pink conjunctivae     No oral ulcers, mucosa moist, throat clear, dentition fair  Neck:  Supple, symmetrical,  thyroid: non tender  Lungs:   Clear to auscultation bilaterally. No Wheezing or Rhonchi. No rales. Chest wall:  No tenderness  No Accessory muscle use. Heart:   Regular  rhythm,  No  murmur mild lower extremity edema  Abdomen:   Soft, non-tender. Not distended. Bowel sounds normal  Extremities: No cyanosis. No clubbing,      Skin turgor normal, Capillary refill normal, Radial dial pulse 2+  Skin:     Not pale. Not Jaundiced  No rashes   Psych:  Good insight. Not depressed. Not anxious or agitated. Neurologic: EOMs intact. No facial asymmetry. No aphasia or slurred speech. Symmetrical strength, Sensation grossly intact.  Alert and oriented X 4.     _______________________________________________________________________  Care Plan discussed with:    Comments   Patient y    Family      RN y    Care Manager                    Consultant:      _______________________________________________________________________  Expected  Disposition:   Home with Family y   HH/PT/OT/RN    SNF/LTC    [facility]    ________________________________________________________________________  TOTAL TIME:  40 Minutes    Critical Care Provided     Minutes non procedure based      Comments     Reviewed previous records   >50% of visit spent in counseling and coordination of care  Discussion with patient and/or family and questions answered       ________________________________________________________________________  Signed: Madeleine Dunham MD    Procedures: see electronic medical records for all procedures/Xrays and details which were not copied into this note but were reviewed prior to creation of Plan. LAB DATA REVIEWED:    Recent Results (from the past 24 hour(s))   GLUCOSE, POC    Collection Time: 05/04/22  2:06 PM   Result Value Ref Range    Glucose (POC) 109 65 - 117 mg/dL    Performed by Gabriel GREER    CBC WITH AUTOMATED DIFF    Collection Time: 05/04/22  2:12 PM   Result Value Ref Range    WBC 6.9 4.1 - 11.1 K/uL    RBC 4.48 4.10 - 5.70 M/uL    HGB 14.0 12.1 - 17.0 g/dL    HCT 39.3 36.6 - 50.3 %    MCV 87.7 80.0 - 99.0 FL    MCH 31.3 26.0 - 34.0 PG    MCHC 35.6 30.0 - 36.5 g/dL    RDW 13.3 11.5 - 14.5 %    PLATELET 984 (L) 453 - 400 K/uL    MPV 9.8 8.9 - 12.9 FL    NRBC 0.0 0  WBC    ABSOLUTE NRBC 0.00 0.00 - 0.01 K/uL    NEUTROPHILS 50 32 - 75 %    LYMPHOCYTES 36 12 - 49 %    MONOCYTES 10 5 - 13 %    EOSINOPHILS 2 0 - 7 %    BASOPHILS 1 0 - 1 %    IMMATURE GRANULOCYTES 1 (H) 0.0 - 0.5 %    ABS. NEUTROPHILS 3.5 1.8 - 8.0 K/UL    ABS. LYMPHOCYTES 2.5 0.8 - 3.5 K/UL    ABS. MONOCYTES 0.7 0.0 - 1.0 K/UL    ABS. EOSINOPHILS 0.1 0.0 - 0.4 K/UL    ABS. BASOPHILS 0.0 0.0 - 0.1 K/UL    ABS. IMM.  GRANS. 0.0 0.00 - 0.04 K/UL    DF AUTOMATED     PROTHROMBIN TIME + INR    Collection Time: 05/04/22  2:12 PM   Result Value Ref Range    INR 1.0 0.9 - 1.1      Prothrombin time 10.5 9.0 - 11.1 sec   TROPONIN-HIGH SENSITIVITY    Collection Time: 05/04/22  2:12 PM   Result Value Ref Range    Troponin-High Sensitivity <4 0 - 76 ng/L   MAGNESIUM    Collection Time: 05/04/22  2:12 PM   Result Value Ref Range    Magnesium 2.2 1.6 - 2.4 mg/dL   TSH 3RD GENERATION    Collection Time: 05/04/22  2:12 PM   Result Value Ref Range    TSH 0.86 0.36 - 7.16 uIU/mL   METABOLIC PANEL, COMPREHENSIVE    Collection Time: 05/04/22  2:12 PM   Result Value Ref Range    Sodium 133 (L) 136 - 145 mmol/L    Potassium 4.0 3.5 - 5.1 mmol/L    Chloride 102 97 - 108 mmol/L    CO2 29 21 - 32 mmol/L    Anion gap 2 (L) 5 - 15 mmol/L    Glucose 114 (H) 65 - 100 mg/dL    BUN 13 6 - 20 MG/DL    Creatinine 1.20 0.70 - 1.30 MG/DL    BUN/Creatinine ratio 11 (L) 12 - 20      GFR est AA >60 >60 ml/min/1.73m2    GFR est non-AA >60 >60 ml/min/1.73m2    Calcium 8.6 8.5 - 10.1 MG/DL    Bilirubin, total 0.5 0.2 - 1.0 MG/DL    ALT (SGPT) 40 12 - 78 U/L    AST (SGOT) 24 15 - 37 U/L    Alk.  phosphatase 93 45 - 117 U/L    Protein, total 6.3 (L) 6.4 - 8.2 g/dL    Albumin 3.3 (L) 3.5 - 5.0 g/dL    Globulin 3.0 2.0 - 4.0 g/dL    A-G Ratio 1.1 1.1 - 2.2     EKG, 12 LEAD, INITIAL    Collection Time: 05/04/22  2:46 PM   Result Value Ref Range    Ventricular Rate 76 BPM    Atrial Rate 76 BPM    P-R Interval 230 ms    QRS Duration 90 ms    Q-T Interval 402 ms    QTC Calculation (Bezet) 452 ms    Calculated P Axis 68 degrees    Calculated R Axis 68 degrees    Calculated T Axis 70 degrees    Diagnosis       Sinus rhythm with 1st degree AV block  When compared with ECG of 10-JUL-2021 18:55,  No significant change was found

## 2022-05-04 NOTE — PROGRESS NOTES
TRANSFER - IN REPORT:    Verbal report received from MATHEW Lopez(name) on Darroll Josh  being received from ED(unit) for routine progression of care      Report consisted of patients Situation, Background, Assessment and   Recommendations(SBAR). Information from the following report(s) SBAR and ED Summary was reviewed with the receiving nurse. Opportunity for questions and clarification was provided.       Tramaine Steve RN

## 2022-05-05 ENCOUNTER — APPOINTMENT (OUTPATIENT)
Dept: VASCULAR SURGERY | Age: 57
DRG: 069 | End: 2022-05-05
Attending: PSYCHIATRY & NEUROLOGY
Payer: MEDICARE

## 2022-05-05 ENCOUNTER — APPOINTMENT (OUTPATIENT)
Dept: NON INVASIVE DIAGNOSTICS | Age: 57
DRG: 069 | End: 2022-05-05
Attending: STUDENT IN AN ORGANIZED HEALTH CARE EDUCATION/TRAINING PROGRAM
Payer: MEDICARE

## 2022-05-05 PROBLEM — R20.0 LEFT SIDED NUMBNESS: Status: ACTIVE | Noted: 2022-05-05

## 2022-05-05 PROBLEM — I65.23 BILATERAL CAROTID ARTERY STENOSIS: Status: ACTIVE | Noted: 2022-05-05

## 2022-05-05 PROBLEM — G45.1 TRANSIENT ISCHEMIC ATTACK INVOLVING RIGHT INTERNAL CAROTID ARTERY: Status: ACTIVE | Noted: 2022-05-05

## 2022-05-05 LAB
ANION GAP SERPL CALC-SCNC: 5 MMOL/L (ref 5–15)
BUN SERPL-MCNC: 13 MG/DL (ref 6–20)
BUN/CREAT SERPL: 13 (ref 12–20)
CALCIUM SERPL-MCNC: 8.5 MG/DL (ref 8.5–10.1)
CHLORIDE SERPL-SCNC: 111 MMOL/L (ref 97–108)
CHOLEST SERPL-MCNC: 77 MG/DL
CO2 SERPL-SCNC: 22 MMOL/L (ref 21–32)
COMMENT, HOLDF: NORMAL
CREAT SERPL-MCNC: 1.03 MG/DL (ref 0.7–1.3)
ERYTHROCYTE [DISTWIDTH] IN BLOOD BY AUTOMATED COUNT: 13.3 % (ref 11.5–14.5)
EST. AVERAGE GLUCOSE BLD GHB EST-MCNC: 103 MG/DL
GLUCOSE SERPL-MCNC: 95 MG/DL (ref 65–100)
HBA1C MFR BLD: 5.2 % (ref 4–5.6)
HCT VFR BLD AUTO: 41.4 % (ref 36.6–50.3)
HDLC SERPL-MCNC: 20 MG/DL
HDLC SERPL: 3.9 {RATIO} (ref 0–5)
HGB BLD-MCNC: 14.1 G/DL (ref 12.1–17)
LDLC SERPL CALC-MCNC: 46.6 MG/DL (ref 0–100)
LEFT CCA DIST DIAS: 23.9 CM/S
LEFT CCA DIST SYS: 102.9 CM/S
LEFT CCA PROX DIAS: 23.9 CM/S
LEFT CCA PROX SYS: 109.5 CM/S
LEFT ECA DIAS: 7.6 CM/S
LEFT ECA SYS: 64.7 CM/S
LEFT ICA DIST DIAS: 26.3 CM/S
LEFT ICA DIST SYS: 70.2 CM/S
LEFT ICA MID DIAS: 14.2 CM/S
LEFT ICA MID SYS: 38.3 CM/S
LEFT ICA PROX DIAS: 12 CM/S
LEFT ICA PROX SYS: 37.2 CM/S
LEFT ICA/CCA SYS: 0.7 NO UNITS
LEFT VERTEBRAL DIAS: 9.8 CM/S
LEFT VERTEBRAL SYS: 59.2 CM/S
MCH RBC QN AUTO: 31 PG (ref 26–34)
MCHC RBC AUTO-ENTMCNC: 34.1 G/DL (ref 30–36.5)
MCV RBC AUTO: 91 FL (ref 80–99)
NRBC # BLD: 0 K/UL (ref 0–0.01)
NRBC BLD-RTO: 0 PER 100 WBC
PLATELET # BLD AUTO: 156 K/UL (ref 150–400)
PMV BLD AUTO: 10 FL (ref 8.9–12.9)
POTASSIUM SERPL-SCNC: 4.5 MMOL/L (ref 3.5–5.1)
RBC # BLD AUTO: 4.55 M/UL (ref 4.1–5.7)
RIGHT CCA DIST DIAS: 20.7 CM/S
RIGHT CCA DIST SYS: 83.3 CM/S
RIGHT CCA PROX DIAS: 16.8 CM/S
RIGHT CCA PROX SYS: 96.4 CM/S
RIGHT ECA DIAS: 9 CM/S
RIGHT ECA SYS: 76.8 CM/S
RIGHT ICA DIST DIAS: 23.3 CM/S
RIGHT ICA DIST SYS: 61.2 CM/S
RIGHT ICA MID DIAS: 15.5 CM/S
RIGHT ICA MID SYS: 40.3 CM/S
RIGHT ICA PROX DIAS: 11.6 CM/S
RIGHT ICA PROX SYS: 48.1 CM/S
RIGHT ICA/CCA SYS: 0.7 NO UNITS
RIGHT VERTEBRAL DIAS: 12.9 CM/S
RIGHT VERTEBRAL SYS: 45.5 CM/S
SAMPLES BEING HELD,HOLD: NORMAL
SODIUM SERPL-SCNC: 138 MMOL/L (ref 136–145)
TRIGL SERPL-MCNC: 52 MG/DL (ref ?–150)
VAS LEFT SUBCLAVIAN PROX EDV: 0 CM/S
VAS LEFT SUBCLAVIAN PROX PSV: 153.4 CM/S
VAS RIGHT SUBCLAVIAN PROX EDV: 0 CM/S
VAS RIGHT SUBCLAVIAN PROX PSV: 93.8 CM/S
VLDLC SERPL CALC-MCNC: 10.4 MG/DL
WBC # BLD AUTO: 5.1 K/UL (ref 4.1–11.1)

## 2022-05-05 PROCEDURE — 93880 EXTRACRANIAL BILAT STUDY: CPT

## 2022-05-05 PROCEDURE — 97535 SELF CARE MNGMENT TRAINING: CPT

## 2022-05-05 PROCEDURE — 97165 OT EVAL LOW COMPLEX 30 MIN: CPT

## 2022-05-05 PROCEDURE — 65270000046 HC RM TELEMETRY

## 2022-05-05 PROCEDURE — 97530 THERAPEUTIC ACTIVITIES: CPT

## 2022-05-05 PROCEDURE — 36415 COLL VENOUS BLD VENIPUNCTURE: CPT

## 2022-05-05 PROCEDURE — 97161 PT EVAL LOW COMPLEX 20 MIN: CPT

## 2022-05-05 PROCEDURE — 74011250636 HC RX REV CODE- 250/636: Performed by: STUDENT IN AN ORGANIZED HEALTH CARE EDUCATION/TRAINING PROGRAM

## 2022-05-05 PROCEDURE — 99223 1ST HOSP IP/OBS HIGH 75: CPT | Performed by: PSYCHIATRY & NEUROLOGY

## 2022-05-05 PROCEDURE — 80061 LIPID PANEL: CPT

## 2022-05-05 PROCEDURE — 96374 THER/PROPH/DIAG INJ IV PUSH: CPT

## 2022-05-05 PROCEDURE — 83036 HEMOGLOBIN GLYCOSYLATED A1C: CPT

## 2022-05-05 PROCEDURE — 74011250637 HC RX REV CODE- 250/637: Performed by: STUDENT IN AN ORGANIZED HEALTH CARE EDUCATION/TRAINING PROGRAM

## 2022-05-05 PROCEDURE — 80048 BASIC METABOLIC PNL TOTAL CA: CPT

## 2022-05-05 PROCEDURE — 85027 COMPLETE CBC AUTOMATED: CPT

## 2022-05-05 PROCEDURE — 93880 EXTRACRANIAL BILAT STUDY: CPT | Performed by: PSYCHIATRY & NEUROLOGY

## 2022-05-05 PROCEDURE — 74011250637 HC RX REV CODE- 250/637: Performed by: INTERNAL MEDICINE

## 2022-05-05 RX ORDER — LISINOPRIL 20 MG/1
20 TABLET ORAL DAILY
Status: DISCONTINUED | OUTPATIENT
Start: 2022-05-05 | End: 2022-05-06 | Stop reason: HOSPADM

## 2022-05-05 RX ORDER — HYDROCHLOROTHIAZIDE 25 MG/1
12.5 TABLET ORAL DAILY
Status: DISCONTINUED | OUTPATIENT
Start: 2022-05-05 | End: 2022-05-06 | Stop reason: HOSPADM

## 2022-05-05 RX ADMIN — ENOXAPARIN SODIUM 30 MG: 100 INJECTION SUBCUTANEOUS at 08:27

## 2022-05-05 RX ADMIN — PANTOPRAZOLE SODIUM 40 MG: 40 TABLET, DELAYED RELEASE ORAL at 08:27

## 2022-05-05 RX ADMIN — CLONAZEPAM 1 MG: 0.5 TABLET ORAL at 08:27

## 2022-05-05 RX ADMIN — GABAPENTIN 300 MG: 300 CAPSULE ORAL at 17:12

## 2022-05-05 RX ADMIN — GABAPENTIN 300 MG: 300 CAPSULE ORAL at 21:28

## 2022-05-05 RX ADMIN — ESCITALOPRAM OXALATE 40 MG: 10 TABLET ORAL at 08:27

## 2022-05-05 RX ADMIN — LISINOPRIL 20 MG: 20 TABLET ORAL at 11:56

## 2022-05-05 RX ADMIN — ESZOPICLONE 3 MG: 2 TABLET, FILM COATED ORAL at 21:27

## 2022-05-05 RX ADMIN — GABAPENTIN 300 MG: 300 CAPSULE ORAL at 08:27

## 2022-05-05 RX ADMIN — ENOXAPARIN SODIUM 30 MG: 100 INJECTION SUBCUTANEOUS at 21:27

## 2022-05-05 RX ADMIN — ATORVASTATIN CALCIUM 40 MG: 40 TABLET, FILM COATED ORAL at 21:27

## 2022-05-05 RX ADMIN — ASPIRIN 81 MG: 81 TABLET, CHEWABLE ORAL at 08:27

## 2022-05-05 RX ADMIN — CLONAZEPAM 1 MG: 0.5 TABLET ORAL at 17:12

## 2022-05-05 RX ADMIN — HYDROCHLOROTHIAZIDE 12.5 MG: 25 TABLET ORAL at 11:56

## 2022-05-05 RX ADMIN — ARIPIPRAZOLE 20 MG: 5 TABLET ORAL at 08:31

## 2022-05-05 NOTE — PROGRESS NOTES
Hospitalist Progress Note    NAME: Arnol Holden   :  1965   MRN:  [id number]       Assessment / Plan:  TIA  MRI head neg for acute CVA  Negative work-up for any significant vascular obstructive disease with a CTA of the head neck is unremarkable and carotid Dopplers are unremarkable  TSH wnl, A1C 5.2, LDL 46  Echo is pending  Cont' ASA and statin  Neurology evaluated, appreciate recs  Substernal/neck pain  Describes as heartburn  EKG shows no acute ischemia, troponin less than 4  GI cocktail, continue with Protonix  Likely due to GERD     Depression  Anxiety  Denies any suicidal ideation or intent  Mentions feeling depressed  Psych consult  Continue with home medications     Hypertension  Resume acei/hctz         Code Status: Full code  Surrogate Decision Maker: His wife   DVT Prophylaxis: Lovenox  GI Prophylaxis: not indicated     Baseline: Ambulatory, independent,     Subjective:     Chief Complaint / Reason for Physician Visit  NAD. Discussed with RN events overnight. Review of Systems:  Symptom Y/N Comments  Symptom Y/N Comments   Fever/Chills    Chest Pain     Poor Appetite    Edema     Cough    Abdominal Pain     Sputum    Joint Pain     SOB/CARDENAS    Pruritis/Rash     Nausea/vomit    Tolerating PT/OT     Diarrhea    Tolerating Diet     Constipation    Other       Could NOT obtain due to:      Objective:     VITALS:   Last 24hrs VS reviewed since prior progress note.  Most recent are:  Patient Vitals for the past 24 hrs:   Temp Pulse Resp BP SpO2   22 1039 97.5 °F (36.4 °C) 71 18 (!) 146/81 95 %   22 0754 97.2 °F (36.2 °C) 81 18 132/74 98 %   22 0310 97.4 °F (36.3 °C) 62 18 135/79 97 %   22 2233 97.6 °F (36.4 °C) 63 18 107/67 95 %   22 2009 97.7 °F (36.5 °C) 68 18 (!) 99/58 96 %   22 1858 97.8 °F (36.6 °C) 80 18 116/68 94 %   22 1628 -- 68 12 132/79 96 %   22 1606 -- 74 17 131/80 93 %   22 1536 -- 77 17 125/75 93 %   22 1506 -- 89 18 120/86 96 %   05/04/22 1434 97.7 °F (36.5 °C) -- -- -- --   05/04/22 1413 -- -- 16 116/85 --       Intake/Output Summary (Last 24 hours) at 5/5/2022 1045  Last data filed at [address]  Gross per 24 hour   Intake --   Output 1015 ml   Net -1015 ml        I had a face to face encounter and independently examined this patient on 5/5/2022, as outlined below:  PHYSICAL EXAM:  General: WD, WN. Alert, cooperative, no acute distress    EENT:  EOMI. Anicteric sclerae. MMM  Resp:  CTA bilaterally, no wheezing or rales. No accessory muscle use  CV:  Regular  rhythm,  No edema  GI:  Soft, Non distended, Non tender. +Bowel sounds  Neurologic:  Alert and oriented X 3, normal speech,   Psych:   Depressed, denies SI/HI  Skin:  No rashes. No jaundice    Reviewed most current lab test results and cultures  YES  Reviewed most current radiology test results   YES  Review and summation of old records today    NO  Reviewed patient's current orders and MAR    YES  PMH/SH reviewed - no change compared to H&P  ________________________________________________________________________  Care Plan discussed with:    Comments   Patient x    Family      RN x    Care Manager     Consultant                        Multidiciplinary team rounds were held today with , nursing, pharmacist and clinical coordinator. Patient's plan of care was discussed; medications were reviewed and discharge planning was addressed. ________________________________________________________________________  Total NON critical care TIME: 35   Minutes    Total CRITICAL CARE TIME Spent:   Minutes non procedure based      Comments   >50% of visit spent in counseling and coordination of care     ________________________________________________________________________  Prakash Frye MD     Procedures: see electronic medical records for all procedures/Xrays and details which were not copied into this note but were reviewed prior to creation of Plan.       LABS:  I reviewed today's most current labs and imaging studies.   Pertinent labs include:  Recent Labs     05/05/22  0148 05/04/22  1412   WBC 5.1 6.9   HGB 14.1 14.0   HCT 41.4 39.3    149*     Recent Labs     05/05/22  0148 05/04/22  1412    133*   K 4.5 4.0   * 102   CO2 22 29   GLU 95 114*   BUN 13 13   CREA 1.03 1.20   CA 8.5 8.6   MG  --  2.2   ALB  --  3.3*   TBILI  --  0.5   ALT  --  40   INR  --  1.0       Signed: Maria Ness MD

## 2022-05-05 NOTE — PROGRESS NOTES
PHYSICAL THERAPY EVALUATION/DISCHARGE  Patient: Jaren Solis (60 y.o. male)  Date: 5/5/2022  Primary Diagnosis: CVA (cerebral vascular accident) [facility]) [I63.9]  Paresthesia [R20.2]       Precautions: Fall         ASSESSMENT  Based on the objective data described below, the patient presents from home with sudden onset of L face, arm, and leg numbness. CT, CTA, and MRI all negative for acute CVA. Pt received in supine, agreeable to PT session. Pt reporting symptoms now resolved and he feels at his baseline. Pt demonstrated all bed mobility and transfers Independent without assist. Gait performed up to 150 ft in room/ hallway without device, independent. No loss of balance noted with gait or functional mobility. Pt reporting he feels he is near his functional level and would like to resume OP PT for a current L shounder injury. Pt cleared up ad sheree in room, RN made aware. He does not require further acute care PT services but recommend continuing OP PT. Will sign off. Functional Outcome Measure: The patient scored 100/100 on the Barthel Index outcome measure which is indicative of 100% independence in mobility and ADLs. Other factors to consider for discharge: lives with wife in 1 story home. Further skilled acute physical therapy is not indicated at this time. PLAN :  Recommendation for discharge: (in order for the patient to meet his/her long term goals)  Outpatient physical therapy follow up recommended for L shoulder injury. No new needs. This discharge recommendation:  Has been made in collaboration with the attending provider and/or case management    IF patient discharges home will need the following DME: none       SUBJECTIVE:   Patient stated I feel a lot better.     OBJECTIVE DATA SUMMARY:   HISTORY:    Past Medical History:   Diagnosis Date    Cholelithiasis 8/22/2011    High cholesterol     Hypertension     Other ill-defined conditions(799.89)     hypercholesterolemia    Psychiatric disorder     anxiety    Seasonal allergies     Status post laparoscopic cholecystectomy 11     Past Surgical History:   Procedure Laterality Date    HX CHOLECYSTECTOMY      HX ORTHOPAEDIC      back srgery, arm surgery    [place] LAP,CHOLECYSTECTOMY  11       Prior level of function: independent household and short community level distances  Personal factors and/or comorbidities impacting plan of care: old L shoulder injury that pt is currently attending OP PT to address    Home Situation  Home Environment: Private residence  One/Two Story Residence: One story  Living Alone: No  Support Systems: Spouse/Significant Other,Other Family Member(s)  Patient Expects to be Discharged to[de-identified] Home  Current DME Used/Available at Home: None    EXAMINATION/PRESENTATION/DECISION MAKING:   Critical Behavior:  Neurologic State: Alert  Orientation Level: Appropriate for age  Cognition: Appropriate decision making  Safety/Judgement: Awareness of environment  Hearing: Auditory  Auditory Impairment: None  Skin:  Appears intact  Edema: none noted  Range Of Motion:  AROM: Generally decreased, functional           PROM: Generally decreased, functional           Strength:    Strength:  Within functional limits      Functional Mobility:  Bed Mobility:  Rolling: Independent  Supine to Sit: Independent     Scooting: Independent  Transfers:  Sit to Stand: Independent  Stand to Sit: Independent        Bed to Chair: Independent              Balance:   Sitting: Intact  Standing: Intact  Ambulation/Gait Training:  Distance (ft): 150 Feet (ft)  Assistive Device: Other (comment) (none)  Ambulation - Level of Assistance: Independent        Gait Abnormalities: Antalgic;Decreased step clearance        Base of Support: Widened     Speed/Norah: Slow  Step Length: Left shortened;Right shortened      Functional Measure:  Barthel Index:    Bathin  Bladder: 10  Bowels: 10  Groomin  Dressing: 10  Feeding: 10  Mobility: 15  Stairs: 10  Toilet Use: 10  Transfer (Bed to Chair and Back): 15  Total: 100/100       The Barthel ADL Index: Guidelines  1. The index should be used as a record of what a patient does, not as a record of what a patient could do. 2. The main aim is to establish degree of independence from any help, physical or verbal, however minor and for whatever reason. 3. The need for supervision renders the patient not independent. 4. A patient's performance should be established using the best available evidence. Asking the patient, friends/relatives and nurses are the usual sources, but direct observation and common sense are also important. However direct testing is not needed. 5. Usually the patient's performance over the preceding 24-48 hours is important, but occasionally longer periods will be relevant. 6. Middle categories imply that the patient supplies over 50 per cent of the effort. 7. Use of aids to be independent is allowed. Score Interpretation (from [address] [address])    Independent   60-79 Minimally independent   40-59 Partially dependent   20-39 Very dependent   <20 Totally dependent     -Anastacio Bar., Barthel, D.W. (1965). Functional evaluation: the Barthel Index. [address] [facility] ([address] (1997). The Barthel activities of daily living index: self-reporting versus actual performance in the old (> or = 75 years). Journal of [address] [place] [address] 45(7), [facility], CESAR, Brittany Marquez., Dannial Bath. (1999). Measuring the change in disability after inpatient rehabilitation; comparison of the responsiveness of the Barthel Index and Functional [place] Measure. Journal of Neurology, Neurosurgery, and Psychiatry, 66(4), 487-380. Geovany Bocanegra, N.J.A, JON Arroyo, & Pooja Lacey, MDonaA. (2004) Assessment of post-stroke quality of life in cost-effectiveness studies: The usefulness of the Barthel Index and the EuroQoL-5D.  [facility], 13, 685-86 Physical Therapy Evaluation Charge Determination   History Examination Presentation Decision-Making   LOW Complexity : Zero comorbidities / personal factors that will impact the outcome / POC LOW Complexity : 1-2 Standardized tests and measures addressing body structure, function, activity limitation and / or participation in recreation  LOW Complexity : Stable, uncomplicated  LOW Complexity : FOTO score of       Based on the above components, the patient evaluation is determined to be of the following complexity level: LOW     Pain Rating:  No pain reported    Activity Tolerance: WNL      After treatment patient left in no apparent distress:   Sitting in chair, Heels elevated for pressure relief and Call bell within reach    COMMUNICATION/EDUCATION:   The patients plan of care was discussed with: Occupational therapist and Registered nurse. Fall prevention education was provided and the patient/caregiver indicated understanding.     Thank you for this referral.  Maynor Tavarez, PT, DPT, NCS   Time Calculation: 16 mins

## 2022-05-05 NOTE — PROGRESS NOTES
End of Shift Note    Bedside shift change report given to xxx (oncoming nurse) by Ina Hirsch RN and [facility] RN (offgoing nurse). Report included the following information SBAR, Kardex, Intake/Output, MAR, Accordion and Cardiac Rhythm nsr    Shift worked:  night     Shift summary and any significant changes:     none     Concerns for physician to address: none     Zone phone for oncoming shift:   xxxx       Activity:  Activity Level: Up with Assistance  Number times ambulated in hallways past shift: 0  Number of times OOB to chair past shift: 0    Cardiac:   Cardiac Monitoring: Yes      Cardiac Rhythm: Sinus Rhythm    Access:   Current line(s): PIV     Genitourinary:   Urinary status: voiding    Respiratory:   O2 Device: None (Room air)  Chronic home O2 use?: N/A  Incentive spirometer at bedside: N/A       GI:  Last Bowel Movement Date: 05/03/22  Current diet:  ADULT DIET Regular; Low Fat/Low Chol/High Fiber/COURT  Passing flatus: YES  Tolerating current diet: YES       Pain Management:   Patient states pain is manageable on current regimen: YES    Skin:  Jeff Score: 20  Interventions: increase time out of bed    Patient Safety:  Fall Score:  Total Score: 1  Interventions: bed/chair alarm and gripper socks       Length of Stay:  Expected LOS: - - -  Actual LOS: 1      Ina Hirsch RN

## 2022-05-05 NOTE — CONSULTS
PSYCHIATRY CONSULT NOTE:    REASON FOR CONSULT: Depression    HISTORY OF PRESENTING COMPLAINT:  Shamar Charles is a 59 y.o. WHITE/NON- male who is currently admitted to the medical floor at [facility]. Mr. Sutton's current psychiatric medications include Abilify 20mg daily, Klonopin 1mg BID, Lexapro 40mg daily, and Gabapentin 300mg TID. Mr. Rita Guerrero states states he is feeling \"fine\" currently. He denies SI/plan/intent and HI/plan/intent. He finds his current medications helpful. PAST PSYCHIATRIC HISTORY:  Mr. Rita Guerrero has a history of depression and anxiety. He has been hospitalized twice at [facility] for inpatient psychiatry. Mr. Rita Guerrero sees Dr. Trey Denny for psychiatry, who is his PCP. No hx of suicide attempts. No hx of perceptual disturbances. SUBSTANCE ABUSE HISTORY:  Mr. Rita Guerrero has a history of alcohol use disorder, but he has been in recovery for 3 years. He takes antabuse for this. PSYCHOSOCIAL HISTORY:  Mr. Rita Guerrero is currently on disability. He is . Highest level of education completed is 12th grade. No legal hx. PAST MEDICAL HISTORY:  Please see H&P for details. Past Medical History:   Diagnosis Date    Cholelithiasis 8/22/2011    High cholesterol     Hypertension     Other ill-defined conditions(799.89)     hypercholesterolemia    Psychiatric disorder     anxiety    Seasonal allergies     Status post laparoscopic cholecystectomy 8/25/11     Prior to Admission medications    Medication Sig Start Date End Date Taking? Authorizing Provider   pantoprazole (Protonix) 40 mg tablet Take 40 mg by mouth daily. Yes Provider, Historical   clonazePAM (KlonoPIN) 1 mg tablet Take 1 mg by mouth two (2) times a day. Yes Provider, Historical   gabapentin (NEURONTIN) 300 mg capsule Take 300 mg by mouth three (3) times daily. Yes Provider, Historical   eszopiclone (LUNESTA) 3 mg tablet Take 3 mg by mouth nightly.    Yes Provider, Historical lisinopril-hydrochlorothiazide (PRINZIDE, ZESTORETIC) 20-12.5 mg per tablet Take 1 Tab by mouth daily. 1 tablet daily for 30 days per prescription   Yes Provider, Historical   ARIPiprazole (ABILIFY) 20 mg tablet Take 20 mg by mouth daily. Yes Other, MD Susanne   meclizine (ANTIVERT) 25 mg tablet Take  by mouth three (3) times daily as needed. Yes Other, MD Susanne   escitalopram (LEXAPRO) 20 mg tablet Take 40 mg by mouth daily. Yes Other, MD Susanne   rosuvastatin (CRESTOR) 20 mg tablet Take 20 mg by mouth nightly. Yes Other, MD Susanne   ondansetron (ZOFRAN ODT) 4 mg disintegrating tablet Take 1 Tab by mouth every eight (8) hours as needed for Nausea. Patient not taking: Reported on 5/4/2022 6/24/19   Lance Dorsey MD   naproxen (NAPROSYN) 500 mg tablet Take 1 Tab by mouth every twelve (12) hours as needed for Pain. Patient not taking: Reported on 5/4/2022 6/24/19   Lance Dorsey MD   acetaminophen (TYLENOL) 500 mg tablet Take 500 mg by mouth nightly as needed for Pain. Indications: ARTHRITIC PAIN    Provider, Historical   omeprazole (PRILOSEC) 20 mg capsule Take 20 mg by mouth daily. Patient not taking: Reported on 5/4/2022    Susanne Pritchard MD   MULTI-VITAMIN PO Take  by mouth. Patient not taking: Reported on 5/4/2022    Macho, MD Susanne   omega-3 fatty acids-vitamin e (FISH OIL) 1,000 mg Cap Take 4 Caps by mouth daily.   Patient not taking: Reported on 5/4/2022    Susanne Pritchard MD     Vitals:    05/04/22 2009 05/04/22 2233 05/05/22 0310 05/05/22 0754   BP: (!) 99/58 107/67 135/79 132/74   Pulse: 68 63 62 81   Resp: 18 18 18 18   Temp: 97.7 °F (36.5 °C) 97.6 °F (36.4 °C) 97.4 °F (36.3 °C) 97.2 °F (36.2 °C)   SpO2: 96% 95% 97% 98%   Weight:         Lab Results   Component Value Date/Time    WBC 5.1 05/05/2022 01:48 AM    Hemoglobin (POC) 14.3 03/11/2012 07:34 PM    HGB 14.1 05/05/2022 01:48 AM    Hematocrit (POC) 42 03/11/2012 07:34 PM    HCT 41.4 05/05/2022 01:48 AM    PLATELET 921 27/89/5991 01:48 AM    MCV 91.0 05/05/2022 01:48 AM     Lab Results   Component Value Date/Time    Sodium 138 05/05/2022 01:48 AM    Potassium 4.5 05/05/2022 01:48 AM    Chloride 111 (H) 05/05/2022 01:48 AM    CO2 22 05/05/2022 01:48 AM    Anion gap 5 05/05/2022 01:48 AM    Glucose 95 05/05/2022 01:48 AM    BUN 13 05/05/2022 01:48 AM    Creatinine 1.03 05/05/2022 01:48 AM    BUN/Creatinine ratio 13 05/05/2022 01:48 AM    GFR est AA >60 05/05/2022 01:48 AM    GFR est non-AA >60 05/05/2022 01:48 AM    Calcium 8.5 05/05/2022 01:48 AM    Bilirubin, total 0.5 05/04/2022 02:12 PM    Alk. phosphatase 93 05/04/2022 02:12 PM    Protein, total 6.3 (L) 05/04/2022 02:12 PM    Albumin 3.3 (L) 05/04/2022 02:12 PM    Globulin 3.0 05/04/2022 02:12 PM    A-G Ratio 1.1 05/04/2022 02:12 PM    ALT (SGPT) 40 05/04/2022 02:12 PM    AST (SGOT) 24 05/04/2022 02:12 PM     No results found for: VALF2, VALAC, VALP, VALPR, DS6, CRBAM, CRBAMP, CARB2, XCRBAM  No results found for: LITHM    MENTAL STATUS EXAM:  General appearance:  Well groomed, psychomotor activity is WNL  Eye contact: good  Speech: Normal rate, volume, rhythm   Affect: full  Mood: \"fine\"  Thought Process: Logical, goal directed  Perception: Denies AH or VH. Thought Content: denies SI/plan/intent, denies hi/plan/intent  Insight: Partial  Judgment: Fair  Cognition: Intact grossly. ASSESSMENT AND PLAN:  Damien Friend meets criteria for a diagnosis of MDD, recurrent, moderate; unspecified anxiety disorder, and alcohol use disorder, in remission.  -Mr. Vandana Campo does not meet criteria for inpatient psychiatry at this time. He has a follow-up with his PCP (who manages his psychiatric medications) tomorrow.     -Of note, Mr. Vandana Campo appears to have been ordered Lexapro 40mg daily, when the FDA approved max is 20mg We do often push SSRIs to supratherapeutic doses for OCD, but Mr. Vandana Campo does not have OCD.  His EKG from yesterday showed a QT interval of 402 so not prolonged, but Lexapro can contribute to arrhythmias in higher doses. However, Mr. Maude Rose stated his PCP is planning to change him to Prozac at their next meeting, so for now I would recommend just monitoring the EKG as is being done anyway and letting the outpatient PCP make the adjustments. Thank your your consult. Please feel free to consult us again as needed.

## 2022-05-05 NOTE — PROGRESS NOTES
Problem: Falls - Risk of  Goal: *Absence of Falls  Description: Document Franci Solis Fall Risk and appropriate interventions in the flowsheet.   Outcome: Progressing Towards Goal  Note: Fall Risk Interventions:            Medication Interventions: Bed/chair exit alarm                   Problem: Patient Education: Go to Patient Education Activity  Goal: Patient/Family Education  Outcome: Progressing Towards Goal     Problem: Patient Education: Go to Patient Education Activity  Goal: Patient/Family Education  Outcome: Progressing Towards Goal     Problem: TIA/CVA Stroke: 0-24 hours  Goal: Off Pathway (Use only if patient is Off Pathway)  Outcome: Progressing Towards Goal  Goal: Activity/Safety  Outcome: Progressing Towards Goal  Goal: Consults, if ordered  Outcome: Progressing Towards Goal  Goal: Diagnostic Test/Procedures  Outcome: Progressing Towards Goal  Goal: Nutrition/Diet  Outcome: Progressing Towards Goal  Goal: Discharge Planning  Outcome: Progressing Towards Goal  Goal: Medications  Outcome: Progressing Towards Goal  Goal: Respiratory  Outcome: Progressing Towards Goal  Goal: Treatments/Interventions/Procedures  Outcome: Progressing Towards Goal  Goal: Minimize risk of bleeding post-thrombolytic infusion  Outcome: Progressing Towards Goal  Goal: Monitor for complications post-thrombolytic infusion  Outcome: Progressing Towards Goal  Goal: Psychosocial  Outcome: Progressing Towards Goal  Goal: *Hemodynamically stable  Outcome: Progressing Towards Goal  Goal: *Neurologically stable  Description: Absence of additional neurological deficits    Outcome: Progressing Towards Goal  Goal: *Verbalizes anxiety and depression are reduced or absent  Outcome: Progressing Towards Goal  Goal: *Absence of Signs of Aspiration on Current Diet  Outcome: Progressing Towards Goal  Goal: *Absence of deep venous thrombosis signs and symptoms(Stroke Metric)  Outcome: Progressing Towards Goal  Goal: *Ability to perform ADLs and demonstrates progressive mobility and function  Outcome: Progressing Towards Goal  Goal: *Stroke education started(Stroke Metric)  Outcome: Progressing Towards Goal  Goal: *Dysphagia screen performed(Stroke Metric)  Outcome: Progressing Towards Goal  Goal: *Rehab consulted(Stroke Metric)  Outcome: Progressing Towards Goal     Problem: TIA/CVA Stroke: Day 2 Until Discharge  Goal: Off Pathway (Use only if patient is Off Pathway)  Outcome: Progressing Towards Goal  Goal: Activity/Safety  Outcome: Progressing Towards Goal  Goal: Diagnostic Test/Procedures  Outcome: Progressing Towards Goal  Goal: Nutrition/Diet  Outcome: Progressing Towards Goal  Goal: Discharge Planning  Outcome: Progressing Towards Goal  Goal: Medications  Outcome: Progressing Towards Goal  Goal: Respiratory  Outcome: Progressing Towards Goal  Goal: Treatments/Interventions/Procedures  Outcome: Progressing Towards Goal  Goal: Psychosocial  Outcome: Progressing Towards Goal  Goal: *Verbalizes anxiety and depression are reduced or absent  Outcome: Progressing Towards Goal  Goal: *Absence of aspiration  Outcome: Progressing Towards Goal  Goal: *Absence of deep venous thrombosis signs and symptoms(Stroke Metric)  Outcome: Progressing Towards Goal  Goal: *Optimal pain control at patient's stated goal  Outcome: Progressing Towards Goal  Goal: *Tolerating diet  Outcome: Progressing Towards Goal  Goal: *Ability to perform ADLs and demonstrates progressive mobility and function  Outcome: Progressing Towards Goal  Goal: *Stroke education continued(Stroke Metric)  Outcome: Progressing Towards Goal     Problem: Ischemic Stroke: Discharge Outcomes  Goal: *Verbalizes anxiety and depression are reduced or absent  Outcome: Progressing Towards Goal  Goal: *Verbalize understanding of risk factor modification(Stroke Metric)  Outcome: Progressing Towards Goal  Goal: *Hemodynamically stable  Outcome: Progressing Towards Goal  Goal: *Absence of aspiration pneumonia  Outcome: Progressing Towards Goal  Goal: *Aware of needed dietary changes  Outcome: Progressing Towards Goal  Goal: *Verbalize understanding of prescribed medications including anti-coagulants, anti-lipid, and/or anti-platelets(Stroke Metric)  Outcome: Progressing Towards Goal  Goal: *Tolerating diet  Outcome: Progressing Towards Goal  Goal: *Aware of follow-up diagnostics related to anticoagulants  Outcome: Progressing Towards Goal  Goal: *Ability to perform ADLs and demonstrates progressive mobility and function  Outcome: Progressing Towards Goal  Goal: *Absence of DVT(Stroke Metric)  Outcome: Progressing Towards Goal  Goal: *Absence of aspiration  Outcome: Progressing Towards Goal  Goal: *Optimal pain control at patient's stated goal  Outcome: Progressing Towards Goal  Goal: *Home safety concerns addressed  Outcome: Progressing Towards Goal  Goal: *Describes available resources and support systems  Outcome: Progressing Towards Goal  Goal: *Verbalizes understanding of activation of EMS(911) for stroke symptoms(Stroke Metric)  Outcome: Progressing Towards Goal  Goal: *Understands and describes signs and symptoms to report to providers(Stroke Metric)  Outcome: Progressing Towards Goal  Goal: *Neurolgocially stable (absence of additional neurological deficits)  Outcome: Progressing Towards Goal  Goal: *Verbalizes importance of follow-up with primary care physician(Stroke Metric)  Outcome: Progressing Towards Goal  Goal: *Smoking cessation discussed,if applicable(Stroke Metric)  Outcome: Progressing Towards Goal  Goal: *Depression screening completed(Stroke Metric)  Outcome: Progressing Towards Goal

## 2022-05-05 NOTE — PROGRESS NOTES
Spoke to RN to arrange consult several times this morning, waiting on telehealth computer to be located; if it is not possible to complete this consult this afternoon will plan to see pt over telehealth tomorrow morning.

## 2022-05-05 NOTE — PROGRESS NOTES
End of Shift Note    Bedside shift change report given to Mauricio Mahoney RN (oncoming nurse) by Issa Gusman RN (offgoing nurse). Report included the following information SBAR, ED Summary, MAR and Recent Results    Shift worked:  days   Shift summary and any significant changes:     Pt admitted from ED for CVA workup. MRI screening form sent and all CVA admission protocols followed. Concerns for physician to address:  none   Zone phone for oncoming shift:   [phone]     Patient Information  Shamar Charles  59 y.o.  5/4/2022  1:54 PM by Sorin Aburto MD. Shamar Charles was admitted from Home    Problem List  Patient Active Problem List    Diagnosis Date Noted    Paresthesia 05/04/2022    CVA [facility]cerebral vascular accident) ([facility] [address] 05/04/2022    Status post laparoscopic cholecystectomy     Abdominal pain 08/24/2011     Past Medical History:   Diagnosis Date    Cholelithiasis 8/22/2011    High cholesterol     Hypertension     Other ill-defined conditions(799.89)     hypercholesterolemia    Psychiatric disorder     anxiety    Seasonal allergies     Status post laparoscopic cholecystectomy 8/25/11       Core Measures:  CVA: Yes Yes  CHF:No No  PNA:No No    Activity:  Activity Level: Up with Assistance  Number times ambulated in hallways past shift: 0  Number of times OOB to chair past shift: 0    Cardiac:   Cardiac Monitoring: Yes      Cardiac Rhythm: Sinus Rhythm    Access:   Current line(s): PIV   Central Line? No Placement date  Reason Medically Necessary   PICC LINE? No Placement date Reason Medically Necessary     Genitourinary:   Urinary status: voiding  Urinary Catheter?  No Placement Date  Reason Medically Necessary     Respiratory:   O2 Device: None (Room air)  Chronic home O2 use?: NO  Incentive spirometer at bedside: NO       GI:  Last Bowel Movement Date: 05/03/22  Current diet:  ADULT DIET Regular; Low Fat/Low Chol/High Fiber/COURT  Passing flatus: YES  Tolerating current diet: YES       Pain Management: Patient states pain is manageable on current regimen: YES    Skin:  Jeff Score: 20  Interventions: increase time out of bed, PT/OT consult and nutritional support     Patient Safety:  Fall Score:  Total Score: 1  Interventions: bed/chair alarm, assistive device (walker, cane, etc), gripper socks, pt to call before getting OOB and stay with me (per policy)     @Rollbelt No  @dexterity to release roll belt  Yes/No ( must document dexterity  here by stating Yes or No here, otherwise this is a restraint and must follow restraint documentation policy.)    DVT prophylaxis:  DVT prophylaxis Med- Yes  DVT prophylaxis SCD or COOPER- No     Wounds: (If Applicable)  Wounds- No  Location     Active Consults:  IP CONSULT TO NEUROLOGY  IP CONSULT TO PSYCHIATRY    Length of Stay:  Expected LOS: - - -  Actual LOS: 0  Discharge Plan: Yes To home once medically cleared      Mo Bautista RN

## 2022-05-05 NOTE — PROGRESS NOTES
Speech Pathology Note    Chart reviewed. Patient currently CORBY at time of SLP visit. Will defer and follow up once patient returns to room. Thank you. Addendum 12:30:  Reviewed chart and note patient admitted with left sided numbness with concern for CVA. Note CT showed \"no acute intracranial hemorrhage, mass or infarct\" and MRI \"no acute infarct. \". Note patient passed the nursing dysphagia screen and a regular diet was ordered. NIHSS=3. Discussed case with RN who reported no SLP-related concerns. Introduced self and role of SLP to patient. Patient denied any decline in motor speech, language, cognitive, or swallowing function, and patient informally observed drinking thin liquids with no difficulty. Patient Ox4. Formal SLP evaluation not clinically indicated at this time. Will sign off. Please re-consult if further needs arise. Thank you.     Jonah Garcia M.S., CCC-SLP

## 2022-05-05 NOTE — PROGRESS NOTES
OCCUPATIONAL THERAPY EVALUATION/DISCHARGE  Patient: Huy Reyes (63 y.o. male)  Date: 5/5/2022  Primary Diagnosis: CVA (cerebral vascular accident) [facility]) [I63.9]  Paresthesia [R20.2]       Precautions:        ASSESSMENT  Based on the objective data described below, the patient presents close to his baseline of being independent. He was living at home with family and independent with ADLs and ILS and pt reports that he is having out pt therapy at this time for left shoulder due to torn tendon. Pt was stating that the numbness he had in left side of face, and left UE had resolved. Pt was able to yakov and doff socks, independent with functional transfers in room with no AD, able to  items from floor with no assist.  He was left sitting up in chair and at this time has no OT needs. Pt is up ad sheree in room     Current Level of Function (ADLs/self-care): setup in this setting only     Functional Outcome Measure: The patient scored Total A-D  Total A-D (Motor Function): 66/66 on the Fugl-Lopes Assessment which is indicative of no impairment in upper extremity functional status. PLAN :  Recommendation for discharge: (in order for the patient to meet his/her long term goals)  No skilled occupational therapy/ follow up rehabilitation needs identified at this time. This discharge recommendation:  Has been made in collaboration with the attending provider and/or case management    IF patient discharges home will need the following DME: none       SUBJECTIVE:   Patient stated I feel better now. I dont have any numbness.     OBJECTIVE DATA SUMMARY:   HISTORY:   Past Medical History:   Diagnosis Date    Cholelithiasis 8/22/2011    High cholesterol     Hypertension     Other ill-defined conditions(799.89)     hypercholesterolemia    Psychiatric disorder     anxiety    Seasonal allergies     Status post laparoscopic cholecystectomy 8/25/11     Past Surgical History:   Procedure Laterality Date    HX CHOLECYSTECTOMY      HX ORTHOPAEDIC      back srgery, arm surgery    [place] LAP,CHOLECYSTECTOMY  8/25/11       Prior Level of Function/Environment/Context: pt lives with family and independent with ADLs and ILS with no AD  Expanded or extensive additional review of patient history:     Home Situation  Home Environment: Private residence  One/Two Story Residence: One story  Living Alone: No  Support Systems: Spouse/Significant Other,Other Family Member(s)  Patient Expects to be Discharged to[de-identified] Home  Current DME Used/Available at Home: None    Hand dominance: Right    EXAMINATION OF PERFORMANCE DEFICITS:  Cognitive/Behavioral Status:  Neurologic State: Alert  Orientation Level: Appropriate for age  Cognition: Appropriate decision making  Perception: Appears intact  Perseveration: No perseveration noted  Safety/Judgement: Awareness of environment    Skin: in fair health     Edema: none     Hearing: Auditory  Auditory Impairment: None    Vision/Perceptual:                 Intact                     Range of Motion:    AROM: Generally decreased, functional  PROM: Generally decreased, functional                      Strength:    Strength: Within functional limits                Coordination:     Fine Motor Skills-Upper: Left Intact; Right Intact    Gross Motor Skills-Upper: Left Intact; Right Intact    Tone & Sensation:           intact in BUE                   Balance:  Sitting: Intact  Standing: Intact    Functional Mobility and Transfers for ADLs:  Bed Mobility:  Rolling: Independent  Supine to Sit: Independent  Scooting: Independent    Transfers:  Sit to Stand: Independent  Stand to Sit: Independent  Bed to Chair: Independent  Bathroom Mobility: Independent  Toilet Transfer : Independent    ADL Assessment:  Feeding: Independent    Oral Facial Hygiene/Grooming: Independent    Bathing: Independent    Upper Body Dressing: Independent    Lower Body Dressing: Independent    Toileting: Independent           Cognitive Retraining  Safety/Judgement: Awareness of environment       Functional Measure:  Fugl-Lopes Assessment of Motor Recovery after Stroke:   Reflex Activity  Flexors/Biceps/Fingers: Can be elicited  Extensors/Triceps: Can be elicited  Reflex Subtotal: 4    Volitional Movement Within Synergies  Shoulder Retraction: Full  Shoulder Elevation: Full  Shoulder Abduction (90 degrees): Full  Shoulder External Rotation: Full  Elbow Flexion: Full  Forearm Supination: Full  Shoulder Adduction/Internal Rotation: Full  Elbow Extension: Full  Forearm Pronation: Full  Subtotal: 18    Volitional Movement Mixing Synergies  Hand to Lumbar Spine: Full  Shoulder Flexion (0-90 degrees): Full  Pronation-Supination: Full  Subtotal: 6    Volitional Movement With Little or No Synergy  Shoulder Abduction (0-90 degrees): Full  Shoulder Flexion ( degrees): Full  Pronation/Supination: Full  Subtotal : 6    Normal Reflex Activity  Biceps, Triceps, Finger Flexors: Full  Subtotal : 2    Upper Extremity Total   Upper Extremity Total: 36    Wrist  Stability at 15 Degree Dorsiflexion: Full  Repeated Dorsiflexion/ Volar Flexion: Full  Stability at 15 Degree Dorsiflexion: Full  Repeated Dorsiflexion/ Volar Flexion: Full  Circumduction: Full  Wrist Total: 10    Hand  Mass Flexion: Full  Mass Extension: Full  Grasp A: Full  Grasp B: Full  Grasp C: Full  Grasp D: Full  Grasp E: Full  Hand Total: 14    Coordination/Speed  Tremor: None  Dysmetria: None  Time: <1s  Coordination/Speed Total : 6    Total A-D  Total A-D (Motor Function): 66/66     This is a reliable/valid measure of arm function after a neurological event. It has established value to characterize functional status and for measuring spontaneous and therapy-induced recovery; tests proximal and distal motor functions. Fugl-Lopes Assessment - UE scores recorded between five and 30 days post neurologic event can be used to predict UE recovery at six months post neurologic event.   Severe = 0-21 points   Moderately Severe = 22-33 points   Moderate = 34-47 points   Mild = 48-66 points  Jerry DYLAN Nguyen, ELVIS Sidhu, & SEBASTIÁN Mccabe (1992). Measurement of motor recovery after stroke: Outcome assessment and sample size requirements. Stroke, 23, pp. 2729-8801.   ------------------------------------------------------------------------------------------------------------------------------------------------------------------  MCID:  Stroke:   Tammie Harvey et al, 2001; n = 171; mean age 79 (6) years; assessed within 16 (12) days of stroke, Acute Stroke)  FMA Motor Scores from Admission to Discharge   10 point increase in FMA Upper Extremity = 1.5 change in discharge FIM   10 point increase in FMA Lower Extremity = 1.9 change in discharge FIM  MDC:   Stroke:   Lanette Freitas et al, 2008, n = 14, mean age = 59.9 (14.6) years, assessed on average 14 (6.5) months post stroke, Chronic Stroke)   FMA = 5.2 points for the Upper Extremity portion of the assessment     Occupational Therapy Evaluation Charge Determination   History Examination Decision-Making   LOW Complexity : Brief history review  LOW Complexity : 1-3 performance deficits relating to physical, cognitive , or psychosocial skils that result in activity limitations and / or participation restrictions  LOW Complexity : No comorbidities that affect functional and no verbal or physical assistance needed to complete eval tasks       Based on the above components, the patient evaluation is determined to be of the following complexity level: LOW   Pain Rating:  none    Activity Tolerance:   Good    After treatment patient left in no apparent distress:    Sitting in chair and Call bell within reach    COMMUNICATION/EDUCATION:   The patients plan of care was discussed with: Physical therapist and Registered nurse. Patient and/or family was verbally educated on the BE FAST acronym for signs/symptoms of CVA and TIA.  BE FAST was written on patient's communication board  for visual education and reinforcement. All questions answered with patient indicating good understanding.      Thank you for this referral.  Dandy Vanegas, OT  Time Calculation: 20 mins

## 2022-05-05 NOTE — CONSULTS
Consult  REFERRED BY:  Orlin Wray MD    CHIEF COMPLAINT: Numbness of the left side      Subjective:     Glenna Stanley is a 59 y.o. right-handed  male, seen as a new patient, at the request of the hospitalist, for evaluation of left-sided numbness that came on yesterday he was out in the yard planting, and suddenly noticed that his left arm felt numb, this apparently was left side of his face, and then went down the left lower extremity, but no weakness, this came on around 10 AM and lasted till he got to the emergency room. He had a CT of the head that was normal CTA of the head and neck that were normal and an MRI of the brain that was normal.  He was given aspirin and Plavix in the ER and is now on a aspirin therapy and a high-dose statin. His carotid Doppler studies just done do not show any significant stenosis. He does feel mildly depressed a lot of chronic stomach problems. He denies any chest pain or palpitations with this event yesterday. He has never had this happen before. He was not taking any antiplatelets prior to admission. He takes Abilify 20 mg every day for his nerves, and depression, and clonazepam 1 mg twice a day, Lexapro 20 mg a day, Lunesta at night for sleep, gabapentin 300 mg 3 times a day, and Antivert as needed for dizziness, and he takes Crestor for his cholesterol and Prilosec for stomach and Zofran for nausea and Protonix for stomach and meclizine for dizziness and Zestoretic for his blood pressure and Naprosyn for arthritis. Patient feels back to normal now and says he would like to go home. He has been depressed but denies any increased anxiety.     Past Medical History:   Diagnosis Date    Cholelithiasis 8/22/2011    High cholesterol     Hypertension     Other ill-defined conditions(799.89)     hypercholesterolemia    Psychiatric disorder     anxiety    Seasonal allergies     Status post laparoscopic cholecystectomy 8/25/11      Past Surgical History: Procedure Laterality Date    HX CHOLECYSTECTOMY      HX ORTHOPAEDIC      back srgery, arm surgery    ME LAP,CHOLECYSTECTOMY  8/25/11     Family History   Problem Relation Age of Onset    Heart Disease Mother     Headache Mother     Heart Disease Maternal Grandfather     Heart Disease Paternal Grandmother     Headache Sister     Headache Brother       Social History     Tobacco Use    Smoking status: Former Smoker    Smokeless tobacco: Former User     Quit date: 1/1/2001   Substance Use Topics    Alcohol use:  Yes     Alcohol/week: 28.0 standard drinks     Types: 28 Cans of beer per week         Current Facility-Administered Medications:     ARIPiprazole (ABILIFY) tablet 20 mg, 20 mg, Oral, DAILY, Jihan Almanzar MD, 20 mg at 05/05/22 0831    clonazePAM (KlonoPIN) tablet 1 mg, 1 mg, Oral, BID, Jihan Almanzar MD, 1 mg at 05/05/22 0827    escitalopram oxalate (LEXAPRO) tablet 40 mg, 40 mg, Oral, DAILY, Jihan Almanzar MD, 40 mg at 05/05/22 0827    eszopiclone (LUNESTA) tablet 3 mg, 3 mg, Oral, QHS, Jihan Almanzar MD, 3 mg at 05/04/22 2209    gabapentin (NEURONTIN) capsule 300 mg, 300 mg, Oral, TID, Jihan Almanzar MD, 300 mg at 05/05/22 0827    meclizine (ANTIVERT) tablet 25 mg, 25 mg, Oral, Q6H PRN, Jihan Almanzar MD    pantoprazole (PROTONIX) tablet 40 mg, 40 mg, Oral, ACB, Jihan Almanzar MD, 40 mg at 05/05/22 0827    acetaminophen (TYLENOL) tablet 650 mg, 650 mg, Oral, Q4H PRN **OR** acetaminophen (TYLENOL) solution 650 mg, 650 mg, Per NG tube, Q4H PRN **OR** acetaminophen (TYLENOL) suppository 650 mg, 650 mg, Rectal, Q4H PRN, Jihan Almanzar MD    aspirin chewable tablet 81 mg, 81 mg, Oral, DAILY, Jihan Almanzar MD, 81 mg at 05/05/22 0827    enoxaparin (LOVENOX) injection 30 mg, 30 mg, SubCUTAneous, Q12H, Jihan Almanzar MD, 30 mg at 05/05/22 0827    atorvastatin (LIPITOR) tablet 40 mg, 40 mg, Oral, QHS, Jihan Almanzar MD, 40 mg at 05/04/22 2206        Allergies   Allergen Reactions    Codeine Nausea and Vomiting    Dilaudid [Hydromorphone (Bulk)] Nausea and Vomiting     Caused N&V when given in the hospital    Morphine Rash      MRI Results (most recent):  Results from Hospital Encounter encounter on 05/04/22    MRI BRAIN WO CONT    Narrative  EXAM: MRI BRAIN WO CONT    INDICATION: Left extremity numbness. COMPARISON: CT angiography head earlier today. Rometta Favre CONTRAST: None. TECHNIQUE:  Multiplanar multisequence acquisition without contrast of the brain. FINDINGS:  Mild cerebral volume loss. No hydrocephalus. There is no acute infarct,  hemorrhage, extra-axial fluid collection, or mass effect. Mild chronic  microvascular ischemic disease in the bilateral frontal and parietal white  matter. Expected arterial flow-voids are present. Sagittal midline structures are within normal limits. No gradient susceptibility  artifact. Medial temporal lobes are symmetric. Impression  Cerebral volume loss and mild chronic microvascular ischemic disease. No acute  infarct. Results from [place] encounter on 05/04/22    MRI BRAIN WO CONT    Narrative  EXAM: MRI BRAIN WO CONT    INDICATION: Left extremity numbness. COMPARISON: CT angiography head earlier today. Rometta Favre CONTRAST: None. TECHNIQUE:  Multiplanar multisequence acquisition without contrast of the brain. FINDINGS:  Mild cerebral volume loss. No hydrocephalus. There is no acute infarct,  hemorrhage, extra-axial fluid collection, or mass effect. Mild chronic  microvascular ischemic disease in the bilateral frontal and parietal white  matter. Expected arterial flow-voids are present. Sagittal midline structures are within normal limits. No gradient susceptibility  artifact. Medial temporal lobes are symmetric. Impression  Cerebral volume loss and mild chronic microvascular ischemic disease. No acute  infarct.     Review of Systems:  A comprehensive review of systems was negative except for: Constitutional: positive for fatigue and malaise  Musculoskeletal: positive for myalgias, arthralgias and stiff joints  Neurological: positive for paresthesia and Numbness of the left side  Behvioral/Psych: positive for anxiety, bipolar and depression   Vitals:    05/04/22 2009 05/04/22 2233 05/05/22 0310 05/05/22 0754   BP: (!) 99/58 107/67 135/79 132/74   Pulse: 68 63 62 81   Resp: 18 18 18 18   Temp: 97.7 °F (36.5 °C) 97.6 °F (36.4 °C) 97.4 °F (36.3 °C) 97.2 °F (36.2 °C)   SpO2: 96% 95% 97% 98%   Weight:         Objective:     I      NEUROLOGICAL EXAM:    Appearance: The patient is well developed, well nourished, provides a coherent history and is in no acute distress. Mental Status: Oriented to time, place and person, and the president, cognitive function is normal and speech is fluent and no aphasia or dysarthria. Mood and affect appropriate. Cranial Nerves:   Intact visual fields. Fundi are benign, disc are flat, no lesions seen on funduscopy. FER, EOM's full, no nystagmus, no ptosis. Facial sensation is normal. Corneal reflexes are not tested. Facial movement is symmetric. Hearing is normal bilaterally. Palate is midline with normal sternocleidomastoid and trapezius muscles are normal. Tongue is midline. Neck without meningismus or bruits  Temporal arteries are not tender or enlarged  TMJ areas are not tender on palpation   Motor:  5/5 strength in upper and lower proximal and distal muscles. Normal bulk and tone. No fasciculations. Rapid alternating movement is symmetric and intact bilaterally   Reflexes:   Deep tendon reflexes 2+/4 and symmetrical.  No babinski or clonus present   Sensory:   Normal to touch, pinprick and vibration and temperature. DSS is intact   Gait:  Not tested because patient on stretcher in Doppler lab. Tremor:   No tremor noted.    Cerebellar:  Not tested abnormal cerebellar signs present on Romberg and tandem testing and finger-nose-finger exam. Neurovascular:  Normal heart sounds and regular rhythm, peripheral pulses intact, and no carotid bruits. Assessment:       ICD-10-CM ICD-9-CM    1. Cerebrovascular accident (CVA), unspecified mechanism ([facility] [address]  I63.9 434.91      Active Problems:    Paresthesia (5/4/2022)      CVA (cerebral vascular accident) ([address] (5/4/2022)      Transient ischemic attack involving right internal carotid artery (5/5/2022)      Bilateral carotid artery stenosis (5/5/2022)      Left sided numbness (5/5/2022)        Plan:     Patient with transient left-sided numbness but a completely negative work-up for any significant vascular obstructive disease with a CTA of the head neck is unremarkable and carotid Dopplers are unremarkable his echocardiogram is pending but he had no cardiac symptoms with this episode. He was not on any antiplatelet agents, and is now on aspirin, but was taking Crestor and now on Lipitor for history continue 1 of those 2 for TIA which seems to be the best diagnosis for this episode since his MRI scan was perfectly unremarkable. He can probably go home if his echocardiogram is okay. We can follow-up in stroke clinic in 2 to 4 weeks in the office.     Signed By: Ritu Cardona MD     May 5, 2022       CC: Vijaya Sherwood MD  FAX: [phone]

## 2022-05-05 NOTE — PROGRESS NOTES
Pt still c/o numbness in arm. No further complaints. Wife at bedside. Pt oob to chair. Tolerating reg consistency diet.  Pt c/o neck pain, lidocaine patch applied

## 2022-05-05 NOTE — PROGRESS NOTES
End of Shift Note    Bedside shift change report given to *** (oncoming nurse) by Ana Monge RN (offgoing nurse). Report included the following information SBAR, Kardex, MAR and Recent Results    Shift worked:  7p-7a   Shift summary and any significant changes:    No changes. MRI completed. Pt reports improvement in numbness to L arm. Concerns for physician to address: none   Zone phone for oncoming shift:  [phone]     Patient Information  Altaf Carias  59 y.o.  5/4/2022  1:54 PM by Gene Torres MD. Altaf Carias was admitted from Home    Problem List  Patient Active Problem List    Diagnosis Date Noted    Paresthesia 05/04/2022    CVA (cerebral vascular accident) ([facility] [address] 05/04/2022    Status post laparoscopic cholecystectomy     Abdominal pain 08/24/2011     Past Medical History:   Diagnosis Date    Cholelithiasis 8/22/2011    High cholesterol     Hypertension     Other ill-defined conditions(799.89)     hypercholesterolemia    Psychiatric disorder     anxiety    Seasonal allergies     Status post laparoscopic cholecystectomy 8/25/11       Core Measures:  CVA: No Yes  CHF:No No  PNA:No No    Activity:  Activity Level: Up with Assistance  Number times ambulated in hallways past shift: 0  Number of times OOB to chair past shift: 0    Cardiac:   Cardiac Monitoring: Yes      Cardiac Rhythm: Sinus Rhythm    Access:   Current line(s): PIV   Central Line? No    Genitourinary:   Urinary status: voiding  Urinary Catheter? No    Respiratory:   O2 Device: None (Room air)  Chronic home O2 use?: NO  Incentive spirometer at bedside: NO       GI:  Last Bowel Movement Date: 05/03/22  Current diet:  ADULT DIET Regular; Low Fat/Low Chol/High Fiber/COURT  Passing flatus: YES  Tolerating current diet: YES       Pain Management:   Patient states pain is manageable on current regimen: YES    Skin:  Jeff Score: 20  Interventions: increase time out of bed    Patient Safety:  Fall Score:  Total Score: 1  Interventions: bed/chair alarm, assistive device (walker, cane, etc), gripper socks and pt to call before getting OOB     @Rollbelt  @dexterity to release roll belt  Yes/No ( must document dexterity  here by stating Yes or No here, otherwise this is a restraint and must follow restraint documentation policy.)    DVT prophylaxis:  DVT prophylaxis Med- Yes  DVT prophylaxis SCD or COOPER- No     Wounds: (If Applicable)  Wounds- No  Location     Active Consults:  IP CONSULT TO NEUROLOGY  IP CONSULT TO PSYCHIATRY    Length of Stay:  Expected LOS: - - -  Actual LOS: 0  Discharge Plan: Yes; timothy Henry RN

## 2022-05-06 VITALS
HEIGHT: 71 IN | WEIGHT: 257.94 LBS | RESPIRATION RATE: 19 BRPM | TEMPERATURE: 98.4 F | BODY MASS INDEX: 36.11 KG/M2 | DIASTOLIC BLOOD PRESSURE: 76 MMHG | HEART RATE: 70 BPM | OXYGEN SATURATION: 96 % | SYSTOLIC BLOOD PRESSURE: 126 MMHG

## 2022-05-06 LAB
ECHO AV AREA PEAK VELOCITY: 2.1 CM2
ECHO AV AREA PEAK VELOCITY: 2.2 CM2
ECHO AV AREA VTI: 2.1 CM2
ECHO AV AREA/BSA VTI: 0.9 CM2/M2
ECHO AV CUSP MM: 2 CM
ECHO AV MEAN GRADIENT: 5 MMHG
ECHO AV MEAN VELOCITY: 1.1 M/S
ECHO AV PEAK GRADIENT: 8 MMHG
ECHO AV PEAK GRADIENT: 9 MMHG
ECHO AV PEAK VELOCITY: 1.5 M/S
ECHO AV PEAK VELOCITY: 1.5 M/S
ECHO AV VTI: 24.4 CM
ECHO LV E' LATERAL VELOCITY: 13 CM/S
ECHO LV E' SEPTAL VELOCITY: 7 CM/S
ECHO LV INTERNAL DIMENSION DIASTOLIC MMODE: 6.1 CM (ref 4.2–5.9)
ECHO LV INTERNAL DIMENSION SYSTOLIC MMODE: 4.8 CM
ECHO LV IVSD MMODE: 1.7 CM (ref 0.6–1)
ECHO LV IVSS MMODE: 1.8 CM
ECHO LV POSTERIOR WALL DIASTOLIC MMODE: 1.5 CM (ref 0.6–1)
ECHO LVOT AREA: 3.8 CM2
ECHO LVOT AV VTI INDEX: 0.55
ECHO LVOT DIAM: 2.2 CM
ECHO LVOT MEAN GRADIENT: 1 MMHG
ECHO LVOT PEAK GRADIENT: 3 MMHG
ECHO LVOT PEAK GRADIENT: 3 MMHG
ECHO LVOT PEAK VELOCITY: 0.8 M/S
ECHO LVOT PEAK VELOCITY: 0.8 M/S
ECHO LVOT STROKE VOLUME INDEX: 21.5 ML/M2
ECHO LVOT SV: 50.5 ML
ECHO LVOT VTI: 13.3 CM
ECHO MV A VELOCITY: 0.68 M/S
ECHO MV E DECELERATION TIME (DT): 223.3 MS
ECHO MV E VELOCITY: 0.77 M/S
ECHO MV E/A RATIO: 1.13
ECHO MV E/E' LATERAL: 5.92
ECHO MV E/E' RATIO (AVERAGED): 8.46
ECHO MV E/E' SEPTAL: 11
ECHO PV MAX VELOCITY: 0.8 M/S
ECHO PV PEAK GRADIENT: 2 MMHG
ECHO RV FREE WALL PEAK S': 15 CM/S
ECHO RV INTERNAL DIMENSION: 4.6 CM
ECHO RV TAPSE: 2.1 CM (ref 1.7–?)
GLUCOSE BLD STRIP.AUTO-MCNC: 96 MG/DL (ref 65–117)
SERVICE CMNT-IMP: NORMAL

## 2022-05-06 PROCEDURE — 74011250637 HC RX REV CODE- 250/637: Performed by: INTERNAL MEDICINE

## 2022-05-06 PROCEDURE — 94760 N-INVAS EAR/PLS OXIMETRY 1: CPT

## 2022-05-06 PROCEDURE — 74011250637 HC RX REV CODE- 250/637: Performed by: STUDENT IN AN ORGANIZED HEALTH CARE EDUCATION/TRAINING PROGRAM

## 2022-05-06 PROCEDURE — 99232 SBSQ HOSP IP/OBS MODERATE 35: CPT | Performed by: PSYCHIATRY & NEUROLOGY

## 2022-05-06 PROCEDURE — 82962 GLUCOSE BLOOD TEST: CPT

## 2022-05-06 PROCEDURE — 74011250636 HC RX REV CODE- 250/636: Performed by: STUDENT IN AN ORGANIZED HEALTH CARE EDUCATION/TRAINING PROGRAM

## 2022-05-06 RX ORDER — ATORVASTATIN CALCIUM 40 MG/1
40 TABLET, FILM COATED ORAL
Qty: 30 TABLET | Refills: 0 | Status: SHIPPED | OUTPATIENT
Start: 2022-05-06

## 2022-05-06 RX ORDER — GUAIFENESIN 100 MG/5ML
81 LIQUID (ML) ORAL DAILY
Qty: 30 TABLET | Refills: 0 | Status: ON HOLD | OUTPATIENT
Start: 2022-05-07 | End: 2022-08-04

## 2022-05-06 RX ADMIN — ASPIRIN 81 MG: 81 TABLET, CHEWABLE ORAL at 10:34

## 2022-05-06 RX ADMIN — ARIPIPRAZOLE 20 MG: 5 TABLET ORAL at 10:41

## 2022-05-06 RX ADMIN — HYDROCHLOROTHIAZIDE 12.5 MG: 25 TABLET ORAL at 10:34

## 2022-05-06 RX ADMIN — LISINOPRIL 20 MG: 20 TABLET ORAL at 10:36

## 2022-05-06 RX ADMIN — ESCITALOPRAM OXALATE 40 MG: 10 TABLET ORAL at 10:34

## 2022-05-06 RX ADMIN — CLONAZEPAM 1 MG: 0.5 TABLET ORAL at 10:35

## 2022-05-06 RX ADMIN — GABAPENTIN 300 MG: 300 CAPSULE ORAL at 10:34

## 2022-05-06 RX ADMIN — ENOXAPARIN SODIUM 30 MG: 100 INJECTION SUBCUTANEOUS at 10:36

## 2022-05-06 RX ADMIN — PANTOPRAZOLE SODIUM 40 MG: 40 TABLET, DELAYED RELEASE ORAL at 10:34

## 2022-05-06 NOTE — DISCHARGE SUMMARY
Discharge Summary      Name: Thea Mcduffie  [id number]  YOB: 1965 (Age: 59 y.o.)   Date of Admission: 5/4/2022  Date of Discharge: 5/6/2022  Attending Physician: Be Whalen MD    Discharge Diagnosis:   TIA  Substernal/neck pain   Depression  Anxiety  HTN    Consultations:  IP CONSULT TO NEUROLOGY  IP CONSULT TO PSYCHIATRY    Brief Admission History/Reason for Admission Per Aram Scheuermann, MD:   Thea Mcduffie is a 59 y.o. male with a family history of hypercholesterolemia, hypertension, depression, anxiety presented with left-sided numbness that started this morning. He started having left facial, left upper and lower extremity numbness that started this morning at 10 AM.  Currently his symptoms are around the same. He denies any weakness, visual changes, facial deviation. He mentions having \" heartburn\" like symptoms, describes pain at the back of her upper substernal region. He denies any abdominal pain, change in bowel habits. He has mild lower extremity edema but does not have any tenderness. He denies any bowel or bladder habit changes. He mentions feeling depressed, however denies any suicidal ideation or intent.     We were asked to admit for work up and evaluation of the above problems. Brief Hospital Course by Main Problems:   TIA  MRI head neg for acute CVA  Negative work-up for any significant vascular obstructive disease with a CTA of the head neck is unremarkable and carotid Dopplers are unremarkable  TSH wnl, A1C 5.2, LDL 46  Echo showed nl EF. No PFO noted. Cont' ASA and statin  Outpt f/u with neurology. It is now [facility] [place] recommendation that a patient with a confirmed stroke cannot drive for 6 months, however, this may be shortened if the patient does well in follow up. Follow up with the Neurology clinic to discuss this further.       Substernal/neck pain, resolved     Depression  Anxiety  Denies any suicidal ideation or intent  Psych consulted. Pt does not meet criteria for inpt psych at this time. No changes in medications recommended. \"Of note, Mr. Evelio Louis appears to have been ordered Lexapro 40mg daily, when the FDA approved max is 20mg We do often push SSRIs to supratherapeutic doses for OCD, but Mr. Evelio Louis does not have OCD. His EKG from yesterday showed a QT interval of 402 so not prolonged, but Lexapro can contribute to arrhythmias in higher doses. However, Mr. Evelio Louis stated his PCP is planning to change him to Prozac at their next meeting\".     Hypertension  Resume acei/hctz    Discharge Exam:  Patient seen and examined by me on discharge day. Pertinent Findings:  Visit Vitals  /76   Pulse 68   Temp 98.2 °F (36.8 °C)   Resp 19   Ht 5' 11\" (1.803 m)   Wt 117 kg (257 lb 15 oz)   SpO2 95%   BMI 35.98 kg/m²     Gen:    Not in distress  Chest: Clear lungs  CVS:   Regular rhythm. No edema  Abd:  Soft, not distended, not tender    Discharge/Recent Laboratory Results:  Recent Labs     05/05/22  0148 05/04/22  1412 05/04/22  1412      < > 133*   K 4.5   < > 4.0   *   < > 102   CO2 22   < > 29   BUN 13   < > 13   GLU 95   < > 114*   CA 8.5   < > 8.6   MG  --   --  2.2    < > = values in this interval not displayed. Recent Labs     05/05/22  0148   HGB 14.1   HCT 41.4   WBC 5.1          Discharge Medications:  Current Discharge Medication List      START taking these medications    Details   aspirin 81 mg chewable tablet Take 1 Tablet by mouth daily. Qty: 30 Tablet, Refills: 0  Start date: 5/7/2022      atorvastatin (LIPITOR) 40 mg tablet Take 1 Tablet by mouth nightly.   Qty: 30 Tablet, Refills: 0  Start date: 5/6/2022      OTHER outpt PT/OT to eval and treat  Qty: 1 Act, Refills: 0  Start date: 5/6/2022         CONTINUE these medications which have NOT CHANGED    Details   clonazePAM (KlonoPIN) 1 mg tablet Take 1 mg by mouth two (2) times a day.      gabapentin (NEURONTIN) 300 mg capsule Take 300 mg by mouth three (3) times daily. eszopiclone (LUNESTA) 3 mg tablet Take 3 mg by mouth nightly. lisinopril-hydrochlorothiazide (PRINZIDE, ZESTORETIC) 20-12.5 mg per tablet Take 1 Tab by mouth daily. 1 tablet daily for 30 days per prescription      ARIPiprazole (ABILIFY) 20 mg tablet Take 20 mg by mouth daily. meclizine (ANTIVERT) 25 mg tablet Take  by mouth three (3) times daily as needed. escitalopram (LEXAPRO) 20 mg tablet Take 40 mg by mouth daily. ondansetron (ZOFRAN ODT) 4 mg disintegrating tablet Take 1 Tab by mouth every eight (8) hours as needed for Nausea. Qty: 10 Tab, Refills: 0      naproxen (NAPROSYN) 500 mg tablet Take 1 Tab by mouth every twelve (12) hours as needed for Pain. Qty: 20 Tab, Refills: 0      acetaminophen (TYLENOL) 500 mg tablet Take 500 mg by mouth nightly as needed for Pain. Indications: ARTHRITIC PAIN      omeprazole (PRILOSEC) 20 mg capsule Take 20 mg by mouth daily. MULTI-VITAMIN PO Take  by mouth. omega-3 fatty acids-vitamin e (FISH OIL) 1,000 mg Cap Take 4 Caps by mouth daily.          STOP taking these medications       pantoprazole (Protonix) 40 mg tablet Comments:   Reason for Stopping:         rosuvastatin (CRESTOR) 20 mg tablet Comments:   Reason for Stopping:                 DISPOSITION:    Home with Family:    Home with HH/PT/OT/RN:    SNF/LTC:    [facility]:    OTHER: outpt PTOT         Follow up with:   PCP : Jocelyn Gant MD  Follow-up Information     Follow up With Specialties Details Why Contact Info    Jocelyn Gant MD Family Medicine In 3 days  [address]  [address] [facility] [address] [phone]      Liv Segal MD Neurology In 2 weeks  [address]  [address] [facility]  [address] [phone]              Total time in minutes spent coordinating this discharge (includes going over instructions, follow-up, prescriptions, and preparing report for sign off to her PCP) : 35 minutes

## 2022-05-06 NOTE — PROGRESS NOTES
Telebox and PIV removed. AVS printed and reviewed with the pt. Wife on the way to transport pt home. All questions answered. Outpatient PT script printed and given to pt. Discharge NIH performed and documented.     Dilan Deras RN

## 2022-05-06 NOTE — PROGRESS NOTES
Bedside shift change report given to David Arthur (oncoming nurse) by Florencia Cardona RN (offgoing nurse).   Report included the following information SBAR, Kardex, Intake/Output, MAR and Recent Results

## 2022-05-07 NOTE — PROGRESS NOTES
Consult  REFERRED BY:  Candy Whitney MD    CHIEF COMPLAINT: Numbness of the left side      Subjective:     Huy Reyes is a 59 y.o. right-handed  male, seen as a new patient, at the request of the hospitalist, for evaluation of left-sided numbness that came on yesterday he was out in the yard planting, and suddenly noticed that his left arm felt numb, this apparently was left side of his face, and then went down the left lower extremity, but no weakness, this came on around 10 AM and lasted till he got to the emergency room. He had a CT of the head that was normal CTA of the head and neck that were normal and an MRI of the brain that was normal.  He was given aspirin and Plavix in the ER and is now on a aspirin therapy and a high-dose statin. His carotid Doppler studies just done do not show any significant stenosis. He does feel mildly depressed a lot of chronic stomach problems. He denies any chest pain or palpitations with this event yesterday. He has never had this happen before. He was not taking any antiplatelets prior to admission. He takes Abilify 20 mg every day for his nerves, and depression, and clonazepam 1 mg twice a day, Lexapro 20 mg a day, Lunesta at night for sleep, gabapentin 300 mg 3 times a day, and Antivert as needed for dizziness, and he takes Crestor for his cholesterol and Prilosec for stomach and Zofran for nausea and Protonix for stomach and meclizine for dizziness and Zestoretic for his blood pressure and Naprosyn for arthritis. Patient feels back to normal now and says he would like to go home. He has been depressed but denies any increased anxiety. He has been cleared by psychiatry, and he is not able to go home today, and we will follow as needed for now. Neurologically he is stable and will hopefully go home today.     Past Medical History:   Diagnosis Date    Cholelithiasis 8/22/2011    High cholesterol     Hypertension     Other ill-defined conditions(799.89) hypercholesterolemia    Psychiatric disorder     anxiety    Seasonal allergies     Status post laparoscopic cholecystectomy 8/25/11      Past Surgical History:   Procedure Laterality Date    HX CHOLECYSTECTOMY      HX ORTHOPAEDIC      back srgery, arm surgery    PA LAP,CHOLECYSTECTOMY  8/25/11     Family History   Problem Relation Age of Onset    Heart Disease Mother     Headache Mother     Heart Disease Maternal Grandfather     Heart Disease Paternal Grandmother     Headache Sister     Headache Brother       Social History     Tobacco Use    Smoking status: Former Smoker    Smokeless tobacco: Former User     Quit date: 1/1/2001   Substance Use Topics    Alcohol use: Yes     Alcohol/week: 28.0 standard drinks     Types: 28 Cans of beer per week       No current facility-administered medications for this encounter. Current Outpatient Medications:     [START ON 5/7/2022] aspirin 81 mg chewable tablet, Take 1 Tablet by mouth daily. , Disp: 30 Tablet, Rfl: 0    atorvastatin (LIPITOR) 40 mg tablet, Take 1 Tablet by mouth nightly., Disp: 30 Tablet, Rfl: 0    OTHER, outpt PT/OT to eval and treat, Disp: 1 Act, Rfl: 0    clonazePAM (KlonoPIN) 1 mg tablet, Take 1 mg by mouth two (2) times a day., Disp: , Rfl:     gabapentin (NEURONTIN) 300 mg capsule, Take 300 mg by mouth three (3) times daily. , Disp: , Rfl:     eszopiclone (LUNESTA) 3 mg tablet, Take 3 mg by mouth nightly., Disp: , Rfl:     lisinopril-hydrochlorothiazide (PRINZIDE, ZESTORETIC) 20-12.5 mg per tablet, Take 1 Tab by mouth daily. 1 tablet daily for 30 days per prescription, Disp: , Rfl:     ARIPiprazole (ABILIFY) 20 mg tablet, Take 20 mg by mouth daily. , Disp: , Rfl:     meclizine (ANTIVERT) 25 mg tablet, Take  by mouth three (3) times daily as needed. , Disp: , Rfl:     escitalopram (LEXAPRO) 20 mg tablet, Take 40 mg by mouth daily. , Disp: , Rfl:     ondansetron (ZOFRAN ODT) 4 mg disintegrating tablet, Take 1 Tab by mouth every eight (8) hours as needed for Nausea. (Patient not taking: Reported on 5/4/2022), Disp: 10 Tab, Rfl: 0    naproxen (NAPROSYN) 500 mg tablet, Take 1 Tab by mouth every twelve (12) hours as needed for Pain. (Patient not taking: Reported on 5/4/2022), Disp: 20 Tab, Rfl: 0    acetaminophen (TYLENOL) 500 mg tablet, Take 500 mg by mouth nightly as needed for Pain. Indications: ARTHRITIC PAIN, Disp: , Rfl:     omeprazole (PRILOSEC) 20 mg capsule, Take 20 mg by mouth daily. (Patient not taking: Reported on 5/4/2022), Disp: , Rfl:     MULTI-VITAMIN PO, Take  by mouth. (Patient not taking: Reported on 5/4/2022), Disp: , Rfl:     omega-3 fatty acids-vitamin e (FISH OIL) 1,000 mg Cap, Take 4 Caps by mouth daily. (Patient not taking: Reported on 5/4/2022), Disp: , Rfl:         Allergies   Allergen Reactions    Codeine Nausea and Vomiting    Dilaudid [Hydromorphone (Bulk)] Nausea and Vomiting     Caused N&V when given in the hospital    Morphine Rash      MRI Results (most recent):  Results from Hospital Encounter encounter on 05/04/22    MRI BRAIN WO CONT    Narrative  EXAM: MRI BRAIN WO CONT    INDICATION: Left extremity numbness. COMPARISON: CT angiography head earlier today. [place] CONTRAST: None. TECHNIQUE:  Multiplanar multisequence acquisition without contrast of the brain. FINDINGS:  Mild cerebral volume loss. No hydrocephalus. There is no acute infarct,  hemorrhage, extra-axial fluid collection, or mass effect. Mild chronic  microvascular ischemic disease in the bilateral frontal and parietal white  matter. Expected arterial flow-voids are present. Sagittal midline structures are within normal limits. No gradient susceptibility  artifact. Medial temporal lobes are symmetric. Impression  Cerebral volume loss and mild chronic microvascular ischemic disease. No acute  infarct.       Results from [place] encounter on 05/04/22    MRI BRAIN WO CONT    Narrative  EXAM: MRI BRAIN WO CONT    INDICATION: Left extremity numbness. COMPARISON: CT angiography head earlier today. [place] CONTRAST: None. TECHNIQUE:  Multiplanar multisequence acquisition without contrast of the brain. FINDINGS:  Mild cerebral volume loss. No hydrocephalus. There is no acute infarct,  hemorrhage, extra-axial fluid collection, or mass effect. Mild chronic  microvascular ischemic disease in the bilateral frontal and parietal white  matter. Expected arterial flow-voids are present. Sagittal midline structures are within normal limits. No gradient susceptibility  artifact. Medial temporal lobes are symmetric. Impression  Cerebral volume loss and mild chronic microvascular ischemic disease. No acute  infarct. Review of Systems:  A comprehensive review of systems was negative except for: Constitutional: positive for fatigue and malaise  Musculoskeletal: positive for myalgias, arthralgias and stiff joints  Neurological: positive for paresthesia and Numbness of the left side  Behvioral/Psych: positive for anxiety, bipolar and depression   Vitals:    05/06/22 0343 05/06/22 0812 05/06/22 1049 05/06/22 1445   BP: 117/73 122/75 119/76 126/76   Pulse: 65 62 68 70   Resp: 18 19 19 19   Temp: 98.1 °F (36.7 °C) 98 °F (36.7 °C) 98.2 °F (36.8 °C) 98.4 °F (36.9 °C)   SpO2: 95% 94% 95% 96%   Weight:       Height:         Objective:     I      NEUROLOGICAL EXAM:    Appearance: The patient is well developed, well nourished, provides a coherent history and is in no acute distress. Mental Status: Oriented to time, place and person, and the president, cognitive function is normal and speech is fluent and no aphasia or dysarthria. Mood and affect appropriate. Cranial Nerves:   Intact visual fields. Fundi are benign, disc are flat, no lesions seen on funduscopy. FER, EOM's full, no nystagmus, no ptosis. Facial sensation is normal. Corneal reflexes are not tested. Facial movement is symmetric. Hearing is normal bilaterally. Palate is midline with normal sternocleidomastoid and trapezius muscles are normal. Tongue is midline. Neck without meningismus or bruits  Temporal arteries are not tender or enlarged  TMJ areas are not tender on palpation   Motor:  5/5 strength in upper and lower proximal and distal muscles. Normal bulk and tone. No fasciculations. Rapid alternating movement is symmetric and intact bilaterally   Reflexes:   Deep tendon reflexes 2+/4 and symmetrical.  No babinski or clonus present   Sensory:   Normal to touch, pinprick and vibration and temperature. DSS is intact   Gait:  Not tested because patient on stretcher in Doppler lab. Tremor:   No tremor noted. Cerebellar:  Not tested abnormal cerebellar signs present on Romberg and tandem testing and finger-nose-finger exam.   Neurovascular:  Normal heart sounds and regular rhythm, peripheral pulses intact, and no carotid bruits. Assessment:       ICD-10-CM ICD-9-CM    1. Cerebrovascular accident (CVA), unspecified mechanism ([address]  I63.9 434.91      Active Problems:    Paresthesia (5/4/2022)      CVA (cerebral vascular accident) ([address] (5/4/2022)      Transient ischemic attack involving right internal carotid artery (5/5/2022)      Bilateral carotid artery stenosis (5/5/2022)      Left sided numbness (5/5/2022)        Plan:     Patient with transient left-sided numbness but a completely negative work-up for any significant vascular obstructive disease with a CTA of the head neck is unremarkable and carotid Dopplers are unremarkable his echocardiogram is pending but he had no cardiac symptoms with this episode. He was not on any antiplatelet agents, and is now on aspirin, but was taking Crestor and now on Lipitor for history continue 1 of those 2 for TIA which seems to be the best diagnosis for this episode since his MRI scan was perfectly unremarkable. He can probably go home if his echocardiogram is okay.   He has been cleared by psychiatry, and he is not able to go home today, and we will follow as needed for now. Neurologically he is stable and will hopefully go home today.         Signed By: Renato Babb MD     May 6, 2022       CC: Allegra Wheeler MD  FAX: [phone]

## 2022-05-09 ENCOUNTER — HOSPITAL ENCOUNTER (EMERGENCY)
Age: 57
Discharge: LEFT BEFORE DISCHARGE | End: 2022-05-09
Payer: MEDICARE

## 2022-05-09 VITALS
BODY MASS INDEX: 36.4 KG/M2 | RESPIRATION RATE: 17 BRPM | SYSTOLIC BLOOD PRESSURE: 110 MMHG | TEMPERATURE: 97.7 F | DIASTOLIC BLOOD PRESSURE: 81 MMHG | WEIGHT: 260 LBS | HEART RATE: 75 BPM | HEIGHT: 71 IN | OXYGEN SATURATION: 97 %

## 2022-05-09 LAB
ALBUMIN SERPL-MCNC: 3.5 G/DL (ref 3.5–5)
ALBUMIN/GLOB SERPL: 1.1 {RATIO} (ref 1.1–2.2)
ALP SERPL-CCNC: 89 U/L (ref 45–117)
ALT SERPL-CCNC: 40 U/L (ref 12–78)
ANION GAP SERPL CALC-SCNC: 4 MMOL/L (ref 5–15)
AST SERPL-CCNC: 26 U/L (ref 15–37)
BASOPHILS # BLD: 0.1 K/UL (ref 0–0.1)
BASOPHILS NFR BLD: 1 % (ref 0–1)
BILIRUB SERPL-MCNC: 0.7 MG/DL (ref 0.2–1)
BUN SERPL-MCNC: 13 MG/DL (ref 6–20)
BUN/CREAT SERPL: 12 (ref 12–20)
CALCIUM SERPL-MCNC: 9.6 MG/DL (ref 8.5–10.1)
CHLORIDE SERPL-SCNC: 103 MMOL/L (ref 97–108)
CO2 SERPL-SCNC: 28 MMOL/L (ref 21–32)
CREAT SERPL-MCNC: 1.08 MG/DL (ref 0.7–1.3)
DIFFERENTIAL METHOD BLD: ABNORMAL
EOSINOPHIL # BLD: 0.2 K/UL (ref 0–0.4)
EOSINOPHIL NFR BLD: 3 % (ref 0–7)
ERYTHROCYTE [DISTWIDTH] IN BLOOD BY AUTOMATED COUNT: 13.2 % (ref 11.5–14.5)
GLOBULIN SER CALC-MCNC: 3.3 G/DL (ref 2–4)
GLUCOSE SERPL-MCNC: 100 MG/DL (ref 65–100)
HCT VFR BLD AUTO: 41.9 % (ref 36.6–50.3)
HGB BLD-MCNC: 14.5 G/DL (ref 12.1–17)
IMM GRANULOCYTES # BLD AUTO: 0.1 K/UL (ref 0–0.04)
IMM GRANULOCYTES NFR BLD AUTO: 1 % (ref 0–0.5)
LYMPHOCYTES # BLD: 3.5 K/UL (ref 0.8–3.5)
LYMPHOCYTES NFR BLD: 43 % (ref 12–49)
MCH RBC QN AUTO: 31 PG (ref 26–34)
MCHC RBC AUTO-ENTMCNC: 34.6 G/DL (ref 30–36.5)
MCV RBC AUTO: 89.7 FL (ref 80–99)
MONOCYTES # BLD: 0.8 K/UL (ref 0–1)
MONOCYTES NFR BLD: 9 % (ref 5–13)
NEUTS SEG # BLD: 3.6 K/UL (ref 1.8–8)
NEUTS SEG NFR BLD: 43 % (ref 32–75)
NRBC # BLD: 0 K/UL (ref 0–0.01)
NRBC BLD-RTO: 0 PER 100 WBC
PLATELET # BLD AUTO: 155 K/UL (ref 150–400)
PMV BLD AUTO: 9.9 FL (ref 8.9–12.9)
POTASSIUM SERPL-SCNC: 4.4 MMOL/L (ref 3.5–5.1)
PROT SERPL-MCNC: 6.8 G/DL (ref 6.4–8.2)
RBC # BLD AUTO: 4.67 M/UL (ref 4.1–5.7)
SODIUM SERPL-SCNC: 135 MMOL/L (ref 136–145)
TROPONIN-HIGH SENSITIVITY: <4 NG/L (ref 0–76)
WBC # BLD AUTO: 8.2 K/UL (ref 4.1–11.1)

## 2022-05-09 PROCEDURE — 36415 COLL VENOUS BLD VENIPUNCTURE: CPT

## 2022-05-09 PROCEDURE — 85025 COMPLETE CBC W/AUTO DIFF WBC: CPT

## 2022-05-09 PROCEDURE — 93005 ELECTROCARDIOGRAM TRACING: CPT

## 2022-05-09 PROCEDURE — 80053 COMPREHEN METABOLIC PANEL: CPT

## 2022-05-09 PROCEDURE — 84484 ASSAY OF TROPONIN QUANT: CPT

## 2022-05-09 PROCEDURE — 75810000275 HC EMERGENCY DEPT VISIT NO LEVEL OF CARE

## 2022-05-10 LAB
ATRIAL RATE: 69 BPM
CALCULATED P AXIS, ECG09: 59 DEGREES
CALCULATED R AXIS, ECG10: 56 DEGREES
CALCULATED T AXIS, ECG11: 72 DEGREES
DIAGNOSIS, 93000: NORMAL
P-R INTERVAL, ECG05: 220 MS
Q-T INTERVAL, ECG07: 382 MS
QRS DURATION, ECG06: 80 MS
QTC CALCULATION (BEZET), ECG08: 409 MS
VENTRICULAR RATE, ECG03: 69 BPM

## 2022-05-12 ENCOUNTER — OFFICE VISIT (OUTPATIENT)
Dept: NEUROLOGY | Age: 57
End: 2022-05-12
Payer: MEDICARE

## 2022-05-12 VITALS
OXYGEN SATURATION: 96 % | RESPIRATION RATE: 16 BRPM | DIASTOLIC BLOOD PRESSURE: 80 MMHG | HEART RATE: 64 BPM | HEIGHT: 71 IN | WEIGHT: 270 LBS | TEMPERATURE: 97.4 F | BODY MASS INDEX: 37.8 KG/M2 | SYSTOLIC BLOOD PRESSURE: 110 MMHG

## 2022-05-12 DIAGNOSIS — G45.1 TRANSIENT ISCHEMIC ATTACK INVOLVING RIGHT INTERNAL CAROTID ARTERY: Primary | ICD-10-CM

## 2022-05-12 DIAGNOSIS — I67.9 SMALL VESSEL DISEASE, CEREBROVASCULAR: ICD-10-CM

## 2022-05-12 DIAGNOSIS — R58 ECCHYMOSIS: ICD-10-CM

## 2022-05-12 DIAGNOSIS — N52.8 OTHER MALE ERECTILE DYSFUNCTION: ICD-10-CM

## 2022-05-12 PROBLEM — R20.0 LEFT SIDED NUMBNESS: Status: RESOLVED | Noted: 2022-05-05 | Resolved: 2022-05-12

## 2022-05-12 PROBLEM — R20.2 PARESTHESIA: Status: RESOLVED | Noted: 2022-05-04 | Resolved: 2022-05-12

## 2022-05-12 PROBLEM — Z87.898 HISTORY OF CHEST PAIN: Status: ACTIVE | Noted: 2022-05-12

## 2022-05-12 PROBLEM — F10.11 ALCOHOL ABUSE, IN REMISSION: Status: ACTIVE | Noted: 2022-05-12

## 2022-05-12 PROCEDURE — G8427 DOCREV CUR MEDS BY ELIG CLIN: HCPCS | Performed by: PSYCHIATRY & NEUROLOGY

## 2022-05-12 PROCEDURE — 1111F DSCHRG MED/CURRENT MED MERGE: CPT | Performed by: PSYCHIATRY & NEUROLOGY

## 2022-05-12 PROCEDURE — 99215 OFFICE O/P EST HI 40 MIN: CPT | Performed by: PSYCHIATRY & NEUROLOGY

## 2022-05-12 PROCEDURE — 3017F COLORECTAL CA SCREEN DOC REV: CPT | Performed by: PSYCHIATRY & NEUROLOGY

## 2022-05-12 PROCEDURE — G8432 DEP SCR NOT DOC, RNG: HCPCS | Performed by: PSYCHIATRY & NEUROLOGY

## 2022-05-12 PROCEDURE — G8417 CALC BMI ABV UP PARAM F/U: HCPCS | Performed by: PSYCHIATRY & NEUROLOGY

## 2022-05-12 RX ORDER — FLUOXETINE HYDROCHLORIDE 40 MG/1
40 CAPSULE ORAL 2 TIMES DAILY
COMMUNITY
Start: 2022-05-01

## 2022-05-12 NOTE — ASSESSMENT & PLAN NOTE
Patient is to continue on 81 mg aspirin and statin therapy    Patient is to continue to work to all of his comorbid conditions as managed by PCP and other specialists as appropriate    RECOMMENDATIONS:  - BP goal is less than 140/90  - Goal HbA1c is less than 7.   - LDL less than 70

## 2022-05-12 NOTE — LETTER
5/12/2022    Patient: Shweta Garcia   YOB: 1965   Date of Visit: 5/12/2022     Eric Mar MD  [address]  [address] [address]  Via Fax: [phone]    Dear Eric Mar MD,      Thank you for referring Mr. Shweta Garcia to [address] [place] [address] for evaluation. My notes for this consultation are attached. If you have questions, please do not hesitate to call me. I look forward to following your patient along with you.       Sincerely,    Chelsy Collier NP

## 2022-05-12 NOTE — ASSESSMENT & PLAN NOTE
Lower abdominal ecchymosis old and resolving with a mild area of warmth over the right lower region    Presumably related to injections of heparin per patient report    Appears old and resolving    Can follow-up with PCP  If the area that feels warm to touch worsens they are to go to urgent care for further evaluation

## 2022-05-12 NOTE — PATIENT INSTRUCTIONS
As per discussion    From a neurologic perspective you are doing well continue on your aspirin and all your other medications as prescribed    Continue to keep an eye on the bruising on your stomach related to the blood thinner injections  1 area seems a little bit warm if that worsens go to urgent care for evaluation    But the bruising is old it is starting to absorb and will take a little while till it fully absorbs but it should get better    Regarding your concerns regarding erectile dysfunction check with cardiology regarding clearance for medications given your cardiac history but either cardiology or primary care can write those prescriptions for you if you get clearance    Below some information reviewing signs and symptoms of stroke and stroke prevention  Please review them as appropriate  And if you feel like you are having any stroke or strokelike symptoms please go to your nearest emergency room for evaluation    You did not have a stroke you had a TIA which is a little bit different and I put information below regarding what a TIA is        [employer]    o Phone calls/patient messages:  Please allow up to 24 hours for someone in the office to contact you about your call or message. Be mindful your provider may be out of the office or your message may require further review. We encourage you to use [employer] for your messages as this is a faster, more efficient way to communicate with our office    o Medication Refills:  Prescription medications require up to 48 business hours to process. We encourage you to use [employer] for your refills. For controlled medications: Please allow up to 72 business hours to process. Certain medications may require you to  a written prescription at our office. NO narcotic/controlled medications will be prescribed after 4pm Monday through Friday or on weekends    o Form/Paperwork Completion:  We ask that you allow 7-14 business days.  You may also download your forms to MyChart to have your doctor print off. Learning About How to Prevent a Stroke  What is a stroke? A stroke is damage to the brain that occurs when a blood vessel in the brain bursts or is blocked by a blood clot. Without blood and the oxygen it carries, part of the brain starts to die. The part of the body controlled by the damaged area of the brain can't work properly. Brain damage can start within minutes of a stroke. But quick treatment can help limit the damage and increase the chance of a full recovery. What puts you at risk for stroke? A risk factor is anything that makes you more likely to have a particular health problem. Risk factors for stroke that you can manage or change include:  · Health problems like atrial fibrillation, diabetes, high blood pressure, high cholesterol, hardening of the arteries (atherosclerosis), and sickle cell disease. · Smoking. · Drinking more than 2 alcoholic drinks a day for men and 1 drink a day for women. · Being overweight. · Not eating healthy foods. · Not getting enough physical activity. Risk factors you can't change include:  · Having a previous stroke. · Family history of stroke. · Being older. · Being [place] American, [place] Native, Native [place], or [facility] [address]. · Being female. · Having certain problems during pregnancy, such as preeclampsia. · Being past menopause. Your doctor can help you know your risk. Then you and your doctor can talk about whether to take steps to lower it. How can you help prevent a stroke? · Manage health problems that raise your risk. These include atrial fibrillation, diabetes, high blood pressure, and high cholesterol. · Have a heart-healthy lifestyle. ? Don't smoke. If you need help quitting, talk to your doctor about stop-smoking programs and medicines. These can increase your chances of quitting for good. ?  Limit alcohol to 2 drinks a day for men and 1 drink a day for women.  ? Stay at a healthy weight. Lose weight if you need to.  ? Be active. Get at least 30 minutes of exercise on most days of the week. Walking is a good choice. You also may want to do other activities, such as running, swimming, cycling, or playing tennis or team sports. ? Eat heart-healthy foods. These include vegetables, fruits, nuts, beans, lean meat, fish, and whole grains. Limit sodium and sugar. ? If you think you may have a problem with alcohol or drug use, talk to your doctor. · If you use hormone therapy or hormonal birth control, talk with your doctor. Ask if these are right for you. They may raise the risk of stroke in some people. · Decide with your doctor whether you will also take medicines to help lower your risk. For example, you and your doctor may decide you will take a medicine that prevents blood clots. What are the BE FAST stroke warning signs? BE FAST is a simple way to remember the main symptoms of stroke. These symptoms happen suddenly. So knowing what to look for helps you know when to call for medical help. BE FAST stands for:  · B alance. Loss of balance or trouble walking. · E yes. Trouble seeing out of one or both eyes. · F ace. Weakness or drooping on one side of the face. · A rm. Weakness or numbness in an arm or leg. · S peech. Trouble speaking. · T sheri to call 911. Other stroke symptoms include sudden confusion, sudden trouble understanding simple statements, fainting, a seizure, and a sudden, severe headache. What are the symptoms of a stroke? Symptoms of a stroke happen quickly. A stroke may cause:  · Sudden numbness, tingling, weakness, or loss of movement in your face, arm, or leg, especially on only one side of your body. · Sudden vision changes. · Sudden trouble speaking. · Sudden confusion or trouble understanding simple statements. · Sudden problems with walking or balance.   · A sudden, severe headache that is different from past headaches. · Fainting. · A seizure. It's important to call for medical help if you have stroke symptoms. Quick treatment may save your life. And it may reduce the damage in your brain so that you have fewer problems after the stroke. Follow-up care is a key part of your treatment and safety. Be sure to make and go to all appointments, and call your doctor if you are having problems. It's also a good idea to know your test results and keep a list of the medicines you take. Where can you learn more? Go to http://www.gray.com/  Enter G757 in the search box to learn more about \"Learning About How to Prevent a Stroke. \"  Current as of: July 6, 2021               Content Version: 13.2  © 2006-2022 [employer] Care instructions adapted under license by [employer] (which disclaims liability or warranty for this information). If you have questions about a medical condition or this instruction, always ask your healthcare professional. [place] [address] any warranty or liability for your use of this information. Learning About BE FAST: Stroke Warning Signs  What are the BE FAST stroke warning signs? BE FAST is a simple way to remember the main symptoms of stroke. These symptoms happen suddenly. So knowing what to look for helps you know when to call for medical help. BE FAST stands for:  · B alance. Loss of balance or trouble walking. · E yes. Trouble seeing out of one or both eyes. · F ace. Weakness or drooping on one side of the face. · A rm. Weakness or numbness in an arm or leg. · S peech. Trouble speaking. · T sheri to call 911. Other stroke symptoms include sudden confusion, sudden trouble understanding simple statements, fainting, a seizure, and a sudden, severe headache. What are the symptoms of a stroke? Symptoms of a stroke happen quickly.  A stroke may cause:  · Sudden numbness, tingling, weakness, or loss of movement in your face, arm, or leg, especially on only one side of your body. · Sudden vision changes. · Sudden trouble speaking. · Sudden confusion or trouble understanding simple statements. · Sudden problems with walking or balance. · A sudden, severe headache that is different from past headaches. · Fainting. · A seizure. It's important to call for medical help if you have stroke symptoms. Quick treatment may save your life. And it may reduce the damage in your brain so that you have fewer problems after the stroke. What happens when you have a stroke? A stroke occurs when a blood vessel to the brain bursts or is blocked by a blood clot. The blood supply to part of the brain is reduced. Without blood and the oxygen it supplies, the nerve cells in that part of the brain die within minutes. As a result, the part of the body controlled by those cells cannot work properly. The effects of a stroke may range from mild to severe. They may get better, or they may last the rest of your life. A stroke can affect many things, including vision, speech, behavior, thought processes, and your ability to move. Where can you learn more? Go to http://www.chang.com/  Enter I943 in the search box to learn more about \"Learning About BE FAST: Stroke Warning Signs. \"  Current as of: July 6, 2021               Content Version: 13.2  © 2006-2022 [employer]. Care instructions adapted under license by [employer] (which disclaims liability or warranty for this information). If you have questions about a medical condition or this instruction, always ask your healthcare professional. [place] [address] any warranty or liability for your use of this information. Transient Ischemic Attack: Care Instructions  Overview     A transient ischemic attack (TIA) is when blood flow to a part of your brain is blocked for a short time.  A TIA causes stroke symptoms that can last for at least a few minutes. Stroke symptoms include sudden weakness or loss of movement in a part of your body, confusion, vision changes, trouble speaking, and trouble walking or balancing. But unlike a stroke, a TIA doesn't cause lasting brain damage. TIAs are often warning signs of a stroke. Some people who have a TIA may have a stroke in the future. If you have symptoms of a stroke, call for emergency help right away. Quick treatment can help limit damage to the brain and increase the chance of recovery. You can take steps to help prevent a stroke. These steps include managing health problems that raise your risk, taking medicine that prevents blood clots, and having a heart-healthy lifestyle. This lifestyle includes being active, eating healthy foods, staying at a healthy weight, and not smoking. Follow-up care is a key part of your treatment and safety. Be sure to make and go to all appointments, and call your doctor if you are having problems. It's also a good idea to know your test results and keep a list of the medicines you take. How can you care for yourself at home? Medicines    · Be safe with medicines. Take your medicines exactly as prescribed. Call your doctor if you think you are having a problem with your medicine.     · If you take a blood thinner, such as aspirin, be sure you get instructions about how to take your medicine safely. Blood thinners can cause serious bleeding problems.     · Call your doctor if you are not able to take your medicines for any reason.     · Do not take any over-the-counter medicines or herbal products without talking to your doctor first.     · If you use hormonal birth control or hormone therapy, talk to your doctor. Ask if these are right for you. They may raise the risk of stroke in some people. Heart-healthy lifestyle    · Do not smoke. If you need help quitting, talk to your doctor about stop-smoking programs and medicines.     · Be active.  If your doctor recommends it, get more exercise. Walking is a good choice. Bit by bit, increase the amount you walk every day. Try for at least 30 minutes on most days of the week. You also may want to swim, bike, or do other activities.     · Eat heart-healthy foods. These include vegetables, fruits, nuts, beans, lean meat, fish, and whole grains. Limit sodium and sugar.     · Stay at a healthy weight. Lose weight if you need to.     · Limit alcohol to 2 drinks a day for men and 1 drink a day for women. Staying healthy    · Manage other health problems that raise your risk of stroke. These include atrial fibrillation, diabetes, high blood pressure, and high cholesterol.     · If you think you may have a problem with alcohol or drug use, talk to your doctor.     · Get the flu vaccine every year. When should you call for help? Call 911 anytime you think you may need emergency care. For example, call if:    · You have symptoms of a stroke. These may include:  ? Sudden numbness, tingling, weakness, or loss of movement in your face, arm, or leg, especially on only one side of your body. ? Sudden vision changes. ? Sudden trouble speaking. ? Sudden confusion or trouble understanding simple statements. ? Sudden problems with walking or balance. ? A sudden, severe headache that is different from past headaches. Call 911 even if these symptoms go away in a few minutes.     · You feel like you are having another TIA. Watch closely for changes in your health, and be sure to contact your doctor if you have any problems. Where can you learn more? Go to http://www.Sudiksha.com/  Enter I231 in the search box to learn more about \"Transient Ischemic Attack: Care Instructions. \"  Current as of: July 6, 2021               Content Version: 13.2  © 1772-7862 [employer]. Care instructions adapted under license by [employer] (which disclaims liability or warranty for this information).  If you have questions about a medical condition or this instruction, always ask your healthcare professional. [place] [address] any warranty or liability for your use of this information.

## 2022-05-12 NOTE — PROGRESS NOTES
Chief Complaint   Patient presents with    Follow-up    TIA     1. Have you been to the ER, urgent care clinic since your last visit? Hospitalized since your last visit? Yes    2. Have you seen or consulted any other health care providers outside of the [address] [place] [address] since your last visit? Yes PCP   Include any pap smears or colon screening. NO    Patient states taking Ambien for sleep not sure of dosage.   He is concerned about his stomach with bruising from injections (Heparin)

## 2022-05-12 NOTE — ASSESSMENT & PLAN NOTE
Patient complaining of erectile dysfunction wants to know if he can take medication for this due to history of cardiac disease I have asked him to follow-up with cardiology for clearance

## 2022-05-12 NOTE — ASSESSMENT & PLAN NOTE
Stable without recurrence of symptoms  Patient is to continue on 81 mg aspirin daily along with statin therapy    Patient is to continue to work to all of his comorbid conditions as managed by PCP and other specialists as appropriate    RECOMMENDATIONS:  - BP goal is less than 140/90  - Goal HbA1c is less than 7.   - LDL less than 70

## 2022-05-12 NOTE — PROGRESS NOTES
[facility] 81  In Office FOLLOW-UP VISIT         French Quezada is a 59 y.o. male who presents today for the following:  Chief Complaint   Patient presents with    Follow-up    TIA         ASSESSMENT AND PLAN  Patient is known to this practice. This is my first time seeing the patient. Chart and history reviewed in detail at today's office visit. 1. Transient ischemic attack involving right internal carotid artery  Assessment & Plan:   Stable without recurrence of symptoms  Patient is to continue on 81 mg aspirin daily along with statin therapy    Patient is to continue to work to all of his comorbid conditions as managed by PCP and other specialists as appropriate    RECOMMENDATIONS:  - BP goal is less than 140/90  - Goal HbA1c is less than 7.   - LDL less than 70   2. Ecchymosis  Comments:  Bilateral lower right and left quadrant consistent where heparin injections were given per patient report  Assessment & Plan:   Lower abdominal ecchymosis old and resolving with a mild area of warmth over the right lower region    Presumably related to injections of heparin per patient report    Appears old and resolving    Can follow-up with PCP  If the area that feels warm to touch worsens they are to go to urgent care for further evaluation  3. Other male erectile dysfunction  Assessment & Plan:   Patient complaining of erectile dysfunction wants to know if he can take medication for this due to history of cardiac disease I have asked him to follow-up with cardiology for clearance  4.  Small vessel disease, cerebrovascular  Comments:  Mild on MRI scan of 5/4/2022  Assessment & Plan:    Patient is to continue on 81 mg aspirin and statin therapy    Patient is to continue to work to all of his comorbid conditions as managed by PCP and other specialists as appropriate    RECOMMENDATIONS:  - BP goal is less than 140/90  - Goal HbA1c is less than 7.   - LDL less than 70        Patient and/or family was given time to ask questions and voice concerns. I believe all questions concerns were adequately addressed at this  office visit. Patient and/or family also verbalized agreement and understanding of the above-stated plan    Patient remains a complex patient secondary to polypharmacy, significant comorbid conditions, and use of high-risk medications which complicate the decision making process related to patient's neurologic diagnosis          ICD-10-CM ICD-9-CM    1. Transient ischemic attack involving right internal carotid artery  G45.1 435.8    2. Ecchymosis  R58 459.89     Bilateral lower right and left quadrant consistent where heparin injections were given per patient report   3. Other male erectile dysfunction  N52.8 607.84    4. Small vessel disease, cerebrovascular  I67.9 437.9     Mild on MRI scan of 5/4/2022         I attest that 40 minutes was spent on today's visit reviewing medical records and diagnostic testing deemed pertinent to this patient's care, along with direct time spent at patient's visit including the history, physical assessment and plan, discussing diagnosis and management along with documentation. HPI  Historical Data  Patient is known to the practice and was previously seen by Dr. Angel Olivas while in the hospital    Neurologic diagnosis  TIA   May 6, 2022   Presented with left-sided numbness to include face upper and lower extremities   MRI head neg for acute CVA  Negative work-up for any significant vascular obstructive disease with a CTA of the head neck is unremarkable and carotid Dopplers are unremarkable  TSH wnl, A1C 5.2, LDL 46  Echo showed nl EF. No PFO noted.   Cont' ASA and statin       Other significant comorbid conditions/concerns  Dyslipidemia  Hypertension  Depression and anxiety  History of chest pain      Interim Data:      Patient denies any new or worsening signs or symptoms consistent with cerebrovascular insufficiency since last evaluated  Pt continues on his prescribed medication of 81 mg aspirin daily along with statin therapy and denies side effects    Patient continues to work to keep risk factor comorbid conditions under good control    Reviewed results of all test that were done during the hospital with the patient and his wife    Other:  Patient complaining of significant bruising on his abdomen where he reports he was having his blood thinner injections  It is not painful but he is concerned about it      ED   Patient is wondering if she can take any the medications to help with the ED cancel          Pertinent diagnostic data    Results from [place] encounter on 05/04/22    MRI BRAIN WO CONT    Impression  Cerebral volume loss and mild chronic microvascular ischemic disease. No acute  infarct. Results from [place] [facility] encounter on 12/30/13    MRI KNEE RT WO CONT    Impression  :    1 . Radial tearing posterior horn medial meniscus without subluxation of the  body  2. Prominent degeneration of the lateral meniscus most severe in the  anterior horn where there is associated complex tearing. The body is not  subluxed from the joint  3. Mild patellofemoral tracking abnormality with degeneration of the  patellofemoral compartment and loose body in the Baker's cyst          Signing/Reading Doctor: Liza Aguilar ([id number])  Approved: Liza Aguilar ([id number])  Dec 30 2013 10:18AM              Allergies   Allergen Reactions    Codeine Nausea and Vomiting    Dilaudid [Hydromorphone (Bulk)] Nausea and Vomiting     Caused N&V when given in the hospital    Morphine Rash       Current Outpatient Medications   Medication Sig    FLUoxetine (PROzac) 40 mg capsule Take 40 mg by mouth daily.  aspirin 81 mg chewable tablet Take 1 Tablet by mouth daily.  atorvastatin (LIPITOR) 40 mg tablet Take 1 Tablet by mouth nightly.  clonazePAM (KlonoPIN) 1 mg tablet Take 1 mg by mouth two (2) times a day.     naproxen (NAPROSYN) 500 mg tablet Take 1 Tab by mouth every twelve (12) hours as needed for Pain.  gabapentin (NEURONTIN) 300 mg capsule Take 300 mg by mouth two (2) times a day.  acetaminophen (TYLENOL) 500 mg tablet Take 500 mg by mouth nightly as needed for Pain. Indications: ARTHRITIC PAIN    lisinopril-hydrochlorothiazide (PRINZIDE, ZESTORETIC) 20-12.5 mg per tablet Take 1 Tab by mouth daily. 1 tablet daily for 30 days per prescription    ARIPiprazole (ABILIFY) 20 mg tablet Take 20 mg by mouth daily.  meclizine (ANTIVERT) 25 mg tablet Take  by mouth three (3) times daily as needed.  escitalopram (LEXAPRO) 20 mg tablet Take 40 mg by mouth daily.  omega-3 fatty acids-vitamin e (FISH OIL) 1,000 mg Cap Take 1 Capsule by mouth daily. No current facility-administered medications for this visit. Past medical history/surgical history, family history, and social history have been reviewed for today's visit      ROS    A ten system review of constitutional, cardiovascular, respiratory, musculoskeletal, endocrine, skin, SHEENT, genitourinary, psychiatric and neurologic systems was obtained and is unremarkable except as mentioned under HPI          EXAMINATION:     Visit Vitals  /80 (BP 1 Location: Left upper arm, BP Patient Position: Sitting, BP Cuff Size: Large adult)   Pulse 64   Temp 97.4 °F (36.3 °C) (Temporal)   Resp 16   Ht 5' 11\" (1.803 m)   Wt 270 lb (122.5 kg)   SpO2 96%   BMI 37.66 kg/m²         General appearance: Patient is well-developed and well-nourished in no apparent distress and well groomed.     Psych/mental health:  Affect: Appropriate    PHQ  3 most recent PHQ Screens 5/12/2022   Little interest or pleasure in doing things Not at all   Feeling down, depressed, irritable, or hopeless Not at all   Total Score PHQ 2 0       HEENT:   Normocephalic  With evidence of trauma: No  Full range of motion head neck: Yes  Tenderness to palpation of the head neck region: No      Cardiovascular:     Extremities warm to touch: Yes  Extremity swelling: No  Discoloration: No  Evidence of PVD: No    Respiratory:   Dyspnea on exertion: No   Abnormal effort on casual observation: No   Use of portable oxygen: No   Evidence of cyanosis: No     Musculoskeletal:   Evidence of significant bone deformities: No   Spinal curvature: No     Integumentary:    Obvious bruising: Ecchymosis lower abdomen left and right side; old and resolving with small area of warmth over the right lower abdominal area but without significant swelling or tenderness  Lacerations or discoloration on casual observation: No       Neurological Examination:   Mental Status:        MMSE  No flowsheet data found. Formal testing was not completed      there was nothing concerning on general observation and discussion. Alert oriented and appropriate to general conversation  Normal processing on general observation  Followed conversation and responded seemingly appropriate throughout the office visit  No word finding difficulties noted on casual observation  Able to follow directions without difficulty     Cranial Nerves:    EOMs intact gaze is conjugate  No nystagmus is appreciated  Facial motor intact bilaterally  Hearing intact to conversation  Voice with normal projection, no evidence of secretion pooling  Shoulder shrug intact bilaterally  No tongue deviation appreciated     Motor:   Normal bulk  No tremor appreciated on today's exam  No abnormal movements appreciated on today's exam  Moves extremities spontaneously and with purpose  5/5 x 4      Sensation: Intact to light touch    Coordination/Cerebellar:   Grossly intact    Gait: Ambulates independently    Reflexes: Not tested    Fall risk assessment  No flowsheet data found. Follow-up and Dispositions    · Return if symptoms worsen or fail to improve.              Chuck Swanson MS, ANP-BC, [facility]

## 2022-08-03 ENCOUNTER — HOSPITAL ENCOUNTER (INPATIENT)
Age: 57
LOS: 1 days | Discharge: HOME | DRG: 103 | End: 2022-08-04
Attending: EMERGENCY MEDICINE | Admitting: FAMILY MEDICINE
Payer: MEDICARE

## 2022-08-03 ENCOUNTER — APPOINTMENT (OUTPATIENT)
Dept: CT IMAGING | Age: 57
DRG: 103 | End: 2022-08-03
Attending: EMERGENCY MEDICINE
Payer: MEDICARE

## 2022-08-03 DIAGNOSIS — R51.9 GENERALIZED HEADACHE: ICD-10-CM

## 2022-08-03 DIAGNOSIS — R20.0 LEFT SIDED NUMBNESS: Primary | ICD-10-CM

## 2022-08-03 DIAGNOSIS — R47.89 EPISODE OF CHANGE IN SPEECH: ICD-10-CM

## 2022-08-03 PROBLEM — I63.9 CVA (CEREBRAL VASCULAR ACCIDENT) (HCC): Status: ACTIVE | Noted: 2022-08-03

## 2022-08-03 LAB
ANION GAP SERPL CALC-SCNC: 8 MMOL/L (ref 5–15)
BASOPHILS # BLD: 0 K/UL (ref 0–0.1)
BASOPHILS NFR BLD: 0 % (ref 0–1)
BUN SERPL-MCNC: 9 MG/DL (ref 6–20)
BUN/CREAT SERPL: 11 (ref 12–20)
CALCIUM SERPL-MCNC: 8.7 MG/DL (ref 8.5–10.1)
CHLORIDE SERPL-SCNC: 100 MMOL/L (ref 97–108)
CO2 SERPL-SCNC: 24 MMOL/L (ref 21–32)
COMMENT, HOLDF: NORMAL
CREAT SERPL-MCNC: 0.82 MG/DL (ref 0.7–1.3)
DIFFERENTIAL METHOD BLD: ABNORMAL
EOSINOPHIL # BLD: 0.2 K/UL (ref 0–0.4)
EOSINOPHIL NFR BLD: 3 % (ref 0–7)
ERYTHROCYTE [DISTWIDTH] IN BLOOD BY AUTOMATED COUNT: 13.1 % (ref 11.5–14.5)
GLUCOSE SERPL-MCNC: 108 MG/DL (ref 65–100)
HCT VFR BLD AUTO: 37.4 % (ref 36.6–50.3)
HGB BLD-MCNC: 13.3 G/DL (ref 12.1–17)
IMM GRANULOCYTES # BLD AUTO: 0.1 K/UL (ref 0–0.04)
IMM GRANULOCYTES NFR BLD AUTO: 1 % (ref 0–0.5)
INR PPP: 1.1 (ref 0.9–1.1)
LYMPHOCYTES # BLD: 2.4 K/UL (ref 0.8–3.5)
LYMPHOCYTES NFR BLD: 32 % (ref 12–49)
MCH RBC QN AUTO: 32 PG (ref 26–34)
MCHC RBC AUTO-ENTMCNC: 35.6 G/DL (ref 30–36.5)
MCV RBC AUTO: 89.9 FL (ref 80–99)
MONOCYTES # BLD: 0.7 K/UL (ref 0–1)
MONOCYTES NFR BLD: 9 % (ref 5–13)
NEUTS SEG # BLD: 4 K/UL (ref 1.8–8)
NEUTS SEG NFR BLD: 55 % (ref 32–75)
NRBC # BLD: 0 K/UL (ref 0–0.01)
NRBC BLD-RTO: 0 PER 100 WBC
PLATELET # BLD AUTO: 123 K/UL (ref 150–400)
PMV BLD AUTO: 10 FL (ref 8.9–12.9)
POTASSIUM SERPL-SCNC: 3.8 MMOL/L (ref 3.5–5.1)
PROTHROMBIN TIME: 11.2 SEC (ref 9–11.1)
RBC # BLD AUTO: 4.16 M/UL (ref 4.1–5.7)
SAMPLES BEING HELD,HOLD: NORMAL
SODIUM SERPL-SCNC: 132 MMOL/L (ref 136–145)
UR CULT HOLD, URHOLD: NORMAL
WBC # BLD AUTO: 7.3 K/UL (ref 4.1–11.1)

## 2022-08-03 PROCEDURE — 70496 CT ANGIOGRAPHY HEAD: CPT

## 2022-08-03 PROCEDURE — APPNB30 APP NON BILLABLE TIME 0-30 MINS: Performed by: NURSE PRACTITIONER

## 2022-08-03 PROCEDURE — 65270000046 HC RM TELEMETRY

## 2022-08-03 PROCEDURE — 74011250637 HC RX REV CODE- 250/637: Performed by: FAMILY MEDICINE

## 2022-08-03 PROCEDURE — 36415 COLL VENOUS BLD VENIPUNCTURE: CPT

## 2022-08-03 PROCEDURE — 70450 CT HEAD/BRAIN W/O DYE: CPT

## 2022-08-03 PROCEDURE — 74011250637 HC RX REV CODE- 250/637: Performed by: EMERGENCY MEDICINE

## 2022-08-03 PROCEDURE — 99285 EMERGENCY DEPT VISIT HI MDM: CPT

## 2022-08-03 PROCEDURE — 74011000636 HC RX REV CODE- 636: Performed by: EMERGENCY MEDICINE

## 2022-08-03 PROCEDURE — 74011250636 HC RX REV CODE- 250/636: Performed by: EMERGENCY MEDICINE

## 2022-08-03 PROCEDURE — 74011250636 HC RX REV CODE- 250/636: Performed by: FAMILY MEDICINE

## 2022-08-03 PROCEDURE — 4A03X5D MEASUREMENT OF ARTERIAL FLOW, INTRACRANIAL, EXTERNAL APPROACH: ICD-10-PCS | Performed by: STUDENT IN AN ORGANIZED HEALTH CARE EDUCATION/TRAINING PROGRAM

## 2022-08-03 PROCEDURE — 85025 COMPLETE CBC W/AUTO DIFF WBC: CPT

## 2022-08-03 PROCEDURE — 96374 THER/PROPH/DIAG INJ IV PUSH: CPT

## 2022-08-03 PROCEDURE — 80048 BASIC METABOLIC PNL TOTAL CA: CPT

## 2022-08-03 PROCEDURE — 85610 PROTHROMBIN TIME: CPT

## 2022-08-03 PROCEDURE — 0042T CT CODE NEURO PERF W CBF: CPT

## 2022-08-03 PROCEDURE — 93005 ELECTROCARDIOGRAM TRACING: CPT

## 2022-08-03 RX ORDER — ARIPIPRAZOLE 15 MG/1
15 TABLET ORAL EVERY EVENING
COMMUNITY

## 2022-08-03 RX ORDER — ACETAMINOPHEN 325 MG/1
650 TABLET ORAL
Status: DISCONTINUED | OUTPATIENT
Start: 2022-08-03 | End: 2022-08-04 | Stop reason: HOSPADM

## 2022-08-03 RX ORDER — KETOROLAC TROMETHAMINE 30 MG/ML
15 INJECTION, SOLUTION INTRAMUSCULAR; INTRAVENOUS
Status: DISCONTINUED | OUTPATIENT
Start: 2022-08-03 | End: 2022-08-04 | Stop reason: HOSPADM

## 2022-08-03 RX ORDER — SODIUM CHLORIDE 9 MG/ML
75 INJECTION, SOLUTION INTRAVENOUS CONTINUOUS
Status: DISCONTINUED | OUTPATIENT
Start: 2022-08-03 | End: 2022-08-04 | Stop reason: HOSPADM

## 2022-08-03 RX ORDER — ALBUTEROL SULFATE 90 UG/1
1 AEROSOL, METERED RESPIRATORY (INHALATION)
Status: ON HOLD | COMMUNITY
End: 2022-08-04

## 2022-08-03 RX ORDER — CLONAZEPAM 2 MG/1
TABLET ORAL 2 TIMES DAILY
COMMUNITY

## 2022-08-03 RX ORDER — METOCLOPRAMIDE HYDROCHLORIDE 5 MG/ML
10 INJECTION INTRAMUSCULAR; INTRAVENOUS
Status: COMPLETED | OUTPATIENT
Start: 2022-08-03 | End: 2022-08-03

## 2022-08-03 RX ORDER — CARVEDILOL 6.25 MG/1
6.25 TABLET ORAL 2 TIMES DAILY WITH MEALS
COMMUNITY

## 2022-08-03 RX ORDER — MONTELUKAST SODIUM 10 MG/1
10 TABLET ORAL DAILY
COMMUNITY

## 2022-08-03 RX ORDER — FAMOTIDINE 20 MG/1
20 TABLET, FILM COATED ORAL EVERY 12 HOURS
Status: DISCONTINUED | OUTPATIENT
Start: 2022-08-03 | End: 2022-08-04 | Stop reason: HOSPADM

## 2022-08-03 RX ORDER — GUAIFENESIN 100 MG/5ML
324 LIQUID (ML) ORAL
Status: COMPLETED | OUTPATIENT
Start: 2022-08-03 | End: 2022-08-03

## 2022-08-03 RX ORDER — ACETAMINOPHEN 500 MG
1000 TABLET ORAL ONCE
Status: COMPLETED | OUTPATIENT
Start: 2022-08-03 | End: 2022-08-03

## 2022-08-03 RX ORDER — BUTALBITAL, ACETAMINOPHEN AND CAFFEINE 50; 325; 40 MG/1; MG/1; MG/1
1 TABLET ORAL
Status: DISCONTINUED | OUTPATIENT
Start: 2022-08-03 | End: 2022-08-04 | Stop reason: HOSPADM

## 2022-08-03 RX ORDER — ACETAMINOPHEN 650 MG/1
650 SUPPOSITORY RECTAL
Status: DISCONTINUED | OUTPATIENT
Start: 2022-08-03 | End: 2022-08-04 | Stop reason: HOSPADM

## 2022-08-03 RX ORDER — ATORVASTATIN CALCIUM 40 MG/1
80 TABLET, FILM COATED ORAL
Status: DISCONTINUED | OUTPATIENT
Start: 2022-08-03 | End: 2022-08-04 | Stop reason: HOSPADM

## 2022-08-03 RX ORDER — AMLODIPINE BESYLATE 5 MG/1
5 TABLET ORAL EVERY EVENING
Status: ON HOLD | COMMUNITY
End: 2022-08-04

## 2022-08-03 RX ORDER — GUAIFENESIN 100 MG/5ML
81 LIQUID (ML) ORAL DAILY
Status: DISCONTINUED | OUTPATIENT
Start: 2022-08-04 | End: 2022-08-04 | Stop reason: HOSPADM

## 2022-08-03 RX ORDER — ZOLPIDEM TARTRATE 10 MG/1
TABLET ORAL
COMMUNITY

## 2022-08-03 RX ORDER — ONDANSETRON 2 MG/ML
4 INJECTION INTRAMUSCULAR; INTRAVENOUS
Status: DISCONTINUED | OUTPATIENT
Start: 2022-08-03 | End: 2022-08-04 | Stop reason: HOSPADM

## 2022-08-03 RX ADMIN — ATORVASTATIN CALCIUM 80 MG: 40 TABLET, FILM COATED ORAL at 21:24

## 2022-08-03 RX ADMIN — IOPAMIDOL 100 ML: 755 INJECTION, SOLUTION INTRAVENOUS at 16:11

## 2022-08-03 RX ADMIN — SODIUM CHLORIDE 1000 ML: 9 INJECTION, SOLUTION INTRAVENOUS at 17:22

## 2022-08-03 RX ADMIN — ASPIRIN 81 MG CHEWABLE TABLET 324 MG: 81 TABLET CHEWABLE at 17:23

## 2022-08-03 RX ADMIN — IOPAMIDOL 20 ML: 755 INJECTION, SOLUTION INTRAVENOUS at 16:11

## 2022-08-03 RX ADMIN — SODIUM CHLORIDE 75 ML/HR: 9 INJECTION, SOLUTION INTRAVENOUS at 21:24

## 2022-08-03 RX ADMIN — METOCLOPRAMIDE 10 MG: 5 INJECTION, SOLUTION INTRAMUSCULAR; INTRAVENOUS at 17:22

## 2022-08-03 RX ADMIN — FAMOTIDINE 20 MG: 20 TABLET, FILM COATED ORAL at 21:24

## 2022-08-03 RX ADMIN — ACETAMINOPHEN 1000 MG: 500 TABLET ORAL at 17:23

## 2022-08-03 NOTE — ED PROVIDER NOTES
57M w/ hx HTN, HLD, smoking p/w 1hr facial numbness. Pt reports 1 hr of left sided facial numbness, speech changes. Prior to this was feeling normal. Denied any pain anywhere on initial eval but reported to tele neurologist that was having a HA. Pt also reports nausea but no vomiting, diarrhea, F/C, cough. Denies any dyspnea, chest/abd or neck pain. Had a similar presentation a few months ago and a negative stroke w/u. Pt denies seizures, syncope or ext weakness/numbness. Smokes. No drugs or ETOH. EMS activated a Level 1 CVA from the field.        Past Medical History:   Diagnosis Date    Cholelithiasis 2011    High cholesterol     Hypertension     Left sided numbness 2022    Other ill-defined conditions(799.89)     hypercholesterolemia    Paresthesia 2022    Psychiatric disorder     anxiety    Seasonal allergies     Status post laparoscopic cholecystectomy 11       Past Surgical History:   Procedure Laterality Date    HX CHOLECYSTECTOMY      HX ORTHOPAEDIC      back srgery, arm surgery    MA LAP,CHOLECYSTECTOMY  11         Family History:   Problem Relation Age of Onset    Heart Disease Mother     Headache Mother     Heart Disease Maternal Grandfather     Heart Disease Paternal Grandmother     Headache Sister     Headache Brother        Social History     Socioeconomic History    Marital status:      Spouse name: Not on file    Number of children: Not on file    Years of education: Not on file    Highest education level: Not on file   Occupational History    Not on file   Tobacco Use    Smoking status: Former     Types: Cigarettes     Quit date: 2020     Years since quittin.2    Smokeless tobacco: Former     Quit date: 2001   Vaping Use    Vaping Use: Never used   Substance and Sexual Activity    Alcohol use: Not Currently     Comment: quick     Drug use: Not Currently     Types: Marijuana     Comment: quit     Sexual activity: Yes     Partners: Female   Other Topics Concern    Not on file   Social History Narrative    ** Merged History Encounter **          Social Determinants of Health     Financial Resource Strain: Not on file   Food Insecurity: Not on file   Transportation Needs: Not on file   Physical Activity: Not on file   Stress: Not on file   Social Connections: Not on file   Intimate Partner Violence: Not on file   Housing Stability: Not on file         ALLERGIES: Codeine, Dilaudid [hydromorphone (bulk)], and Morphine    Review of Systems   Constitutional:  Negative for chills, diaphoresis and fever. HENT:  Negative for facial swelling, mouth sores, nosebleeds, trouble swallowing and voice change. Eyes:  Negative for pain and visual disturbance. Respiratory:  Negative for apnea, cough, shortness of breath, wheezing and stridor. Cardiovascular:  Negative for chest pain, palpitations and leg swelling. Gastrointestinal:  Negative for abdominal distention, abdominal pain, blood in stool, diarrhea, nausea and vomiting. Genitourinary:  Negative for difficulty urinating, dysuria, flank pain, hematuria, scrotal swelling and testicular pain. Musculoskeletal:  Negative for joint swelling. Skin:  Negative for color change and rash. Allergic/Immunologic: Negative for immunocompromised state. Neurological:  Positive for speech difficulty, numbness and headaches. Negative for dizziness, syncope and light-headedness. Hematological:  Does not bruise/bleed easily. Psychiatric/Behavioral:  Negative for agitation and behavioral problems. Vitals:    08/04/22 1000 08/04/22 1015 08/04/22 1200 08/04/22 1406   BP:  120/75  117/82   Pulse: (!) 56 (!) 55 66 (!) 59   Resp:  16  13   Temp:  98 °F (36.7 °C)  97.3 °F (36.3 °C)   SpO2:    97%   Weight:       Height:                Physical Exam  Vitals and nursing note reviewed. Constitutional:       General: He is not in acute distress. Appearance: Normal appearance.  He is not ill-appearing or toxic-appearing. HENT:      Head: Normocephalic and atraumatic. Right Ear: External ear normal.      Left Ear: External ear normal.      Nose: Nose normal.      Mouth/Throat:      Mouth: Mucous membranes are moist.      Pharynx: Oropharynx is clear. No oropharyngeal exudate or posterior oropharyngeal erythema. Eyes:      General: No scleral icterus. Extraocular Movements: Extraocular movements intact. Conjunctiva/sclera: Conjunctivae normal.      Pupils: Pupils are equal, round, and reactive to light. Cardiovascular:      Rate and Rhythm: Normal rate and regular rhythm. Pulses: Normal pulses. Heart sounds: No murmur heard. No friction rub. No gallop. Pulmonary:      Effort: Pulmonary effort is normal. No respiratory distress. Breath sounds: Normal breath sounds. No stridor. No wheezing, rhonchi or rales. Abdominal:      General: Bowel sounds are normal. There is no distension. Palpations: Abdomen is soft. Tenderness: There is no abdominal tenderness. There is no guarding or rebound. Musculoskeletal:         General: No deformity. Normal range of motion. Cervical back: Normal range of motion and neck supple. No rigidity. Right lower leg: No edema. Left lower leg: No edema. Skin:     General: Skin is warm. Capillary Refill: Capillary refill takes less than 2 seconds. Coloration: Skin is not jaundiced. Neurological:      Mental Status: He is alert. Cranial Nerves: No cranial nerve deficit. Sensory: No sensory deficit. Motor: No weakness.       Coordination: Coordination normal.      Comments: -GCS15, Aaox3  +LUE and LLE 4+/5 strength w/ slight drift compared to right  -Normal b/l UE/LE sensory exam  -CN2-12 intact including able to smile/frown, elevate eyebrows symmetrically, close eyes tightly against force, sensation to light touch intact V1-V3 distribution, hearing intact to finger rub b/l, palate/uvula elevate midline/symmetrically, able to shrug shoulders and move head left/right against force, tongue protrudes midline  -EOMI, PERRL, no nystagmus, normal visual fields, nl speech w/o dysarthria/aphasia     Psychiatric:         Mood and Affect: Mood normal.         Behavior: Behavior normal.         Thought Content: Thought content normal.         Judgment: Judgment normal.      EKG Interpretation   SR, narrow QRS, nl intervals, no ILAN/STD/TWI  (EKG tracing interpreted by ED physician)    LABORATORY TESTS:  Admission on 08/03/2022   Component Date Value Ref Range Status    WBC 08/03/2022 7.3  4.1 - 11.1 K/uL Final    RBC 08/03/2022 4.16  4.10 - 5.70 M/uL Final    HGB 08/03/2022 13.3  12.1 - 17.0 g/dL Final    HCT 08/03/2022 37.4  36.6 - 50.3 % Final    MCV 08/03/2022 89.9  80.0 - 99.0 FL Final    MCH 08/03/2022 32.0  26.0 - 34.0 PG Final    MCHC 08/03/2022 35.6  30.0 - 36.5 g/dL Final    RDW 08/03/2022 13.1  11.5 - 14.5 % Final    PLATELET 53/10/3406 857 (A) 150 - 400 K/uL Final    MPV 08/03/2022 10.0  8.9 - 12.9 FL Final    NRBC 08/03/2022 0.0  0  WBC Final    ABSOLUTE NRBC 08/03/2022 0.00  0.00 - 0.01 K/uL Final    NEUTROPHILS 08/03/2022 55  32 - 75 % Final    LYMPHOCYTES 08/03/2022 32  12 - 49 % Final    MONOCYTES 08/03/2022 9  5 - 13 % Final    EOSINOPHILS 08/03/2022 3  0 - 7 % Final    BASOPHILS 08/03/2022 0  0 - 1 % Final    IMMATURE GRANULOCYTES 08/03/2022 1 (A) 0.0 - 0.5 % Final    ABS. NEUTROPHILS 08/03/2022 4.0  1.8 - 8.0 K/UL Final    ABS. LYMPHOCYTES 08/03/2022 2.4  0.8 - 3.5 K/UL Final    ABS. MONOCYTES 08/03/2022 0.7  0.0 - 1.0 K/UL Final    ABS. EOSINOPHILS 08/03/2022 0.2  0.0 - 0.4 K/UL Final    ABS. BASOPHILS 08/03/2022 0.0  0.0 - 0.1 K/UL Final    ABS. IMM.  GRANS. 08/03/2022 0.1 (A) 0.00 - 0.04 K/UL Final    DF 08/03/2022 AUTOMATED    Final    INR 08/03/2022 1.1  0.9 - 1.1   Final    A single therapeutic range for Vit K antagonists may not be optimal for all indications - see June, 2008 issue of Chest, American College of Chest Physicians Evidence-Based Clinical Practice Guidelines, 8th Edition. Prothrombin time 08/03/2022 11.2 (A) 9.0 - 11.1 sec Final    Sodium 08/03/2022 132 (A) 136 - 145 mmol/L Final    Potassium 08/03/2022 3.8  3.5 - 5.1 mmol/L Final    Chloride 08/03/2022 100  97 - 108 mmol/L Final    CO2 08/03/2022 24  21 - 32 mmol/L Final    Anion gap 08/03/2022 8  5 - 15 mmol/L Final    Glucose 08/03/2022 108 (A) 65 - 100 mg/dL Final    BUN 08/03/2022 9  6 - 20 MG/DL Final    Creatinine 08/03/2022 0.82  0.70 - 1.30 MG/DL Final    BUN/Creatinine ratio 08/03/2022 11 (A) 12 - 20   Final    GFR est AA 08/03/2022 >60  >60 ml/min/1.73m2 Final    GFR est non-AA 08/03/2022 >60  >60 ml/min/1.73m2 Final    Estimated GFR is calculated using the IDMS-traceable Modification of Diet in Renal Disease (MDRD) Study equation, reported for both  Americans (GFRAA) and non- Americans (GFRNA), and normalized to 1.73m2 body surface area. The physician must decide which value applies to the patient. Calcium 08/03/2022 8.7  8.5 - 10.1 MG/DL Final    SAMPLES BEING HELD 08/03/2022 1 RED 1UA   Final    COMMENT 08/03/2022 Add-on orders for these samples will be processed based on acceptable specimen integrity and analyte stability, which may vary by analyte. Final    Urine culture hold 08/03/2022 Urine on hold in Microbiology dept for 2 days. If unpreserved urine is submitted, it cannot be used for addtional testing after 24 hours, recollection will be required.     Final    Ventricular Rate 08/03/2022 82  BPM Final    Atrial Rate 08/03/2022 82  BPM Final    P-R Interval 08/03/2022 262  ms Final    QRS Duration 08/03/2022 84  ms Final    Q-T Interval 08/03/2022 390  ms Final    QTC Calculation (Bezet) 08/03/2022 455  ms Final    Calculated P Axis 08/03/2022 53  degrees Final    Calculated R Axis 08/03/2022 56  degrees Final    Calculated T Axis 08/03/2022 59  degrees Final    Diagnosis 08/03/2022    Final Value: Sinus rhythm with 1st degree AV block  Otherwise normal ECG  When compared with ECG of 09-MAY-2022 19:51,  No significant change was found  Confirmed by Ramírez Farooq M.D., Drucie Pho ([id number]) on 8/4/2022 9:15:54 AM      IVSd 08/04/2022 0.8  0.6 - 1.0 cm Final    LVIDd 08/04/2022 4.1 (A) 4.2 - 5.9 cm Final    LVIDs 08/04/2022 2.9  cm Final    LVOT Diameter 08/04/2022 3.1  cm Final    LVPWd 08/04/2022 0.8  0.6 - 1.0 cm Final    EF BP 08/04/2022 58  55 - 100 % Final    LV Ejection Fraction A2C 08/04/2022 75  % Final    LV Ejection Fraction A4C 08/04/2022 32  % Final    LV EDV A2C 08/04/2022 91  mL Final    LV EDV A4C 08/04/2022 95  mL Final    LV EDV BP 08/04/2022 94  67 - 155 mL Final    LV ESV A2C 08/04/2022 22  mL Final    LV ESV A4C 08/04/2022 65  mL Final    LV ESV BP 08/04/2022 39  22 - 58 mL Final    LVOT Peak Gradient 08/04/2022 3  mmHg Final    LVOT Peak Gradient 08/04/2022 4  mmHg Final    LVOT Mean Gradient 08/04/2022 2  mmHg Final    LVOT SV 08/04/2022 163.7  ml Final    LVOT Peak Velocity 08/04/2022 1.0  m/s Final    LVOT Peak Velocity 08/04/2022 0.9  m/s Final    LVOT VTI 08/04/2022 21.7  cm Final    RV Free Wall Peak S' 08/04/2022 14  cm/s Final    LA Diameter 08/04/2022 4.0  cm Final    AV Area by Peak Velocity 08/04/2022 5.6  cm2 Final    AV Area by Peak Velocity 08/04/2022 5.9  cm2 Final    AV Peak Gradient 08/04/2022 6  mmHg Final    AV Peak Velocity 08/04/2022 1.2  m/s Final    MV A Velocity 08/04/2022 0.78  m/s Final    MV E Velocity 08/04/2022 1.10  m/s Final    LV E' Lateral Velocity 08/04/2022 7  cm/s Final    LV E' Septal Velocity 08/04/2022 10  cm/s Final    MR Peak Gradient 08/04/2022 4  mmHg Final    MR Peak Velocity 08/04/2022 1.0  m/s Final    TAPSE 08/04/2022 2.3  1.7 cm Final    TR Peak Gradient 08/04/2022 5  mmHg Final    TR Max Velocity 08/04/2022 1.17  m/s Final    Fractional Shortening 2D 08/04/2022 29  28 - 44 % Final    LV ESV Index BP 08/04/2022 16  mL/m2 Final LV EDV Index BP 08/04/2022 40  mL/m2 Final    LV ESV Index A4C 08/04/2022 27  mL/m2 Final    LV EDV Index A4C 08/04/2022 40  mL/m2 Final    LV ESV Index A2C 08/04/2022 9  mL/m2 Final    LV EDV Index A2C 08/04/2022 38  mL/m2 Final    LVIDd Index 08/04/2022 1.73  cm/m2 Final    LVIDs Index 08/04/2022 1.22  cm/m2 Final    LV RWT Ratio 08/04/2022 0.39   Final    LV Mass 2D 08/04/2022 97.3  88 - 224 g Final    LV Mass 2D Index 08/04/2022 41.1 (A) 49 - 115 g/m2 Final    MV E/A 08/04/2022 1.41   Final    E/E' Ratio (Averaged) 08/04/2022 13.36   Final    E/E' Lateral 08/04/2022 15.71   Final    E/E' Septal 08/04/2022 11.00   Final    LVOT Stroke Volume Index 08/04/2022 69.1  mL/m2 Final    LVOT Area 08/04/2022 7.5  cm2 Final    LA Size Index 08/04/2022 1.69  cm/m2 Final    Cholesterol, total 08/04/2022 134  <200 MG/DL Final    Triglyceride 08/04/2022 50  <150 MG/DL Final    Based on NCEP-ATP III:  Triglycerides <150 mg/dL  is considered normal, 150-199 mg/dL  borderline high,  200-499 mg/dL high and  greater than or equal to 500 mg/dL very high. HDL Cholesterol 08/04/2022 54  MG/DL Final    Based on NCEP ATP III, HDL Cholesterol <40 mg/dL is considered low and >60 mg/dL is elevated.     LDL, calculated 08/04/2022 70  0 - 100 MG/DL Final    Comment: Based on the NCEP-ATP: LDL-C concentrations are considered  optimal <100 mg/dL,  near optimal/above Normal 100-129 mg/dL  Borderline High: 130-159, High: 160-189 mg/dL  Very High: Greater than or equal to 190 mg/dL      VLDL, calculated 08/04/2022 10  MG/DL Final    CHOL/HDL Ratio 08/04/2022 2.5  0.0 - 5.0   Final    Hemoglobin A1c 08/04/2022 5.4  4.0 - 5.6 % Final    Comment: NEW METHOD  PLEASE NOTE NEW REFERENCE RANGE  (NOTE)  HbA1C Interpretive Ranges  <5.7              Normal  5.7 - 6.4         Consider Prediabetes  >6.5              Consider Diabetes      Est. average glucose 08/04/2022 108  mg/dL Final    WBC 08/04/2022 5.9  4.1 - 11.1 K/uL Final    RBC 08/04/2022 4.56 4.10 - 5.70 M/uL Final    HGB 08/04/2022 14.3  12.1 - 17.0 g/dL Final    HCT 08/04/2022 41.8  36.6 - 50.3 % Final    MCV 08/04/2022 91.7  80.0 - 99.0 FL Final    MCH 08/04/2022 31.4  26.0 - 34.0 PG Final    MCHC 08/04/2022 34.2  30.0 - 36.5 g/dL Final    RDW 08/04/2022 13.2  11.5 - 14.5 % Final    PLATELET 54/97/1196 605 (A) 150 - 400 K/uL Final    MPV 08/04/2022 10.2  8.9 - 12.9 FL Final    NRBC 08/04/2022 0.0  0  WBC Final    ABSOLUTE NRBC 08/04/2022 0.00  0.00 - 0.01 K/uL Final       IMAGING RESULTS:  MRI BRAIN WO CONT   Final Result   Normal MRI of the main for patient age. There is no intracranial mass, hemorrhage or evidence of acute infarction. No acute intracranial process is demonstrated. CTA CODE NEURO HEAD AND NECK W CONT   Final Result   No acute vascular abnormality, no large vessel occlusion. No hemodynamically   significant stenosis, normal perfusion. CT CODE NEURO PERF W CBF   Final Result   No acute vascular abnormality, no large vessel occlusion. No hemodynamically   significant stenosis, normal perfusion. CT CODE NEURO HEAD WO CONTRAST   Final Result   No evidence of acute process.                 MEDICATIONS GIVEN:  Medications   acetaminophen (TYLENOL) tablet 650 mg (has no administration in time range)     Or   acetaminophen (TYLENOL) solution 650 mg (has no administration in time range)     Or   acetaminophen (TYLENOL) suppository 650 mg (has no administration in time range)   0.9% sodium chloride infusion (75 mL/hr IntraVENous New Bag 8/3/22 2124)   ondansetron (ZOFRAN) injection 4 mg (has no administration in time range)   aspirin chewable tablet 81 mg (81 mg Oral Given 8/4/22 0948)   atorvastatin (LIPITOR) tablet 80 mg (80 mg Oral Given 8/3/22 2124)   famotidine (PEPCID) tablet 20 mg (20 mg Oral Given 8/4/22 0948)   butalbital-acetaminophen-caffeine (FIORICET, ESGIC) -40 mg per tablet 1 Tablet (has no administration in time range)   ketorolac (TORADOL) injection 15 mg (has no administration in time range)   iopamidoL (ISOVUE-370) 76 % injection 100 mL (100 mL IntraVENous Given 8/3/22 1611)   iopamidoL (ISOVUE-370) 76 % injection 20 mL (20 mL IntraVENous Given 8/3/22 1611)   metoclopramide HCl (REGLAN) injection 10 mg (10 mg IntraVENous Given 8/3/22 1722)   aspirin chewable tablet 324 mg (324 mg Oral Given 8/3/22 1723)   acetaminophen (TYLENOL) tablet 1,000 mg (1,000 mg Oral Given 8/3/22 1723)   sodium chloride 0.9 % bolus infusion 1,000 mL (0 mL IntraVENous IV Completed 8/3/22 1822)   magnesium sulfate 2 g/50 ml IVPB (premix or compounded) (2 g IntraVENous New Bag 8/4/22 1218)       PROGRESS NOTE:   The patient's ED course has been uncomplicated    CONSULT:  Neurology: 4:02 PM Pt is not a TPA candidate and not a neuro-interventional candidate    IMPRESSION:  1. Left sided numbness    2. Episode of change in speech    3. Generalized headache        PLAN:  - Admit to hospitalist    Juana Mena MD      MDM  Number of Diagnoses or Management Options  Episode of change in speech  Generalized headache  Left sided numbness  Diagnosis management comments: 57M w/ hx HTN, HLD, smoking p/w 1hr facial numbness and speech changes. Pt nontoxic, hemodynamically stable w/o hypoxia and BS is WNL. Some deficits have improved prior to arrival. Level 1 CVA alert called. NIHSS=2 on my exam. Ddx includes CVA (ischemic vs hemorrhagic) vs cerebellar/brainstem stroke vs carotid/vertebral artery occlusion/dissection vs SAH vs ICH/IPH vs SDH/epidural vs HTN emergency/urgency vs PRES vs obstructive hydrocephalus vs intracranial mass vs cerebral edema vs neuromuscular/neurodegenerative disease vs partial/focal seizure vs complex migraine vs syncope vs electrolyte/metabolic vs CNS infection. Ordered CTH, CTA head/neck, CTP. Also EKG and labs. Stat tele neuro consult. Closely reassess. [address] Pt seen and evaluate by tele neurology (Dr Emmanuel Ma) and symptoms continue to improve.  Pt given reglan and IVF for HA. CTH neg for acute findings. CTA neg for LVO, aneurysm, dissection or critical stenosis and CTP neg for perfusion defect. Case d/w neurology who agrees that given low NIHSS and improving symptoms, pt is NOT a TPA candidate (and not neuro-interventional candidate based on CTA results). EKG SR w/o ischemic changes. Labs unremarkable. Gave full dose asa. Admit to tele for stroke w/u.        Amount and/or Complexity of Data Reviewed  Clinical lab tests: ordered and reviewed  Tests in the radiology section of CPT®: ordered and reviewed  Tests in the medicine section of CPT®: ordered and reviewed  Decide to obtain previous medical records or to obtain history from someone other than the patient: yes  Review and summarize past medical records: yes  Discuss the patient with other providers: yes  Independent visualization of images, tracings, or specimens: yes    Risk of Complications, Morbidity, and/or Mortality  Presenting problems: high  Diagnostic procedures: high  Management options: high           Critical Care    Date/Time: 8/4/2022 3:01 PM  Performed by: Vera Martinez MD  Authorized by: Vera Martinez MD     Critical care provider statement:     Critical care time (minutes):  56    Critical care was necessary to treat or prevent imminent or life-threatening deterioration of the following conditions:  CNS failure or compromise    Critical care was time spent personally by me on the following activities:  Development of treatment plan with patient or surrogate, discussions with consultants, discussions with primary provider, evaluation of patient's response to treatment, examination of patient, interpretation of cardiac output measurements, obtaining history from patient or surrogate, re-evaluation of patient's condition, pulse oximetry, ordering and review of radiographic studies, ordering and review of laboratory studies and ordering and performing treatments and interventions    Total critical care time (not including time spent performing separately reportable procedures): 46        [address] for Admission  5:30 PM    ED Room Number: 673/01  Patient Name and age:  Rubi Bustamante 59 y.o.  male  Working Diagnosis:   1.  Left sided numbness    2. Episode of change in speech    3. Generalized headache        COVID-19 Suspicion:  no  Sepsis present:  no  Reassessment needed: no  Code Status:  Full Code  Readmission: no  Isolation Requirements:  no  Recommended Level of Care:  telemetry  Department:[facility] Adult ED - 21

## 2022-08-03 NOTE — ED TRIAGE NOTES
TRIAGE NOTE:   Patient arrives by EMS from home as CODE STROKE level 1. Patient reports left facial numbness and slurred speech. Patient also reports Left frontal headache. LKW 1400. Patient transported to CT. ED MD, and neuro interventional NP in CT evaluating patient. Patient reports history of TIA.

## 2022-08-03 NOTE — PROGRESS NOTES
Neurocritical Care Code Stroke Documentation      Symptoms:   Headache, Left facial numbness, slurred speech, left-sided weakness    Last Known Well: 1400 pm today    Medical hx: Per review of chart, patient had a recent admission in May 2022 with similar presentation of left-sided numbness. MRI was negative for an acute stroke at that time and patient was diagnosed with a TIA. Past Medical History:   Diagnosis Date    Cholelithiasis 2011    High cholesterol     Hypertension     Left sided numbness 2022    Other ill-defined conditions(799.89)     hypercholesterolemia    Paresthesia 2022    Psychiatric disorder     anxiety    Seasonal allergies     Status post laparoscopic cholecystectomy 11      Anticoagulation: On 81 mg of aspirin daily    VAN:   Negative   NIHSS:   1a-LOC:0    1b-Month/Age:0    1c-Open/Close Hand:0    2-Best Gaze:0    3-Visual Fields:0    4-Facial Palsy:0    5a-Left Arm:0    5b-Right Arm:0    6a-Left Le    6b-Right Le    7-Limb Ataxia:0    8-Sensory:1 (left facial numbness)    9-Best Language:0    10-Dysarthria:0    11-Extinction/Inattention:0  TOTAL SCORE:2   Imaging:   CT: No evidence of acute process. CTA/CTP:    CT Perfusion:  Essentially normal perfusion. Tiny focus of delayed perfusion in the right  temporal lobe is likely artifactual.. IMPRESSION  No acute vascular abnormality, no large vessel occlusion. No hemodynamically  significant stenosis, normal perfusion. Plan:   TNK Candidate: NO, symptoms appear to be improving     Mechanical thrombectomy Candidate: NO     Discussed with Dr. Lalitha Sherman. Arrival time: 1536  Time spent: 30 minutes.      Rex Bello NP  Neurocritical Care Nurse Practitioner

## 2022-08-03 NOTE — PROGRESS NOTES
Admission Medication Reconciliation:    Information obtained from:  Patient, [address]:  YES    Comments/Recommendations: Updated PTA meds/reviewed patient's allergies. 1)  Patient was a questionable historian. Pt states he only fills his rx at [facility]. He states he is taking escitalopram 20 mg bid. Upon speaking to [facility], escitalopram has not been filled there but that he had picked up fluoxetine 40 mg po bid on 5/19 for 90 d/s. 2)  Medication changes (since last review): Added  - amlodipine 5 mg po qhs  - carvedilol 6.25 mg po bid  - montelukast 10 mg po daily  - zolpidem 10 mg po qhs prn  - proair inhaler 1 puff q6h prn  - symbicort inhaler 1 puff bid (pt unsure of dose)    Adjusted  - aripiprazole 20 mg po daily --> 15 mg po daily  - clonazepam 1 mg po bid --> 2 mg po bid  - fluoxetine 40 mg po daily --> 40 mg po bid  - gabapentin 300 mg bid --> 900 mg bid      Removed  - acetaminophen  - naproxen    3)  [facility] confirmed that their records showed pt switching from rosuvastatin 20 mg po daily to atorvastatin 40 mg po qhs which was picked up on 5/6 for 90 D/S.    4) Pt also advised that he uses his symbicort inhaler 1 puff bid. [facility] confirmed that this rx was last filled on 7/30/21. ¹[employer] pharmacy benefit data reflects medications filled and processed through the patient's insurance, however   this data does NOT capture whether the medication was picked up or is currently being taken by the patient.     Allergies:  Codeine, Dilaudid [hydromorphone (bulk)], and Morphine    Significant PMH/Disease States:   Past Medical History:   Diagnosis Date    Cholelithiasis 8/22/2011    High cholesterol     Hypertension     Left sided numbness 5/5/2022    Other ill-defined conditions(799.89)     hypercholesterolemia    Paresthesia 5/4/2022    Psychiatric disorder     anxiety    Seasonal allergies     Status post laparoscopic cholecystectomy 8/25/11     Chief Complaint for this Admission:  No chief complaint on file. Prior to Admission Medications:   Prior to Admission Medications   Prescriptions Last Dose Informant Taking? ARIPiprazole (ABILIFY) 15 mg tablet 8/2/2022  Yes   Sig: Take 15 mg by mouth every evening. FLUoxetine (PROzac) 40 mg capsule   Yes   Sig: Take 40 mg by mouth two (2) times a day. albuterol (ProAir HFA) 90 mcg/actuation inhaler   Yes   Sig: Take 1 Puff by inhalation every six (6) hours as needed for Wheezing. amLODIPine (NORVASC) 5 mg tablet 8/2/2022  Yes   Sig: Take 5 mg by mouth every evening. aspirin 81 mg chewable tablet 8/3/2022  Yes   Sig: Take 1 Tablet by mouth daily. atorvastatin (LIPITOR) 40 mg tablet 8/2/2022  Yes   Sig: Take 1 Tablet by mouth nightly. budesonide/formoterol fumarate (SYMBICORT IN)   Yes   Sig: Take 1 Puff by inhalation two (2) times a day. carvediloL (COREG) 6.25 mg tablet 8/3/2022  Yes   Sig: Take 6.25 mg by mouth two (2) times daily (with meals). clonazePAM (KlonoPIN) 2 mg tablet 8/3/2022  Yes   Sig: Take  by mouth two (2) times a day. escitalopram (LEXAPRO) 20 mg tablet   No   Sig: Take 40 mg by mouth daily. gabapentin (NEURONTIN) 300 mg capsule 8/3/2022  Yes   Sig: Take 900 mg by mouth two (2) times a day. lisinopril-hydrochlorothiazide (PRINZIDE, ZESTORETIC) 20-12.5 mg per tablet 8/2/2022  Yes   Sig: Take 1 Tablet by mouth every evening. 1 tablet daily for 30 days per prescription   meclizine (ANTIVERT) 25 mg tablet   Yes   Sig: Take  by mouth three (3) times daily as needed. montelukast (SINGULAIR) 10 mg tablet   Yes   Sig: Take 10 mg by mouth in the morning. omega-3 fatty acids-vitamin e 1,000 mg cap 8/3/2022  Yes   Sig: Take 1 Capsule by mouth daily. zolpidem (AMBIEN) 10 mg tablet   Yes   Sig: Take  by mouth nightly as needed for Sleep.       Facility-Administered Medications: None     Please contact the main inpatient pharmacy with any questions or concerns at [phone] and we will direct you to the clinical pharmacist covering this patient's care while in-house.    Abril Biggs

## 2022-08-04 ENCOUNTER — APPOINTMENT (OUTPATIENT)
Dept: MRI IMAGING | Age: 57
DRG: 103 | End: 2022-08-04
Attending: FAMILY MEDICINE
Payer: MEDICARE

## 2022-08-04 ENCOUNTER — APPOINTMENT (OUTPATIENT)
Dept: NON INVASIVE DIAGNOSTICS | Age: 57
DRG: 103 | End: 2022-08-04
Attending: FAMILY MEDICINE
Payer: MEDICARE

## 2022-08-04 VITALS
WEIGHT: 263.67 LBS | DIASTOLIC BLOOD PRESSURE: 82 MMHG | BODY MASS INDEX: 36.91 KG/M2 | HEART RATE: 59 BPM | OXYGEN SATURATION: 97 % | TEMPERATURE: 97.3 F | RESPIRATION RATE: 13 BRPM | HEIGHT: 71 IN | SYSTOLIC BLOOD PRESSURE: 117 MMHG

## 2022-08-04 LAB
ATRIAL RATE: 82 BPM
CALCULATED P AXIS, ECG09: 53 DEGREES
CALCULATED R AXIS, ECG10: 56 DEGREES
CALCULATED T AXIS, ECG11: 59 DEGREES
CHOLEST SERPL-MCNC: 134 MG/DL
DIAGNOSIS, 93000: NORMAL
ECHO AV AREA PEAK VELOCITY: 5.6 CM2
ECHO AV AREA PEAK VELOCITY: 5.9 CM2
ECHO AV PEAK GRADIENT: 6 MMHG
ECHO AV PEAK VELOCITY: 1.2 M/S
ECHO LA DIAMETER INDEX: 1.69 CM/M2
ECHO LA DIAMETER: 4 CM
ECHO LV E' LATERAL VELOCITY: 7 CM/S
ECHO LV E' SEPTAL VELOCITY: 10 CM/S
ECHO LV EDV A2C: 91 ML
ECHO LV EDV A4C: 95 ML
ECHO LV EDV BP: 94 ML (ref 67–155)
ECHO LV EDV INDEX A4C: 40 ML/M2
ECHO LV EDV INDEX BP: 40 ML/M2
ECHO LV EDV NDEX A2C: 38 ML/M2
ECHO LV EJECTION FRACTION A2C: 75 %
ECHO LV EJECTION FRACTION A4C: 32 %
ECHO LV EJECTION FRACTION BIPLANE: 58 % (ref 55–100)
ECHO LV ESV A2C: 22 ML
ECHO LV ESV A4C: 65 ML
ECHO LV ESV BP: 39 ML (ref 22–58)
ECHO LV ESV INDEX A2C: 9 ML/M2
ECHO LV ESV INDEX A4C: 27 ML/M2
ECHO LV ESV INDEX BP: 16 ML/M2
ECHO LV FRACTIONAL SHORTENING: 29 % (ref 28–44)
ECHO LV INTERNAL DIMENSION DIASTOLE INDEX: 1.73 CM/M2
ECHO LV INTERNAL DIMENSION DIASTOLIC: 4.1 CM (ref 4.2–5.9)
ECHO LV INTERNAL DIMENSION SYSTOLIC INDEX: 1.22 CM/M2
ECHO LV INTERNAL DIMENSION SYSTOLIC: 2.9 CM
ECHO LV IVSD: 0.8 CM (ref 0.6–1)
ECHO LV MASS 2D: 97.3 G (ref 88–224)
ECHO LV MASS INDEX 2D: 41.1 G/M2 (ref 49–115)
ECHO LV POSTERIOR WALL DIASTOLIC: 0.8 CM (ref 0.6–1)
ECHO LV RELATIVE WALL THICKNESS RATIO: 0.39
ECHO LVOT AREA: 7.5 CM2
ECHO LVOT DIAM: 3.1 CM
ECHO LVOT MEAN GRADIENT: 2 MMHG
ECHO LVOT PEAK GRADIENT: 3 MMHG
ECHO LVOT PEAK GRADIENT: 4 MMHG
ECHO LVOT PEAK VELOCITY: 0.9 M/S
ECHO LVOT PEAK VELOCITY: 1 M/S
ECHO LVOT STROKE VOLUME INDEX: 69.1 ML/M2
ECHO LVOT SV: 163.7 ML
ECHO LVOT VTI: 21.7 CM
ECHO MV A VELOCITY: 0.78 M/S
ECHO MV E VELOCITY: 1.1 M/S
ECHO MV E/A RATIO: 1.41
ECHO MV E/E' LATERAL: 15.71
ECHO MV E/E' RATIO (AVERAGED): 13.36
ECHO MV E/E' SEPTAL: 11
ECHO MV REGURGITANT PEAK GRADIENT: 4 MMHG
ECHO MV REGURGITANT PEAK VELOCITY: 1 M/S
ECHO RV FREE WALL PEAK S': 14 CM/S
ECHO RV TAPSE: 2.3 CM (ref 1.7–?)
ECHO TV REGURGITANT MAX VELOCITY: 1.17 M/S
ECHO TV REGURGITANT PEAK GRADIENT: 5 MMHG
ERYTHROCYTE [DISTWIDTH] IN BLOOD BY AUTOMATED COUNT: 13.2 % (ref 11.5–14.5)
EST. AVERAGE GLUCOSE BLD GHB EST-MCNC: 108 MG/DL
HBA1C MFR BLD: 5.4 % (ref 4–5.6)
HCT VFR BLD AUTO: 41.8 % (ref 36.6–50.3)
HDLC SERPL-MCNC: 54 MG/DL
HDLC SERPL: 2.5 {RATIO} (ref 0–5)
HGB BLD-MCNC: 14.3 G/DL (ref 12.1–17)
LDLC SERPL CALC-MCNC: 70 MG/DL (ref 0–100)
MCH RBC QN AUTO: 31.4 PG (ref 26–34)
MCHC RBC AUTO-ENTMCNC: 34.2 G/DL (ref 30–36.5)
MCV RBC AUTO: 91.7 FL (ref 80–99)
NRBC # BLD: 0 K/UL (ref 0–0.01)
NRBC BLD-RTO: 0 PER 100 WBC
P-R INTERVAL, ECG05: 262 MS
PLATELET # BLD AUTO: 122 K/UL (ref 150–400)
PMV BLD AUTO: 10.2 FL (ref 8.9–12.9)
Q-T INTERVAL, ECG07: 390 MS
QRS DURATION, ECG06: 84 MS
QTC CALCULATION (BEZET), ECG08: 455 MS
RBC # BLD AUTO: 4.56 M/UL (ref 4.1–5.7)
TRIGL SERPL-MCNC: 50 MG/DL (ref ?–150)
VENTRICULAR RATE, ECG03: 82 BPM
VLDLC SERPL CALC-MCNC: 10 MG/DL
WBC # BLD AUTO: 5.9 K/UL (ref 4.1–11.1)

## 2022-08-04 PROCEDURE — 36415 COLL VENOUS BLD VENIPUNCTURE: CPT

## 2022-08-04 PROCEDURE — 74011250636 HC RX REV CODE- 250/636: Performed by: PSYCHIATRY & NEUROLOGY

## 2022-08-04 PROCEDURE — 85027 COMPLETE CBC AUTOMATED: CPT

## 2022-08-04 PROCEDURE — 83036 HEMOGLOBIN GLYCOSYLATED A1C: CPT

## 2022-08-04 PROCEDURE — 99222 1ST HOSP IP/OBS MODERATE 55: CPT | Performed by: PSYCHIATRY & NEUROLOGY

## 2022-08-04 PROCEDURE — 97165 OT EVAL LOW COMPLEX 30 MIN: CPT

## 2022-08-04 PROCEDURE — 97116 GAIT TRAINING THERAPY: CPT

## 2022-08-04 PROCEDURE — 92610 EVALUATE SWALLOWING FUNCTION: CPT

## 2022-08-04 PROCEDURE — 77030040361 HC SLV COMPR DVT MDII -B

## 2022-08-04 PROCEDURE — 97535 SELF CARE MNGMENT TRAINING: CPT

## 2022-08-04 PROCEDURE — 74011250637 HC RX REV CODE- 250/637: Performed by: FAMILY MEDICINE

## 2022-08-04 PROCEDURE — 92523 SPEECH SOUND LANG COMPREHEN: CPT

## 2022-08-04 PROCEDURE — 70551 MRI BRAIN STEM W/O DYE: CPT

## 2022-08-04 PROCEDURE — 96374 THER/PROPH/DIAG INJ IV PUSH: CPT

## 2022-08-04 PROCEDURE — 97161 PT EVAL LOW COMPLEX 20 MIN: CPT

## 2022-08-04 PROCEDURE — 80061 LIPID PANEL: CPT

## 2022-08-04 RX ORDER — MAGNESIUM SULFATE HEPTAHYDRATE 40 MG/ML
2 INJECTION, SOLUTION INTRAVENOUS ONCE
Status: COMPLETED | OUTPATIENT
Start: 2022-08-04 | End: 2022-08-04

## 2022-08-04 RX ORDER — AMLODIPINE BESYLATE 5 MG/1
5 TABLET ORAL DAILY
COMMUNITY

## 2022-08-04 RX ORDER — ESCITALOPRAM OXALATE 20 MG/1
20 TABLET ORAL DAILY
Status: ON HOLD | COMMUNITY
End: 2022-08-04

## 2022-08-04 RX ADMIN — MAGNESIUM SULFATE HEPTAHYDRATE 2 G: 40 INJECTION, SOLUTION INTRAVENOUS at 12:18

## 2022-08-04 RX ADMIN — ASPIRIN 81 MG CHEWABLE TABLET 81 MG: 81 TABLET CHEWABLE at 09:48

## 2022-08-04 RX ADMIN — FAMOTIDINE 20 MG: 20 TABLET, FILM COATED ORAL at 09:48

## 2022-08-04 NOTE — H&P
[address] [facility] Adult  Hospitalist Group  History and Physical    Date of Service:  8/3/2022  Primary Care Provider: Maame Ramachandran MD  Source of information: The patient    Chief Complaint: No chief complaint on file. History of Presenting Illness:   Meme Lopez is a 59 y.o. male who presents with facial numbness    Patient reports that he is tolerating a left-sided headache associated with facial numbness, reports that he felt that his speech was somewhat slurred, but cannot fully define that. Patient reports that he also had some nausea, felt \"weird\" called EMS and came to the ER, in the ER patient was deemed as a code stroke, and was requested to be admitted to the hospitalist service, patient denies any other complaints or problems    The patient denies any  blurry vision, sore throat, trouble swallowing, trouble with speech, chest pain, SOB, cough, fever, chills, N/V/D, abd pain, urinary symptoms, constipation, recent travels, sick contacts, , falls, injuries, rashes, contact with COVID-19 diagnosed patients, hematemesis, melena, hemoptysis, hematuria, rashes, denies starting any new medications and denies any other concerns or problems besides as mentioned above. REVIEW OF SYSTEMS:  A comprehensive review of systems was negative except for that written in the History of Present Illness. Past Medical History:   Diagnosis Date    Cholelithiasis 8/22/2011    High cholesterol     Hypertension     Left sided numbness 5/5/2022    Other ill-defined conditions(799.89)     hypercholesterolemia    Paresthesia 5/4/2022    Psychiatric disorder     anxiety    Seasonal allergies     Status post laparoscopic cholecystectomy 8/25/11      Past Surgical History:   Procedure Laterality Date    HX CHOLECYSTECTOMY      HX ORTHOPAEDIC      back srgery, arm surgery    [place] LAP,CHOLECYSTECTOMY  8/25/11     Prior to Admission medications    Medication Sig Start Date End Date Taking?  Authorizing Provider   ARIPiprazole (ABILIFY) 15 mg tablet Take 15 mg by mouth every evening. Yes Provider, Historical   clonazePAM (KlonoPIN) 2 mg tablet Take  by mouth two (2) times a day. Yes Provider, Historical   amLODIPine (NORVASC) 5 mg tablet Take 5 mg by mouth every evening. Yes Provider, Historical   carvediloL (COREG) 6.25 mg tablet Take 6.25 mg by mouth two (2) times daily (with meals). Yes Provider, Historical   montelukast (SINGULAIR) 10 mg tablet Take 10 mg by mouth in the morning. Yes Provider, Historical   zolpidem (AMBIEN) 10 mg tablet Take  by mouth nightly as needed for Sleep. Yes Provider, Historical   albuterol (ProAir HFA) 90 mcg/actuation inhaler Take 1 Puff by inhalation every six (6) hours as needed for Wheezing. Yes Provider, Historical   budesonide/formoterol fumarate (SYMBICORT IN) Take 1 Puff by inhalation two (2) times a day. Yes Provider, Historical   FLUoxetine (PROzac) 40 mg capsule Take 40 mg by mouth two (2) times a day. 5/1/22  Yes Provider, Historical   aspirin 81 mg chewable tablet Take 1 Tablet by mouth daily. 5/7/22  Yes Dorian Elizondo MD   atorvastatin (LIPITOR) 40 mg tablet Take 1 Tablet by mouth nightly. 5/6/22  Yes Dorian Elizondo MD   gabapentin (NEURONTIN) 300 mg capsule Take 900 mg by mouth two (2) times a day. Yes Provider, Historical   lisinopril-hydrochlorothiazide (PRINZIDE, ZESTORETIC) 20-12.5 mg per tablet Take 1 Tablet by mouth every evening. 1 tablet daily for 30 days per prescription   Yes Provider, Historical   meclizine (ANTIVERT) 25 mg tablet Take  by mouth three (3) times daily as needed. Yes Macho, MD Susanne   omega-3 fatty acids-vitamin e 1,000 mg cap Take 1 Capsule by mouth daily. Yes Macho, MD Susanne   escitalopram (LEXAPRO) 20 mg tablet Take 40 mg by mouth daily.     Macho, MD Susanne     Allergies   Allergen Reactions    Codeine Nausea and Vomiting    Dilaudid [Hydromorphone (Bulk)] Nausea and Vomiting     Caused N&V when given in the hospital Morphine Rash      Family History   Problem Relation Age of Onset    Heart Disease Mother     Headache Mother     Heart Disease Maternal Grandfather     Heart Disease Paternal Grandmother     Headache Sister     Headache Brother       Social History:  reports that he quit smoking about 2 years ago. He quit smokeless tobacco use about 21 years ago. He reports that he does not currently use alcohol. He reports that he does not currently use drugs after having used the following drugs: Marijuana. Family and social history were personally reviewed, all pertinent and relevant details are outlined as above. Objective:   Visit Vitals  /80 (BP 1 Location: Right upper arm, BP Patient Position: At rest)   Pulse (!) 58   Temp 97.6 °F (36.4 °C)   Resp 11   Wt 119.6 kg (263 lb 10.7 oz)   SpO2 95%   BMI 36.77 kg/m²      O2 Device: None (Room air)    PHYSICAL EXAM:   General: Alert x oriented x 3, awake,   HEENT: PEERL, EOMI, moist mucus membranes  Neck: Supple, no JVD, no meningeal signs  Chest: Clear to auscultation bilaterally   CVS: RRR, S1 S2 heard, no murmurs/rubs/gallops  Abd: Soft, non-tender, non-distended, +bowel sounds   Ext: No clubbing, no cyanosis, no edema  Neuro/Psych: Pleasant mood and affect, CN 2-12 grossly intact, sensory grossly within normal limit, Strength 5/5 in all extremities, DTR 1+ x 4  Cap refill: Brisk, less than 3 seconds  Pulses: 2+, symmetric in all extremities  Skin: Warm, dry, without rashes or lesions    Data Review: All diagnostic labs and studies have been reviewed. Abnormal Labs Reviewed   CBC WITH AUTOMATED DIFF - Abnormal; Notable for the following components:       Result Value    PLATELET 811 (*)     IMMATURE GRANULOCYTES 1 (*)     ABS. IMM.  GRANS. 0.1 (*)     All other components within normal limits   PROTHROMBIN TIME + INR - Abnormal; Notable for the following components:    Prothrombin time 11.2 (*)     All other components within normal limits   METABOLIC PANEL, BASIC - Abnormal; Notable for the following components:    Sodium 132 (*)     Glucose 108 (*)     BUN/Creatinine ratio 11 (*)     All other components within normal limits       All Micro Results       Procedure Component Value Units Date/Time    URINE CULTURE HOLD SAMPLE [[id number]] Collected: 08/03/22 1636    Order Status: Completed Specimen: Serum Updated: 08/03/22 1644     Urine culture hold       Urine on hold in Microbiology dept for 2 days. If unpreserved urine is submitted, it cannot be used for addtional testing after 24 hours, recollection will be required. IMAGING:   CTA CODE NEURO HEAD AND NECK W CONT   Final Result   No acute vascular abnormality, no large vessel occlusion. No hemodynamically   significant stenosis, normal perfusion. CT CODE NEURO PERF W CBF   Final Result   No acute vascular abnormality, no large vessel occlusion. No hemodynamically   significant stenosis, normal perfusion. CT CODE NEURO HEAD WO CONTRAST   Final Result   No evidence of acute process. MRI BRAIN WO CONT    (Results Pending)        ECG/ECHO:    Results for orders placed or performed during the hospital encounter of 08/03/22   EKG, 12 LEAD, INITIAL   Result Value Ref Range    Ventricular Rate 82 BPM    Atrial Rate 82 BPM    P-R Interval 262 ms    QRS Duration 84 ms    Q-T Interval 390 ms    QTC Calculation (Bezet) 455 ms    Calculated P Axis 53 degrees    Calculated R Axis 56 degrees    Calculated T Axis 59 degrees    Diagnosis       Sinus rhythm with 1st degree AV block  Otherwise normal ECG  When compared with ECG of 09-MAY-2022 19:51,  No significant change was found          Assessment:   Given the patient's current clinical presentation, there is a high level of concern for decompensation if discharged from the emergency department.  Complex decision making was performed, which includes reviewing the patient's available past medical records, laboratory results, and imaging studies. Left facial numbness  Headache  Hypertension  Hyperlipidemia    Plan:     Patient will be admitted on a neuro telemetry bed, concern for complex migraine, start patient on Fioricet and Toradol as needed, MRI of the brain, neurovascular checks, neurology consult, echocardiogram, PT OT consult, supportive care and further intervention per hospital course, continue to closely monitor on telemetry  Optimize blood pressure control  Continue statin        DIET: ADULT DIET Regular; 4 carb choices (60 gm/meal); Low Fat/Low Chol/High Fiber/COURT; Low Sodium (2 gm)   ISOLATION PRECAUTIONS: There are currently no Active Isolations  CODE STATUS: Full Code   DVT PROPHYLAXIS: SCDs  FUNCTIONAL STATUS PRIOR TO HOSPITALIZATION: Fully active and ambulatory; able to carry on all self-care without restriction. EARLY MOBILITY ASSESSMENT: Recommend an assessment from physical therapy and/or occupational therapy  ANTICIPATED DISCHARGE: 24-48 hours. Signed By: Jamie Spear MD     August 3, 2022         Please note that this dictation may have been completed with Dragon, the computer voice recognition software. Quite often unanticipated grammatical, syntax, homophones, and other interpretive errors are inadvertently transcribed by the computer software. Please disregard these errors. Please excuse any errors that have escaped final proofreading.

## 2022-08-04 NOTE — PROGRESS NOTES
Problem: Falls - Risk of  Goal: *Absence of Falls  Description: Document [place] Fall Risk and appropriate interventions in the flowsheet.   Outcome: Progressing Towards Goal  Note: Fall Risk Interventions:                                Problem: Patient Education: Go to Patient Education Activity  Goal: Patient/Family Education  Outcome: Progressing Towards Goal

## 2022-08-04 NOTE — DISCHARGE INSTRUCTIONS
Discharge Instructions       PATIENT ID: Ana Ospina  MRN: [id number]   YOB: 1965    DATE OF ADMISSION: [unfilled]    DATE OF DISCHARGE: 8/4/2022    PRIMARY CARE PROVIDER: @PCP@     ATTENDING PHYSICIAN: [unfilled]  DISCHARGING PROVIDER: Mildred Wadsworth MD    To contact this individual call [phone] and ask the  to page. If unavailable ask to be transferred the Adult Hospitalist Department. DISCHARGE DIAGNOSES Complex migraine    CONSULTATIONS: [unfilled]    PROCEDURES/SURGERIES: * No surgery found *    PENDING TEST RESULTS:   At the time of discharge the following test results are still pending: none    FOLLOW UP APPOINTMENTS:   [unfilled]     ADDITIONAL CARE RECOMMENDATIONS:   Follow up with PCP and neurologist    DIET: Regular Diet      ACTIVITY: Activity as tolerated    WOUND CARE: na    EQUIPMENT needed: na      Radiology:  CT Results (most recent):  Results from Hospital Encounter encounter on 08/03/22    CT CODE NEURO PERF W CBF    Narrative  CLINICAL HISTORY: Left arm/leg weakness, facial numbness    EXAMINATION:  CT ANGIOGRAPHY HEAD AND NECK, CT PERFUSION    COMPARISON: MR brain May 4, 2022    TECHNIQUE:  Following the uneventful administration of iodinated contrast  material, axial CT angiography of the head and neck was performed. Delayed axial  images through the head were also obtained. Coronal and sagittal reconstructions  were obtained. Manual postprocessing of images was performed. 3-D  Sagittal  maximal intensity projection images were obtained. 3-D Coronal maximal  intensity projections were obtained. CT brain perfusion was performed with  generation of hemodynamic maps of multiple parameters, including cerebral blood  flow, cerebral blood volume, mean transit time, and TMAX. CT dose reduction was  achieved through use of a standardized protocol tailored for this examination  and automatic exposure control for dose modulation.     This study was analyzed by the [employer] algorithm. FINDINGS:    DELAYED ENHANCEMENT HEAD CT  No intra or extra-axial mass or collection. Periventricular white matter disease  compatible with chronic small vessel ischemic change. Ventricles are normal in  size and configuration. The basal cisterns are patent. Dural venous sinuses are patent. No abnormal parenchymal or meningeal  enhancement. Mastoid air cells and paranasal sinuses are clear. CTA NECK:    Great vessels: Normal arch anatomy with the origins patent. Right subclavian artery: Patent    Left subclavian artery: Patent    Right common carotid artery: Patent    Left common carotid artery: Patent    Cervical right internal carotid artery: Patent with no significant stenosis by  NASCET criteria. Cervical left internal carotid artery: Patent with no significant stenosis by  NASCET criteria. Right vertebral artery: Patent    Left vertebral artery: Patent    The lung apices are clear. The thyroid is homogeneous. No cervical  lymphadenopathy. Measurements utilize NASCET criteria. CTA HEAD:    Right cavernous internal carotid artery: Patent    Left cavernous internal carotid artery: Patent    Anterior cerebral arteries: Patent    Anterior communicating artery: Patent    Right middle cerebral artery: Patent    Left middle cerebral artery: Patent    Posterior communicating arteries: Patent    Posterior cerebral arteries: Fetal origin bilaterally. Distal branches are  patent bilaterally. Basilar artery: Patent    Distal vertebral arteries: Patent    No evidence for intracranial aneurysm or hemodynamically significant stenosis. CT Perfusion:  Essentially normal perfusion. Tiny focus of delayed perfusion in the right  temporal lobe is likely artifactual..    Impression  No acute vascular abnormality, no large vessel occlusion. No hemodynamically  significant stenosis, normal perfusion. XR Results (maximum last 3):   Results from [place] encounter on 05/04/22    XR CHEST PORT    Narrative  INDICATION: Numbness. Portable AP views of the chest.    Direct comparison made to prior chest x-ray dated July 2021    Cardiomediastinal silhouette is stable. Lungs are clear bilaterally. Pleural  spaces are normal and there is no pneumothorax. Osseous structures are intact. Impression  No acute cardiopulmonary disease. Results from [place] encounter on 07/10/21    XR SPINE THORAC 3 V    Narrative  EXAM:  XR SPINE THORAC 3 V    INDICATION: Back Pain    COMPARISON: None. FINDINGS: AP, lateral and swimmers lateral views of the thoracic spine. There is normal alignment. No fracture or compression deformity. The disc  spaces are within normal limits. There is small marginal osteophytes anteriorly  and laterally most prominent at the inferior thoracic spine. Soft tissues appear  unremarkable. Shoulder arthroplasty prosthesis is incidentally noted. As are  diffusely osteopenic. Impression  No acute findings. XR SPINE CERV 4 OR 5 V    Narrative  INDICATION:  Neck Pain    Exam: AP, lateral, bilateral oblique, odontoid views of the cervical spine. FINDINGS: There is no acute fracture or subluxation. Prevertebral soft tissues  are within normal limits. Bones are diffusely demineralized. Anterior  osteophytes and intervertebral disc narrowing is noted at the C3-C4 and C4-C5  levels. Impression  No acute fracture or subluxation. CT Results (maximum last 3): Results from Hospital Encounter encounter on 08/03/22    CT CODE NEURO PERF W CBF    Narrative  CLINICAL HISTORY: Left arm/leg weakness, facial numbness    EXAMINATION:  CT ANGIOGRAPHY HEAD AND NECK, CT PERFUSION    COMPARISON: MR brain May 4, 2022    TECHNIQUE:  Following the uneventful administration of iodinated contrast  material, axial CT angiography of the head and neck was performed. Delayed axial  images through the head were also obtained.  Coronal and sagittal reconstructions  were obtained. Manual postprocessing of images was performed. 3-D  Sagittal  maximal intensity projection images were obtained. 3-D Coronal maximal  intensity projections were obtained. CT brain perfusion was performed with  generation of hemodynamic maps of multiple parameters, including cerebral blood  flow, cerebral blood volume, mean transit time, and TMAX. CT dose reduction was  achieved through use of a standardized protocol tailored for this examination  and automatic exposure control for dose modulation. This study was analyzed by the [address] ai algorithm. FINDINGS:    DELAYED ENHANCEMENT HEAD CT  No intra or extra-axial mass or collection. Periventricular white matter disease  compatible with chronic small vessel ischemic change. Ventricles are normal in  size and configuration. The basal cisterns are patent. Dural venous sinuses are patent. No abnormal parenchymal or meningeal  enhancement. Mastoid air cells and paranasal sinuses are clear. CTA NECK:    Great vessels: Normal arch anatomy with the origins patent. Right subclavian artery: Patent    Left subclavian artery: Patent    Right common carotid artery: Patent    Left common carotid artery: Patent    Cervical right internal carotid artery: Patent with no significant stenosis by  NASCET criteria. Cervical left internal carotid artery: Patent with no significant stenosis by  NASCET criteria. Right vertebral artery: Patent    Left vertebral artery: Patent    The lung apices are clear. The thyroid is homogeneous. No cervical  lymphadenopathy. Measurements utilize NASCET criteria.     CTA HEAD:    Right cavernous internal carotid artery: Patent    Left cavernous internal carotid artery: Patent    Anterior cerebral arteries: Patent    Anterior communicating artery: Patent    Right middle cerebral artery: Patent    Left middle cerebral artery: Patent    Posterior communicating arteries: Patent    Posterior cerebral arteries: Fetal origin bilaterally. Distal branches are  patent bilaterally. Basilar artery: Patent    Distal vertebral arteries: Patent    No evidence for intracranial aneurysm or hemodynamically significant stenosis. CT Perfusion:  Essentially normal perfusion. Tiny focus of delayed perfusion in the right  temporal lobe is likely artifactual..    Impression  No acute vascular abnormality, no large vessel occlusion. No hemodynamically  significant stenosis, normal perfusion. CTA CODE NEURO HEAD AND NECK W CONT    Narrative  CLINICAL HISTORY: Left arm/leg weakness, facial numbness    EXAMINATION:  CT ANGIOGRAPHY HEAD AND NECK, CT PERFUSION    COMPARISON: MR brain May 4, 2022    TECHNIQUE:  Following the uneventful administration of iodinated contrast  material, axial CT angiography of the head and neck was performed. Delayed axial  images through the head were also obtained. Coronal and sagittal reconstructions  were obtained. Manual postprocessing of images was performed. 3-D  Sagittal  maximal intensity projection images were obtained. 3-D Coronal maximal  intensity projections were obtained. CT brain perfusion was performed with  generation of hemodynamic maps of multiple parameters, including cerebral blood  flow, cerebral blood volume, mean transit time, and TMAX. CT dose reduction was  achieved through use of a standardized protocol tailored for this examination  and automatic exposure control for dose modulation. This study was analyzed by the [address] ai algorithm. FINDINGS:    DELAYED ENHANCEMENT HEAD CT  No intra or extra-axial mass or collection. Periventricular white matter disease  compatible with chronic small vessel ischemic change. Ventricles are normal in  size and configuration. The basal cisterns are patent. Dural venous sinuses are patent. No abnormal parenchymal or meningeal  enhancement. Mastoid air cells and paranasal sinuses are clear.     CTA NECK:    Great vessels: Normal arch anatomy with the origins patent. Right subclavian artery: Patent    Left subclavian artery: Patent    Right common carotid artery: Patent    Left common carotid artery: Patent    Cervical right internal carotid artery: Patent with no significant stenosis by  NASCET criteria. Cervical left internal carotid artery: Patent with no significant stenosis by  NASCET criteria. Right vertebral artery: Patent    Left vertebral artery: Patent    The lung apices are clear. The thyroid is homogeneous. No cervical  lymphadenopathy. Measurements utilize NASCET criteria. CTA HEAD:    Right cavernous internal carotid artery: Patent    Left cavernous internal carotid artery: Patent    Anterior cerebral arteries: Patent    Anterior communicating artery: Patent    Right middle cerebral artery: Patent    Left middle cerebral artery: Patent    Posterior communicating arteries: Patent    Posterior cerebral arteries: Fetal origin bilaterally. Distal branches are  patent bilaterally. Basilar artery: Patent    Distal vertebral arteries: Patent    No evidence for intracranial aneurysm or hemodynamically significant stenosis. CT Perfusion:  Essentially normal perfusion. Tiny focus of delayed perfusion in the right  temporal lobe is likely artifactual..    Impression  No acute vascular abnormality, no large vessel occlusion. No hemodynamically  significant stenosis, normal perfusion. CT CODE NEURO HEAD WO CONTRAST    Narrative  EXAM: CT CODE NEURO HEAD WO CONTRAST    INDICATION: Slurred Speech, left sided weakness    COMPARISON: MRI 5/4/2022. CONTRAST: None. TECHNIQUE: Unenhanced CT of the head was performed using 5 mm images. Brain and  bone windows were generated. Coronal and sagittal reformats. CT dose reduction  was achieved through use of a standardized protocol tailored for this  examination and automatic exposure control for dose modulation.     FINDINGS:  There is mild atrophy and compensatory dilatation of ventricles. There is mild  white matter disease likely to chronic small vessel ischemic disease. There is  no intracranial hemorrhage, extra-axial collection, or mass effect. The basilar  cisterns are open. No CT evidence of acute infarct. The bone windows demonstrate no abnormalities. The visualized portions of the  paranasal sinuses and mastoid air cells are clear. There is a 1.3 cm lipoma in  the left parietal scalp. Impression  No evidence of acute process. DISCHARGE MEDICATIONS:   See Medication Reconciliation Form    It is important that you take the medication exactly as they are prescribed. Keep your medication in the bottles provided by the pharmacist and keep a list of the medication names, dosages, and times to be taken in your wallet. Do not take other medications without consulting your doctor. NOTIFY YOUR PHYSICIAN FOR ANY OF THE FOLLOWING:   Fever over 101 degrees for 24 hours. Chest pain, shortness of breath, fever, chills, nausea, vomiting, diarrhea, change in mentation, falling, weakness, bleeding. Severe pain or pain not relieved by medications. Or, any other signs or symptoms that you may have questions about.       DISPOSITION:   x Home With:   OT  PT  [facility]  RN       SNF/Inpatient Rehab/LTAC    Independent/assisted living    Hospice    Other:           Signed:   Stephen Murillo MD  8/4/2022  1:37 PM

## 2022-08-04 NOTE — PROGRESS NOTES
Problem: Falls - Risk of  Goal: *Absence of Falls  Description: Document Kecia Richardson Fall Risk and appropriate interventions in the flowsheet.   8/4/2022 1443 by Terrie Brush  Outcome: Resolved/Met  8/4/2022 1443 by Terrie Brush  Note: Fall Risk Interventions:            Medication Interventions: Evaluate medications/consider consulting pharmacy, Patient to call before getting OOB, Teach patient to arise slowly, Utilize gait belt for transfers/ambulation    Elimination Interventions: Call light in reach, Elevated toilet seat, Patient to call for help with toileting needs, Stay With Me (per policy), Toilet paper/wipes in reach, Toileting schedule/hourly rounds, Urinal in reach              Problem: Patient Education: Go to Patient Education Activity  Goal: Patient/Family Education  Outcome: Resolved/Met     Problem: Discharge Planning  Goal: *Discharge to safe environment  Outcome: Resolved/Met  Goal: *Knowledge of medication management  Outcome: Resolved/Met  Goal: *Knowledge of discharge instructions  Outcome: Resolved/Met     Problem: Patient Education: Go to Patient Education Activity  Goal: Patient/Family Education  Outcome: Resolved/Met     Problem: Patient Education: Go to Patient Education Activity  Goal: Patient/Family Education  Outcome: Resolved/Met     Problem: TIA/CVA Stroke: 0-24 hours  Goal: Off Pathway (Use only if patient is Off Pathway)  Outcome: Resolved/Met  Goal: Activity/Safety  Outcome: Resolved/Met  Goal: Consults, if ordered  Outcome: Resolved/Met  Goal: Diagnostic Test/Procedures  Outcome: Resolved/Met  Goal: Nutrition/Diet  Outcome: Resolved/Met  Goal: Discharge Planning  Outcome: Resolved/Met  Goal: Medications  Outcome: Resolved/Met  Goal: Respiratory  Outcome: Resolved/Met  Goal: Treatments/Interventions/Procedures  Outcome: Resolved/Met  Goal: Minimize risk of bleeding post-thrombolytic infusion  Outcome: Resolved/Met  Goal: Monitor for complications post-thrombolytic infusion  Outcome: Resolved/Met  Goal: Psychosocial  Outcome: Resolved/Met  Goal: *Hemodynamically stable  Outcome: Resolved/Met  Goal: *Neurologically stable  Description: Absence of additional neurological deficits    Outcome: Resolved/Met  Goal: *Verbalizes anxiety and depression are reduced or absent  Outcome: Resolved/Met  Goal: *Absence of Signs of Aspiration on Current Diet  Outcome: Resolved/Met  Goal: *Absence of deep venous thrombosis signs and symptoms(Stroke Metric)  Outcome: Resolved/Met  Goal: *Ability to perform ADLs and demonstrates progressive mobility and function  Outcome: Resolved/Met  Goal: *Stroke education started(Stroke Metric)  Outcome: Resolved/Met  Goal: *Dysphagia screen performed(Stroke Metric)  Outcome: Resolved/Met  Goal: *Rehab consulted(Stroke Metric)  Outcome: Resolved/Met     Problem: TIA/CVA Stroke: Day 2 Until Discharge  Goal: Off Pathway (Use only if patient is Off Pathway)  Outcome: Resolved/Met  Goal: Activity/Safety  Outcome: Resolved/Met  Goal: Diagnostic Test/Procedures  Outcome: Resolved/Met  Goal: Nutrition/Diet  Outcome: Resolved/Met  Goal: Discharge Planning  Outcome: Resolved/Met  Goal: Medications  Outcome: Resolved/Met  Goal: Respiratory  Outcome: Resolved/Met  Goal: Treatments/Interventions/Procedures  Outcome: Resolved/Met  Goal: Psychosocial  Outcome: Resolved/Met  Goal: *Verbalizes anxiety and depression are reduced or absent  Outcome: Resolved/Met  Goal: *Absence of aspiration  Outcome: Resolved/Met  Goal: *Absence of deep venous thrombosis signs and symptoms(Stroke Metric)  Outcome: Resolved/Met  Goal: *Optimal pain control at patient's stated goal  Outcome: Resolved/Met  Goal: *Tolerating diet  Outcome: Resolved/Met  Goal: *Ability to perform ADLs and demonstrates progressive mobility and function  Outcome: Resolved/Met  Goal: *Stroke education continued(Stroke Metric)  Outcome: Resolved/Met     Problem: Ischemic Stroke: Discharge Outcomes  Goal: *Verbalizes anxiety and depression are reduced or absent  Outcome: Resolved/Met  Goal: *Verbalize understanding of risk factor modification(Stroke Metric)  Outcome: Resolved/Met  Goal: *Hemodynamically stable  Outcome: Resolved/Met  Goal: *Absence of aspiration pneumonia  Outcome: Resolved/Met  Goal: *Aware of needed dietary changes  Outcome: Resolved/Met  Goal: *Verbalize understanding of prescribed medications including anti-coagulants, anti-lipid, and/or anti-platelets(Stroke Metric)  Outcome: Resolved/Met  Goal: *Tolerating diet  Outcome: Resolved/Met  Goal: *Aware of follow-up diagnostics related to anticoagulants  Outcome: Resolved/Met  Goal: *Ability to perform ADLs and demonstrates progressive mobility and function  Outcome: Resolved/Met  Goal: *Absence of DVT(Stroke Metric)  Outcome: Resolved/Met  Goal: *Absence of aspiration  Outcome: Resolved/Met  Goal: *Optimal pain control at patient's stated goal  Outcome: Resolved/Met  Goal: *Home safety concerns addressed  Outcome: Resolved/Met  Goal: *Describes available resources and support systems  Outcome: Resolved/Met  Goal: *Verbalizes understanding of activation of EMS(911) for stroke symptoms(Stroke Metric)  Outcome: Resolved/Met  Goal: *Understands and describes signs and symptoms to report to providers(Stroke Metric)  Outcome: Resolved/Met  Goal: *Neurolgocially stable (absence of additional neurological deficits)  Outcome: Resolved/Met  Goal: *Verbalizes importance of follow-up with primary care physician(Stroke Metric)  Outcome: Resolved/Met  Goal: *Smoking cessation discussed,if applicable(Stroke Metric)  Outcome: Resolved/Met  Goal: *Depression screening completed(Stroke Metric)  Outcome: Resolved/Met

## 2022-08-04 NOTE — CONSULTS
INPATIENT NEUROLOGY CONSULTATION  8/4/2022     Consulted by: Sim Billings MD        Patient ID:  Jared Gonzalez  [id number]  59 y.o.  1965    CC: Numbness and headache    HPI    Beka Ordaz is a 64-year-old gentleman who presents with a headache throbbing on the left side that was accompanied with numbness of the left face around the mouth. He thought he had some difficulty saying clear words. He did not feel okay so he came to the ER. Emergent imaging all benign. Some question of artifact on perfusion. CTA is clear. MRI brain done also benign. No stroke. He had a very similar presentation in May of this year that had the addition of leg symptoms which did not occur this time. He is going through a lot of stressors right now with significant depression. This month is the anniversary of his son's passing. He feels back to his baseline now. When he had a headache he had nausea and light sensitivity. He has multiple family members who have migraine he tells me. Review of Systems   Eyes:  Positive for photophobia. Negative for double vision. Gastrointestinal:  Positive for nausea. Neurological:  Positive for sensory change, speech change and headaches. All other systems reviewed and are negative.     Past Medical History:   Diagnosis Date    Cholelithiasis 8/22/2011    High cholesterol     Hypertension     Left sided numbness 5/5/2022    Other ill-defined conditions(799.89)     hypercholesterolemia    Paresthesia 5/4/2022    Psychiatric disorder     anxiety    Seasonal allergies     Status post laparoscopic cholecystectomy 8/25/11     Family History   Problem Relation Age of Onset    Heart Disease Mother     Headache Mother     Heart Disease Maternal Grandfather     Heart Disease Paternal Grandmother     Headache Sister     Headache Brother      Social History     Socioeconomic History    Marital status:      Spouse name: Not on file    Number of children: Not on file    Years of education: Not on file    Highest education level: Not on file   Occupational History    Not on file   Tobacco Use    Smoking status: Former     Types: Cigarettes     Quit date: 2020     Years since quittin.2    Smokeless tobacco: Former     Quit date: 2001   Vaping Use    Vaping Use: Never used   Substance and Sexual Activity    Alcohol use: Not Currently     Comment: quick 2019    Drug use: Not Currently     Types: Marijuana     Comment: quit     Sexual activity: Yes     Partners: Female   Other Topics Concern    Not on file   Social History Narrative    ** Merged History Encounter **          Social Determinants of Health     Financial Resource Strain: Not on file   Food Insecurity: Not on file   Transportation Needs: Not on file   Physical Activity: Not on file   Stress: Not on file   Social Connections: Not on file   Intimate Partner Violence: Not on file   Housing Stability: Not on file     Current Facility-Administered Medications   Medication Dose Route Frequency    acetaminophen (TYLENOL) tablet 650 mg  650 mg Oral Q4H PRN    Or    acetaminophen (TYLENOL) solution 650 mg  650 mg Per NG tube Q4H PRN    Or    acetaminophen (TYLENOL) suppository 650 mg  650 mg Rectal Q4H PRN    0.9% sodium chloride infusion  75 mL/hr IntraVENous CONTINUOUS    ondansetron (ZOFRAN) injection 4 mg  4 mg IntraVENous Q6H PRN    aspirin chewable tablet 81 mg  81 mg Oral DAILY    atorvastatin (LIPITOR) tablet 80 mg  80 mg Oral QHS    famotidine (PEPCID) tablet 20 mg  20 mg Oral Q12H    butalbital-acetaminophen-caffeine (FIORICET, ESGIC) -40 mg per tablet 1 Tablet  1 Tablet Oral Q4H PRN    ketorolac (TORADOL) injection 15 mg  15 mg IntraVENous Q6H PRN     Allergies   Allergen Reactions    Codeine Nausea and Vomiting    Dilaudid [Hydromorphone (Bulk)] Nausea and Vomiting     Caused N&V when given in the hospital    Morphine Rash       Visit Vitals  /83   Pulse 69   Temp 97.9 °F (36.6 °C)   Resp 18   Ht 5' 11\" (1.803 m)   Wt 263 lb 10.7 oz (119.6 kg)   SpO2 99%   BMI 36.77 kg/m²     Physical Exam  Vitals and nursing note reviewed. Constitutional:       Appearance: Normal appearance. Cardiovascular:      Rate and Rhythm: Normal rate. Pulmonary:      Effort: Pulmonary effort is normal.   Neurological:      Mental Status: He is alert. Neurologic Exam     Mental Status   Age-appropriate gentleman awake alert. Follows commands well. Lying on his left side in bed. Pupils equal reactive symmetric, face is grossly symmetric tongue is midline speech is clear-baseline  Moving all extremities in bed spontaneously with purpose without ataxia  No abnormal movements  Sensation intact  Gait deferred        Lab Results   Component Value Date/Time    WBC 5.9 08/04/2022 04:10 AM    HGB 14.3 08/04/2022 04:10 AM    Hemoglobin (POC) 14.3 03/11/2012 07:34 PM    HCT 41.8 08/04/2022 04:10 AM    Hematocrit (POC) 42 03/11/2012 07:34 PM    PLATELET 748 (L) 57/90/0429 04:10 AM    MCV 91.7 08/04/2022 04:10 AM     Lab Results   Component Value Date/Time    Hemoglobin A1c 5.4 08/04/2022 04:10 AM    Hemoglobin A1c 5.2 05/05/2022 01:48 AM    Glucose 108 (H) 08/03/2022 04:36 PM    Glucose (POC) 96 05/06/2022 08:03 AM    LDL, calculated 70 08/04/2022 04:10 AM    Creatinine (POC) 0.9 03/11/2012 07:34 PM    Creatinine 0.82 08/03/2022 04:36 PM      Lab Results   Component Value Date/Time    Cholesterol, total 134 08/04/2022 04:10 AM    HDL Cholesterol 54 08/04/2022 04:10 AM    LDL, calculated 70 08/04/2022 04:10 AM    Triglyceride 50 08/04/2022 04:10 AM    CHOL/HDL Ratio 2.5 08/04/2022 04:10 AM     Lab Results   Component Value Date/Time    ALT (SGPT) 40 05/09/2022 08:00 PM    Alk.  phosphatase 89 05/09/2022 08:00 PM    Bilirubin, direct 0.2 07/13/2009 08:30 AM    Bilirubin, total 0.7 05/09/2022 08:00 PM    Albumin 3.5 05/09/2022 08:00 PM    Protein, total 6.8 05/09/2022 08:00 PM    INR 1.1 08/03/2022 04:36 PM    Prothrombin time 11.2 (H) 08/03/2022 04:36 PM    PLATELET 817 (L) 73/67/5486 04:10 AM        CT Results (maximum last 3): Results from Hospital Encounter encounter on 08/03/22    CT CODE NEURO PERF W CBF    Narrative  CLINICAL HISTORY: Left arm/leg weakness, facial numbness    EXAMINATION:  CT ANGIOGRAPHY HEAD AND NECK, CT PERFUSION    COMPARISON: MR brain May 4, 2022    TECHNIQUE:  Following the uneventful administration of iodinated contrast  material, axial CT angiography of the head and neck was performed. Delayed axial  images through the head were also obtained. Coronal and sagittal reconstructions  were obtained. Manual postprocessing of images was performed. 3-D  Sagittal  maximal intensity projection images were obtained. 3-D Coronal maximal  intensity projections were obtained. CT brain perfusion was performed with  generation of hemodynamic maps of multiple parameters, including cerebral blood  flow, cerebral blood volume, mean transit time, and TMAX. CT dose reduction was  achieved through use of a standardized protocol tailored for this examination  and automatic exposure control for dose modulation. This study was analyzed by the [address] ai algorithm. FINDINGS:    DELAYED ENHANCEMENT HEAD CT  No intra or extra-axial mass or collection. Periventricular white matter disease  compatible with chronic small vessel ischemic change. Ventricles are normal in  size and configuration. The basal cisterns are patent. Dural venous sinuses are patent. No abnormal parenchymal or meningeal  enhancement. Mastoid air cells and paranasal sinuses are clear. CTA NECK:    Great vessels: Normal arch anatomy with the origins patent. Right subclavian artery: Patent    Left subclavian artery: Patent    Right common carotid artery: Patent    Left common carotid artery: Patent    Cervical right internal carotid artery: Patent with no significant stenosis by  NASCET criteria.     Cervical left internal carotid artery: Patent with no significant stenosis by  NASCET criteria. Right vertebral artery: Patent    Left vertebral artery: Patent    The lung apices are clear. The thyroid is homogeneous. No cervical  lymphadenopathy. Measurements utilize NASCET criteria. CTA HEAD:    Right cavernous internal carotid artery: Patent    Left cavernous internal carotid artery: Patent    Anterior cerebral arteries: Patent    Anterior communicating artery: Patent    Right middle cerebral artery: Patent    Left middle cerebral artery: Patent    Posterior communicating arteries: Patent    Posterior cerebral arteries: Fetal origin bilaterally. Distal branches are  patent bilaterally. Basilar artery: Patent    Distal vertebral arteries: Patent    No evidence for intracranial aneurysm or hemodynamically significant stenosis. CT Perfusion:  Essentially normal perfusion. Tiny focus of delayed perfusion in the right  temporal lobe is likely artifactual..    Impression  No acute vascular abnormality, no large vessel occlusion. No hemodynamically  significant stenosis, normal perfusion. CTA CODE NEURO HEAD AND NECK W CONT    Narrative  CLINICAL HISTORY: Left arm/leg weakness, facial numbness    EXAMINATION:  CT ANGIOGRAPHY HEAD AND NECK, CT PERFUSION    COMPARISON: MR brain May 4, 2022    TECHNIQUE:  Following the uneventful administration of iodinated contrast  material, axial CT angiography of the head and neck was performed. Delayed axial  images through the head were also obtained. Coronal and sagittal reconstructions  were obtained. Manual postprocessing of images was performed. 3-D  Sagittal  maximal intensity projection images were obtained. 3-D Coronal maximal  intensity projections were obtained. CT brain perfusion was performed with  generation of hemodynamic maps of multiple parameters, including cerebral blood  flow, cerebral blood volume, mean transit time, and TMAX.  CT dose reduction was  achieved through use of a standardized protocol tailored for this examination  and automatic exposure control for dose modulation. This study was analyzed by the [address] ai algorithm. FINDINGS:    DELAYED ENHANCEMENT HEAD CT  No intra or extra-axial mass or collection. Periventricular white matter disease  compatible with chronic small vessel ischemic change. Ventricles are normal in  size and configuration. The basal cisterns are patent. Dural venous sinuses are patent. No abnormal parenchymal or meningeal  enhancement. Mastoid air cells and paranasal sinuses are clear. CTA NECK:    Great vessels: Normal arch anatomy with the origins patent. Right subclavian artery: Patent    Left subclavian artery: Patent    Right common carotid artery: Patent    Left common carotid artery: Patent    Cervical right internal carotid artery: Patent with no significant stenosis by  NASCET criteria. Cervical left internal carotid artery: Patent with no significant stenosis by  NASCET criteria. Right vertebral artery: Patent    Left vertebral artery: Patent    The lung apices are clear. The thyroid is homogeneous. No cervical  lymphadenopathy. Measurements utilize NASCET criteria. CTA HEAD:    Right cavernous internal carotid artery: Patent    Left cavernous internal carotid artery: Patent    Anterior cerebral arteries: Patent    Anterior communicating artery: Patent    Right middle cerebral artery: Patent    Left middle cerebral artery: Patent    Posterior communicating arteries: Patent    Posterior cerebral arteries: Fetal origin bilaterally. Distal branches are  patent bilaterally. Basilar artery: Patent    Distal vertebral arteries: Patent    No evidence for intracranial aneurysm or hemodynamically significant stenosis. CT Perfusion:  Essentially normal perfusion. Tiny focus of delayed perfusion in the right  temporal lobe is likely artifactual..    Impression  No acute vascular abnormality, no large vessel occlusion.  No hemodynamically  significant stenosis, normal perfusion. MRI Results (maximum last 3): Results from [place] encounter on 08/03/22    MRI BRAIN WO CONT    Narrative  CLINICAL HISTORY: Left arm and leg weakness, facial numbness. INDICATION: Left arm and leg weakness. COMPARISON: 5/4/2022    TECHNIQUE: MR examination of the brain includes axial and sagittal T1, coronal  T2, axial T2, axial FLAIR, axial gradient echo, axial DWI. CONTRAST: None    FINDINGS:  There is no intracranial mass, hemorrhage or evidence of acute infarction. There are few scattered foci of increased T2 signal intensity in the corona  radiata and centrum semiovale. The brain architecture is normal. There is no evidence of midline shift or  mass-effect. There are no extra-axial fluid collections. There is no Chiari or  syrinx. The pituitary and infundibulum are grossly unremarkable. There is no  skull base mass. Cerebellopontine angles are grossly unremarkable. The major  intracranial vascular flow-voids are unremarkable. The cavernous sinuses are  symmetric. Optic chiasm and infundibulum grossly unremarkable. Orbits are  grossly symmetric. Dural venous sinuses are grossly patent. The mastoid air cells are well pneumatized and clear. Impression  Normal MRI of the main for patient age. There is no intracranial mass, hemorrhage or evidence of acute infarction. No acute intracranial process is demonstrated. Results from [place] encounter on 05/04/22    MRI BRAIN WO CONT    Narrative  EXAM: MRI BRAIN WO CONT    INDICATION: Left extremity numbness. COMPARISON: CT angiography head earlier today. Beena Oceana CONTRAST: None. TECHNIQUE:  Multiplanar multisequence acquisition without contrast of the brain. FINDINGS:  Mild cerebral volume loss. No hydrocephalus. There is no acute infarct,  hemorrhage, extra-axial fluid collection, or mass effect. Mild chronic  microvascular ischemic disease in the bilateral frontal and parietal white  matter. Expected arterial flow-voids are present. Sagittal midline structures are within normal limits. No gradient susceptibility  artifact. Medial temporal lobes are symmetric. Impression  Cerebral volume loss and mild chronic microvascular ischemic disease. No acute  infarct. Results from [place] encounter on 12/30/13    MRI KNEE RT WO CONT    Narrative  **Final Report**      ICD Codes / Adm. Diagnosis: 836.2  930.9 / Other tear of cartilage or men  Foreign body in unspecified  Examination:  MR KNEE WO CON RT  - [id number] - Dec 30 2013  7:03AM  Accession No:  [id number]  Reason:  meniscus tear      REPORT:  EXAM:  MR KNEE WO CON RT    INDICATION: Pain    COMPARISON: None    TECHNIQUE: Axial T2 fat-saturated and proton density fat-saturated; coronal  T1 and proton density fat-saturated; and sagittal T2 fat-saturated, proton  density fat-saturated, and gradient echo MRI of the right knee . CONTRAST:  None. FINDINGS:    ACL and PCL: Intact. Collateral ligaments and posterior, lateral corner: Intact. Extensor mechanism: Intact. .    Patellofemoral alignment: There is mild to moderate lateral patellar  subluxation and tilt. The trochlear groove is borderline hypoplastic TT-TG  distance: 19 mm    Joint fluid: There is a small joint effusion and moderate-sized Baker's  cyst. The Baker's cyst contains a 13 mm loose body    Medial meniscus: There is radial tearing of the posterior horn medial  meniscus near its root insertion. The body has not subluxed from the joint    Lateral meniscus: There is marked degenerative tearing and abnormal signal  within the anterior horn of the lateral meniscus. There is mild degenerative  signal within the body and posterior horn. Articular cartilage: There is diffuse intermediate grade partial thickness  fissuring of the lateral patellar facet cartilage. There is approximately 16  mm high-grade partial-thickness cartilage defect superior medial trochlear  groove. There is focal high signal at the base of the cartilage posterior  weightbearing surface lateral femoral condyle best seen on image 10-7  without overlying cartilage defect. This may represent early delamination. Bone marrow: Within normal limits. No acute fracture, dislocation, or marrow  replacing process. Soft tissue mass: None. Impression  :    1 . Radial tearing posterior horn medial meniscus without subluxation of the  body  2. Prominent degeneration of the lateral meniscus most severe in the  anterior horn where there is associated complex tearing. The body is not  subluxed from the joint  3. Mild patellofemoral tracking abnormality with degeneration of the  patellofemoral compartment and loose body in the Baker's cyst          Signing/Reading Doctor: Obdulia Bethea ([id number])  Approved: Obdulia Bethea ([id number])  Dec 30 2013 10:18AM      VAS/US/Carotid Doppler Results (maximum last 3): Results from [place] encounter on 06/27/16    DUPLEX LOWER EXT VENOUS RIGHT    Narrative  **Final Report**      ICD Codes / Adm. Diagnosis: 729.5  M79.604 / Pain in limb  Pain In Right Leg  Examination:  US LOW EXT VENOUS DOPPLER UNI RT  - [id number] - Jun 27 2016  1:05PM  Accession No:  [id number]  Reason:  Pain of right leg      REPORT:  Clinical indication: Swelling ,  pain    Unilateral venous Doppler performed on theright lower extremity shows no  evidence for deep venous thrombosis masses or fluid collection. Color-flow, compression and real-time examination were performed. Spectral  analysis performed    Impression  : Negative examination. Signing/Reading Doctor: Rebecca Aguilera ([id number])  Approved: Rebecca Aguilera ([id number])  Jun 27 2016 11:46AM      PET Results (maximum last 3): No results found for this or any previous visit. Assessment and Plan        78-year-old gentleman whom I suspect had a complicated migraine possibly provoked by severe stressors emotionally.   He also has family history for migraine. I am going to give him a dose of magnesium IV here in the hospital.  I reviewed all the imaging and everything is nonacute which is reassuring. I think he may have more these events. We discussed his migraine symptoms and detail and we also did stroke education. Stay on his low-dose aspirin as is for stroke prevention. He is doing better and there is no stroke. If he is medically cleared he could be discharged today to go home. He can follow-up at [facility] where he was seen recently. This clinical note was dictated with an electronic dictation software that can make unintentional errors. If there are any questions, please contact me directly for clarification.       [address] [facility], DO  NEUROLOGIST  Diplomate RAMA  8/4/2022

## 2022-08-04 NOTE — PROGRESS NOTES
SPEECH PATHOLOGY BEDSIDE SWALLOW EVALUATION/DISCHARGE  Patient: Phyllis Lopez (34 y.o. male)  Date: 8/4/2022  Primary Diagnosis: CVA (cerebral vascular accident) [facility]) [I63.9]       Precautions:        ASSESSMENT :  Patient admitted with L sided headache and L facial numbness with some possible slurred speech. MRI revealed no acute change. Based on the objective data described below, the patient presents with functional oropharyngeal swallow with no difficulties or s/s of aspiration appreciated at bedside with any consistencies. Patient denies any speech or swallowing concerns and has been tolerating regular diet/thin liquids. Recommend regular diet/thin liquids with general aspiration precautions. Suspect pt is at baseline swallow function and therefore, skilled acute therapy provided by a speech-language pathologist is not indicated at this time. Patient also presents with function speech and language. Patient with occasional blended word boundaries during conversation however this did not impact his functional intelligibility and patient reports that this is his baseline. Given this and MRI showing no acute infarct SLP intervention for speech not indicated at this time. SLP will sign off. Please reconsult with any changes or if SLP can be of any further assistance. PLAN :  Recommendations:  -- regular diet/ thin liquids  -- general aspiration precautions including completely upright for all PO   -- SLP will sign off     Discharge Recommendations: None     SUBJECTIVE:   Patient stated Adolm Poll you very much after session.     OBJECTIVE:     Past Medical History:   Diagnosis Date    Cholelithiasis 8/22/2011    High cholesterol     Hypertension     Left sided numbness 5/5/2022    Other ill-defined conditions(799.89)     hypercholesterolemia    Paresthesia 5/4/2022    Psychiatric disorder     anxiety    Seasonal allergies     Status post laparoscopic cholecystectomy 8/25/11     Past Surgical History: Procedure Laterality Date    HX CHOLECYSTECTOMY      HX ORTHOPAEDIC      back srgery, arm surgery    [place] LAP,CHOLECYSTECTOMY  8/25/11     Prior Level of Function/Home Situation:   Home Situation  Home Environment: Private residence  # Steps to Enter: 3  One/Two Story Residence: One story  Living Alone: No  Support Systems: Spouse/Significant Other  Patient Expects to be Discharged to[de-identified] Home  Current DME Used/Available at Home: None  Tub or Shower Type: Tub/Shower combination  Diet prior to admission: regular/thin  Current Diet:  reglar/thin   Cognitive and Communication Status:  Neurologic State: Alert  Orientation Level: Oriented X4  Cognition: Follows commands  Perception: Appears intact  Perseveration: No perseveration noted  Safety/Judgement: Awareness of environment  Oral Assessment:  Oral Assessment  Labial: No impairment  Dentition: Intact; Natural  Oral Hygiene: moist oral mucosa free of secretions  Lingual: No impairment  Mandible: No impairment  P.O. Trials:  Patient Position: upright in bed  Vocal quality prior to P.O.: No impairment  Consistency Presented: Thin liquid; Solid  How Presented: Self-fed/presented     Bolus Acceptance: No impairment  Bolus Formation/Control: No impairment     Propulsion: No impairment  Oral Residue: None  Initiation of Swallow: No impairment  Laryngeal Elevation: Functional  Aspiration Signs/Symptoms: None  Pharyngeal Phase Characteristics: No impairment, issues, or problems              Oral Phase Severity: No impairment  Pharyngeal Phase Severity : No impairment  NOMS:   The NOMS functional outcome measure was used to quantify this patient's level of swallowing impairment.   Based on the NOMS, the patient was determined to be at level 7 for swallow function     NOMS Swallowing Levels:  Level 1 (CN): NPO  Level 2 (CM): NPO but takes consistency in therapy  Level 3 (CL): Takes less than 50% of nutrition p.o. and continues with nonoral feedings; and/or safe with mod cues; and/or max diet restriction  Level 4 (CK): Safe swallow but needs mod cues; and/or mod diet restriction; and/or still requires some nonoral feeding/supplements  Level 5 (CJ): Safe swallow with min diet restriction; and/or needs min cues  Level 6 (CI): Independent with p.o.; rare cues; usually self cues; may need to avoid some foods or needs extra time  Level 7 ([address] [place] [address]): Independent for all p.o.  INDER. (2003). National Outcomes Measurement System (NOMS): Adult Speech-Language Pathology User's Guide. Pain:  Pain Scale 1: Numeric (0 - 10)  Pain Intensity 1: 0     After treatment:   Patient left in no apparent distress in bed, Call bell within reach, and Nursing notified    COMMUNICATION/EDUCATION:     The patient's plan of care including recommendations, planned interventions, and recommended diet changes were discussed with: Registered nurse.      Thank you for this referral.  DURAN Deleon Pathologist     Time Calculation: 12 mins

## 2022-08-04 NOTE — PROGRESS NOTES
I have reviewed discharge instructions with the patient. The patient verbalized understanding. Bedside RN performed patient education and medication education. Discharge concerns initiated and discussed with patient, including clarification on \"who\" assists the patient at their home and instructions for when the home going patient should call their provider after discharge. Opportunity for questions and clarification was provided. Patient receptive to education: YES  Patient stated: Verbalized Understanding  Barriers to Education: N/A  Diagnosis Education given:  YES    Length of stay: 1  Expected Day of Discharge: 8/4/2022  Ask if they have \"Help at Home\" & add to white board?   YES    Education Day #: 1      Pt aware of HCAHPS survey: YES          Stroke Education documented in Patient Education: YES  Core Measures Documented in [facility]:  Risk Factors: YES  Warning signs of stroke: YES  When to Activate 911: YES  Medication Education for Risk Factors: YES  Smoking cessation if applicable: YES  Written Education Given:  YES    Discharge NIH Completed: YES  Score: 0

## 2022-08-04 NOTE — PROGRESS NOTES
Transition of Care Plan: home with spouse    RUR: 9% low    PCP F/U: Dr. Hoang Yeager    Disposition: home with spouse    Transportation: spouse    Main Contact: Spouse: Ruben Du: [phone]    1226: Met with patient at the bedside. Introduced self and role of CM. A&Ox4. Confirmed demographics. States he is independent and lives with his wife in their one story home. Spoke with therapy. States he will not have any rehab needs at discharge. Plan is for patient to return home when medically stable and wife to transport patient home. Abena Chaves RN, CRM    Medicare pt has received, reviewed, and signed 1st IM letter informing them of their right to appeal the discharge. Signed copy has been placed on pt bedside chart. Residency:  one story home with three steps to enter, no issues  Lives With: spouse  Prior functioning:  independent  Prior DME used: none  Rehab history: OP therapy  [facility]    Reason for Admission:  CVA                   RUR Score:  9% low                   Plan for utilizing home health:   not indicated at this time       PCP: First and Last name:  Geeta Ward MD     Name of Practice:    Are you a current patient: Yes/No: yes   Approximate date of last visit: a couple of weeks ago   Can you participate in a virtual visit with your PCP:                     Current Advanced Directive/Advance Care Plan: Full Code    Healthcare Decision Maker:   Click here to complete [address] including selection of the Healthcare Decision Maker Relationship (ie \"Primary\")  Spouse: Ruben Du: [phone]                        Transition of Care Plan:    home with spouse               Care Management Interventions  PCP Verified by CM: Yes (Dr Hoang Yeager)  Mode of Transport at Discharge:  Other (see comment) (family)  Physical Therapy Consult: Yes  Occupational Therapy Consult: Yes  Speech Therapy Consult: Yes  Support Systems: Spouse/Significant Other  Confirm Follow Up Transport: Family  Discharge Location  Patient Expects to be Discharged to[de-identified] Home

## 2022-08-04 NOTE — PROGRESS NOTES
Occupational Therapy  08/04/22    Orders received, chart reviewed and patient evaluated by occupational therapy. Pending progression with skilled acute occupational therapy, recommend:  No skilled occupational therapy/ follow up rehabilitation needs identified at this time. Recommend with nursing ADLs with supervision/setup, OOB to chair 3x/day and toileting via functional mobility to and from bathroom with standby assist. Thank you for completing as able in order to maintain patient strength, endurance and independence. Full evaluation to follow.      Thank you,   Carmella Gutierrez, OTLAURA, OTR/L

## 2022-08-04 NOTE — PROGRESS NOTES
PHYSICAL THERAPY EVALUATION WITH DISCHARGE  Patient: Tessa Marx (32 y.o. male)  Date: 8/4/2022  Primary Diagnosis: CVA (cerebral vascular accident) [facility]) [I63.9]       Precautions:          ASSESSMENT  Based on the objective data described below, the patient presents at his baseline functional level following admission for c/o L facial numbness and slurred speech. CT and MRI negative for acute process. At baseline, he lived at home with family and was indep and active without AD. Neuro exam today appears non focal; strength, coordination, sensation, and vision in tact. Questionable slight dysarthria however pt indicates this is his baseline. He was able to complete bed mobility, transfers, and ambulation in hallway without AD at an overall SUP/INDEP level without AD. No overt LOB, deviation, or difficulty with minimal challenges. Julian score indicates a low falls risk. Provided education on BE FAST and answered questions to satisfaction. At at this time he appears at his baseline level - recommend discharge home with family once medically cleared. No further acute care PT indicated, will sign off. Functional Outcome Measure: The patient scored Total: 46/56 on the Julian Balance Assessment which is indicative of low fall risk. Other factors to consider for discharge: none     Further skilled acute physical therapy is not indicated at this time. PLAN :  Recommendation for discharge: (in order for the patient to meet his/her long term goals)  No skilled physical therapy/ follow up rehabilitation needs identified at this time. This discharge recommendation:  Has been made in collaboration with the attending provider and/or case management    IF patient discharges home will need the following DME: none         SUBJECTIVE:   Patient stated I go to the gym a lot, I do 11 miles a day.     OBJECTIVE DATA SUMMARY:   HISTORY:    Past Medical History:   Diagnosis Date    Cholelithiasis 8/22/2011    High cholesterol     Hypertension     Left sided numbness 5/5/2022    Other ill-defined conditions(799.89)     hypercholesterolemia    Paresthesia 5/4/2022    Psychiatric disorder     anxiety    Seasonal allergies     Status post laparoscopic cholecystectomy 8/25/11     Past Surgical History:   Procedure Laterality Date    HX CHOLECYSTECTOMY      HX ORTHOPAEDIC      back srgery, arm surgery    [place] LAP,CHOLECYSTECTOMY  8/25/11       Prior level of function: Lives at home with family; indep and active without AD  Personal factors and/or comorbidities impacting plan of care: PMHx    Home Situation  Home Environment: Private residence  # Steps to Enter: 3  One/Two Story Residence: One story  Living Alone: No  Support Systems: Spouse/Significant Other  Patient Expects to be Discharged to[de-identified] Home  Current DME Used/Available at Home: None  Tub or Shower Type: Tub/Shower combination    EXAMINATION/PRESENTATION/DECISION MAKING:   Critical Behavior:  Neurologic State: Alert  Orientation Level: Oriented X4  Cognition: Appropriate decision making, Appropriate for age attention/concentration, Appropriate safety awareness, Follows commands  Safety/Judgement: Awareness of environment, Fall prevention, Home safety, Good awareness of safety precautions  Hearing:   Auditory  Auditory Impairment: None  Skin:    Edema:   Range Of Motion:  AROM: Within functional limits     Strength:    Strength: Generally decreased, functional (LUE decreased at shoulder, sx x2)     Tone & Sensation:   Tone: Normal  Sensation: Intact      Coordination:  Coordination: Within functional limits  Vision:   Tracking: Able to track stimulus in all quadrants w/o difficulty  Diplopia: No  Acuity: Within Defined Limits  Corrective Lenses: Glasses  Functional Mobility:  Bed Mobility:     Supine to Sit: Independent  Scooting: Independent    Transfers:  Sit to Stand: Supervision  Stand to Sit: Supervision     Balance:   Sitting: Intact  Standing: Impaired  Standing - Static: Good  Standing - Dynamic : Good;Fair    Ambulation/Gait Training:  Distance (ft): 300 Feet (ft)  Assistive Device: Gait belt  Ambulation - Level of Assistance: Supervision  Gait Abnormalities: Decreased step clearance;Trunk sway increased      Functional Measure  Julian Balance Test:    Sitting to Standin  Standing Unsupported: 4  Sitting with Back Unsupported: 4  Standing to Sittin  Transfers: 4  Standing Unsupported with Eyes Closed: 4  Standing Unsupported with Feet Together: 4  Reach Forward with Outstretched Arm: 4   Object: 4  Turn to Look Over Shoulders: 4  Turn 360 Degrees: 2  Alternate Foot on Step/Stool: 2  Standing Unsupported One Foot in Front: 1  Stand on One Le  Total: 46/56         56=Maximum possible score;   0-20=High fall risk  21-40=Moderate fall risk   41-56=Low fall risk        Physical Therapy Evaluation Charge Determination   History Examination Presentation Decision-Making   MEDIUM  Complexity : 1-2 comorbidities / personal factors will impact the outcome/ POC  LOW Complexity : 1-2 Standardized tests and measures addressing body structure, function, activity limitation and / or participation in recreation  LOW Complexity : Stable, uncomplicated  LOW Complexity : FOTO score of       Based on the above components, the patient evaluation is determined to be of the following complexity level: LOW         Activity Tolerance:   Good    After treatment patient left in no apparent distress:   Sitting in chair and Call bell within reach    COMMUNICATION/EDUCATION:   The patients plan of care was discussed with: Occupational therapist and Registered nurse. Patient was educated regarding His deficit(s) of resolved facial numbness as this relates to His diagnosis of r/o TIA/CVA. He demonstrated Good understanding as evidenced by nodding. Patient and/or family was verbally educated on the BE FAST acronym for signs/symptoms of CVA and TIA.  BE FAST was written on patient's communication board  for visual education and reinforcement. All questions answered with patient indicating good understanding. Fall prevention education was provided and the patient/caregiver indicated understanding.     Thank you for this referral.  Fidencio Currie, PT, DPT   Time Calculation: 15 mins

## 2022-08-04 NOTE — PROGRESS NOTES
Occupational Therapy  08/04/22    Order acknowledged, chart reviewed. Attempted to see for OT evaluation however ECHO at bedside. Will follow up later today as able & appropriate.      Thank you,   Seferino Bynum, OTD, OTR/L

## 2022-08-04 NOTE — PROGRESS NOTES
OCCUPATIONAL THERAPY EVALUATION/DISCHARGE  Patient: Sona John (1 y.o. male)  Date: 8/4/2022  Primary Diagnosis: CVA (cerebral vascular accident) [facility]) [I63.9]       Precautions:        ASSESSMENT  Based on the objective data described below, the patient presents with return to baseline function s/p admission for L sided weakness, slurred speech, and L frontal HA, imaging negative for acute neuro process, workup for complex migraine noted. At baseline, he is IND with ADLs/IADLs and mobility, lives with his wife, enjoys yardwork, reports some baseline balance difficulty. He now presents back to his baseline, all admitting symptoms resolved, no focal deficits noted. Reviewed BE FAST with good carryover, patient reporting no concerns for d/c home once cleared. Current Level of Function (ADLs/self-care): IND-SPV     Functional Outcome Measure: The patient scored 90/100 on the Barthel Index indicating he is independent in basic self care and mobility    Other factors to consider for discharge: none     PLAN :  Recommendation for discharge: (in order for the patient to meet his/her long term goals)  No skilled occupational therapy/ follow up rehabilitation needs identified at this time. This discharge recommendation:  Has been made in collaboration with the attending provider and/or case management    IF patient discharges home will need the following DME: none       SUBJECTIVE:   Patient stated I feel back to myself now.     OBJECTIVE DATA SUMMARY:   HISTORY:   Past Medical History:   Diagnosis Date    Cholelithiasis 8/22/2011    High cholesterol     Hypertension     Left sided numbness 5/5/2022    Other ill-defined conditions(799.89)     hypercholesterolemia    Paresthesia 5/4/2022    Psychiatric disorder     anxiety    Seasonal allergies     Status post laparoscopic cholecystectomy 8/25/11     Past Surgical History:   Procedure Laterality Date    HX CHOLECYSTECTOMY      HX ORTHOPAEDIC      back srgery, arm surgery    [place] LAP,CHOLECYSTECTOMY  8/25/11       Prior Level of Function/Environment/Context:   Expanded or extensive additional review of patient history:     Home Situation  Home Environment: Private residence  # Steps to Enter: 3  One/Two Story Residence: One story  Living Alone: No  Support Systems: Spouse/Significant Other  Patient Expects to be Discharged to[de-identified] Home  Current DME Used/Available at Home: None  Tub or Shower Type: Tub/Shower combination    Hand dominance: Right    EXAMINATION OF PERFORMANCE DEFICITS:  Cognitive/Behavioral Status:  Neurologic State: Alert  Orientation Level: Oriented X4  Cognition: Appropriate decision making; Appropriate for age attention/concentration; Appropriate safety awareness; Follows commands  Perception: Appears intact  Perseveration: No perseveration noted  Safety/Judgement: Awareness of environment; Fall prevention;Home safety;Good awareness of safety precautions    Skin: Appears intact    Edema: None    Hearing: Auditory  Auditory Impairment: None    Vision/Perceptual:    Tracking: Able to track stimulus in all quadrants w/o difficulty                 Diplopia: No    Acuity: Within Defined Limits    Corrective Lenses: Glasses    Range of Motion:  AROM: Within functional limits                         Strength:  Strength: Generally decreased, functional (LUE decreased at shoulder, sx x2)                Coordination:  Coordination: Within functional limits  Fine Motor Skills-Upper: Left Intact; Right Intact    Gross Motor Skills-Upper: Left Intact; Right Intact    Tone & Sensation:  Tone: Normal  Sensation: Intact                      Balance:  Sitting: Intact  Standing: Impaired  Standing - Static: Good  Standing - Dynamic : Good;Fair    Functional Mobility and Transfers for ADLs:  Bed Mobility:  Supine to Sit: Independent  Scooting: Independent    Transfers:  Sit to Stand: Supervision  Stand to Sit: Supervision  Toilet Transfer : Supervision    ADL Assessment:  Feeding: Independent    Oral Facial Hygiene/Grooming: Supervision    Bathing: Stand-by assistance         Upper Body Dressing: Independent    Lower Body Dressing: Supervision    Toileting: Supervision                ADL Intervention and task modifications:     Lower Body Dressing Assistance  Dressing Assistance: Independent  Slip on Shoes with Back: Independent  Leg Crossed Method Used: No  Position Performed: Bending forward method;Seated edge of bed;Standing         Cognitive Retraining  Safety/Judgement: Awareness of environment; Fall prevention;Home safety;Good awareness of safety precautions    Functional Measure:    Barthel Index:  Bathin  Bladder: 10  Bowels: 10  Groomin  Dressing: 10  Feeding: 10  Mobility: 10  Stairs: 5  Toilet Use: 10  Transfer (Bed to Chair and Back): 15  Total: 90/100      The Barthel ADL Index: Guidelines  1. The index should be used as a record of what a patient does, not as a record of what a patient could do. 2. The main aim is to establish degree of independence from any help, physical or verbal, however minor and for whatever reason. 3. The need for supervision renders the patient not independent. 4. A patient's performance should be established using the best available evidence. Asking the patient, friends/relatives and nurses are the usual sources, but direct observation and common sense are also important. However direct testing is not needed. 5. Usually the patient's performance over the preceding 24-48 hours is important, but occasionally longer periods will be relevant. 6. Middle categories imply that the patient supplies over 50 per cent of the effort. 7. Use of aids to be independent is allowed. Score Interpretation (from [address] [facility] 83)    Independent   60-79 Minimally independent   40-59 Partially dependent   20-39 Very dependent   <20 Totally dependent     -Anastacio Bar., Barthel, D.W. (1965). Functional evaluation: the Barthel Index.  [address] [facility] (14)2.  -ELVIS Arambula, [address] (1997). The Barthel activities of daily living index: self-reporting versus actual performance in the old (> or = 75 years). Journal [address] 45(7), [facility], [facility] Hospital[facility]a, Otis Flaherty., Mary Lemos. (1999). Measuring the change in disability after inpatient rehabilitation; comparison of the responsiveness of the Barthel Index and Functional Helm Measure. Journal of Neurology, Neurosurgery, and Psychiatry, 66(4), 514-183. CRISTEL Larson, JON Arroyo, & Rani Ariza M.A. (2004) Assessment of post-stroke quality of life in cost-effectiveness studies: The usefulness of the Barthel Index and the EuroQoL-5D. Quality of Life Research, 13, 739-59     [employer] not indicated, no BUE deficits, all imagine negative for acute neuro process    Occupational Therapy Evaluation Charge Determination   History Examination Decision-Making   LOW Complexity : Brief history review  LOW Complexity : 1-3 performance deficits relating to physical, cognitive , or psychosocial skils that result in activity limitations and / or participation restrictions  LOW Complexity : No comorbidities that affect functional and no verbal or physical assistance needed to complete eval tasks       Based on the above components, the patient evaluation is determined to be of the following complexity level: LOW   Pain Rating:  None    Activity Tolerance: WNL    After treatment patient left in no apparent distress:    Sitting in chair and Call bell within reach    COMMUNICATION/EDUCATION:   The patients plan of care was discussed with: Physical therapist, Registered nurse, and Case management. Patient was educated regarding his deficit(s) stated above (resolved) as this relates to his diagnosis of complex migraine & CVA w/u. He demonstrated Good understanding as evidenced by verbal discussion & carryover.     Patient and/or family was verbally educated on the BE FAST acronym for signs/symptoms of CVA and TIA. BE FAST was written on patient's communication board  for visual education and reinforcement. All questions answered with patient indicating good understanding.      Thank you for this referral.  CHALINO Alonzo, OTR/L  Time Calculation: 15 mins

## 2022-08-06 NOTE — DISCHARGE SUMMARY
Discharge Summary       PATIENT ID: Nasim Johns  MRN: [id number]   YOB: 1965    DATE OF ADMISSION: 8/3/2022  3:39 PM    DATE OF DISCHARGE: 8/4/2022  PRIMARY CARE PROVIDER: Clois Cabot, MD     ATTENDING PHYSICIAN: Ingris Norris MD    DISCHARGING PROVIDER: Kojo Barry MD    To contact this individual call [phone] and ask the  to page. If unavailable ask to be transferred the Adult Hospitalist Department. CONSULTATIONS: IP CONSULT TO NEUROLOGY  IP CONSULT TO NEUROLOGY    PROCEDURES/SURGERIES: * No surgery found *    DISCHARGE DIAGNOSES:   [address] [facility],[address] COURSE:   DISCHARGE DIAGNOSES / PLAN:    Per H and P \"59 y.o. male who presents with facial numbness    Patient reports that he is tolerating a left-sided headache associated with facial numbness, reports that he felt that his speech was somewhat slurred, but cannot fully define that. Patient reports that he also had some nausea, felt \"weird\" called EMS and came to the ER, in the ER patient was deemed as a code stroke, and was requested to be admitted to the hospitalist service, patient denies any other complaints or problems\"       Left facial numbness and Headache-resolved, attributed to complex migraine  Hypertension  Hyperlipidemia        Per neuro note:  61-year-old gentleman whom I suspect had a complicated migraine possibly provoked by severe stressors emotionally. He also has family history for migraine. I am going to give him a dose of magnesium IV here in the hospital.  I reviewed all the imaging and everything is nonacute which is reassuring. I think he may have more these events. We discussed his migraine symptoms and detail and we also did stroke education. Stay on his low-dose aspirin as is for stroke prevention. He is doing better and there is no stroke. If he is medically cleared he could be discharged today to go home.   He can follow-up at [facility] where he was seen recently          MRI BRAIN WO CONT    Result Date: 8/4/2022  CLINICAL HISTORY: Left arm and leg weakness, facial numbness. INDICATION: Left arm and leg weakness. COMPARISON: 5/4/2022 TECHNIQUE: MR examination of the brain includes axial and sagittal T1, coronal T2, axial T2, axial FLAIR, axial gradient echo, axial DWI. CONTRAST: None FINDINGS: There is no intracranial mass, hemorrhage or evidence of acute infarction. There are few scattered foci of increased T2 signal intensity in the corona radiata and centrum semiovale. The brain architecture is normal. There is no evidence of midline shift or mass-effect. There are no extra-axial fluid collections. There is no Chiari or syrinx. The pituitary and infundibulum are grossly unremarkable. There is no skull base mass. Cerebellopontine angles are grossly unremarkable. The major intracranial vascular flow-voids are unremarkable. The cavernous sinuses are symmetric. Optic chiasm and infundibulum grossly unremarkable. Orbits are grossly symmetric. Dural venous sinuses are grossly patent. The mastoid air cells are well pneumatized and clear. Normal MRI of the main for patient age. There is no intracranial mass, hemorrhage or evidence of acute infarction. No acute intracranial process is demonstrated. CTA CODE NEURO HEAD AND NECK W CONT    Result Date: 8/3/2022  CLINICAL HISTORY: Left arm/leg weakness, facial numbness EXAMINATION:  CT ANGIOGRAPHY HEAD AND NECK, CT PERFUSION COMPARISON: MR brain May 4, 2022 TECHNIQUE:  Following the uneventful administration of iodinated contrast material, axial CT angiography of the head and neck was performed. Delayed axial images through the head were also obtained. Coronal and sagittal reconstructions were obtained. Manual postprocessing of images was performed. 3-D  Sagittal maximal intensity projection images were obtained. 3-D Coronal maximal intensity projections were obtained.  CT brain perfusion was performed with generation of hemodynamic maps of multiple parameters, including cerebral blood flow, cerebral blood volume, mean transit time, and TMAX. CT dose reduction was achieved through use of a standardized protocol tailored for this examination and automatic exposure control for dose modulation. This study was analyzed by the [address] ai algorithm. FINDINGS: DELAYED ENHANCEMENT HEAD CT No intra or extra-axial mass or collection. Periventricular white matter disease compatible with chronic small vessel ischemic change. Ventricles are normal in size and configuration. The basal cisterns are patent. Dural venous sinuses are patent. No abnormal parenchymal or meningeal enhancement. Mastoid air cells and paranasal sinuses are clear. CTA NECK: Great vessels: Normal arch anatomy with the origins patent. Right subclavian artery: Patent Left subclavian artery: Patent Right common carotid artery: Patent Left common carotid artery: Patent Cervical right internal carotid artery: Patent with no significant stenosis by NASCET criteria. Cervical left internal carotid artery: Patent with no significant stenosis by NASCET criteria. Right vertebral artery: Patent Left vertebral artery: Patent The lung apices are clear. The thyroid is homogeneous. No cervical lymphadenopathy. Measurements utilize NASCET criteria. CTA HEAD: Right cavernous internal carotid artery: Patent Left cavernous internal carotid artery: Patent Anterior cerebral arteries: Patent Anterior communicating artery: Patent Right middle cerebral artery: Patent Left middle cerebral artery: Patent Posterior communicating arteries: Patent Posterior cerebral arteries: Fetal origin bilaterally. Distal branches are patent bilaterally. Basilar artery: Patent Distal vertebral arteries: Patent No evidence for intracranial aneurysm or hemodynamically significant stenosis. CT Perfusion: Essentially normal perfusion.  Tiny focus of delayed perfusion in the right temporal lobe is likely artifactual..     No acute vascular abnormality, no large vessel occlusion. No hemodynamically significant stenosis, normal perfusion. CT CODE NEURO HEAD WO CONTRAST    Result Date: 8/3/2022  EXAM: CT CODE NEURO HEAD WO CONTRAST INDICATION: Slurred Speech, left sided weakness COMPARISON: MRI 5/4/2022. CONTRAST: None. TECHNIQUE: Unenhanced CT of the head was performed using 5 mm images. Brain and bone windows were generated. Coronal and sagittal reformats. CT dose reduction was achieved through use of a standardized protocol tailored for this examination and automatic exposure control for dose modulation. FINDINGS: There is mild atrophy and compensatory dilatation of ventricles. There is mild white matter disease likely to chronic small vessel ischemic disease. There is no intracranial hemorrhage, extra-axial collection, or mass effect. The basilar cisterns are open. No CT evidence of acute infarct. The bone windows demonstrate no abnormalities. The visualized portions of the paranasal sinuses and mastoid air cells are clear. There is a 1.3 cm lipoma in the left parietal scalp. No evidence of acute process. ECHO ADULT COMPLETE    Result Date: 8/4/2022  Formatting of this result is different from the original.   Left Ventricle: Normal left ventricular systolic function with a visually estimated EF of 55 - 60%. Left ventricle size is normal. Normal wall thickness. Normal wall motion. Right Ventricle: Right ventricle is mildly dilated. Normal wall thickness. Moderately reduced systolic function. Left Atrium: Left atrium is mildly dilated. Right Atrium: Right atrium is mildly dilated.    Saline contrast test is probably negative but not enough bubble to see and acoustic window was poor to tell     CT CODE NEURO PERF W CBF    Result Date: 8/3/2022  CLINICAL HISTORY: Left arm/leg weakness, facial numbness EXAMINATION:  CT ANGIOGRAPHY HEAD AND NECK, CT PERFUSION COMPARISON: MR brain May 4, 2022 TECHNIQUE: Following the uneventful administration of iodinated contrast material, axial CT angiography of the head and neck was performed. Delayed axial images through the head were also obtained. Coronal and sagittal reconstructions were obtained. Manual postprocessing of images was performed. 3-D  Sagittal maximal intensity projection images were obtained. 3-D Coronal maximal intensity projections were obtained. CT brain perfusion was performed with generation of hemodynamic maps of multiple parameters, including cerebral blood flow, cerebral blood volume, mean transit time, and TMAX. CT dose reduction was achieved through use of a standardized protocol tailored for this examination and automatic exposure control for dose modulation. This study was analyzed by the [address] ai algorithm. FINDINGS: DELAYED ENHANCEMENT HEAD CT No intra or extra-axial mass or collection. Periventricular white matter disease compatible with chronic small vessel ischemic change. Ventricles are normal in size and configuration. The basal cisterns are patent. Dural venous sinuses are patent. No abnormal parenchymal or meningeal enhancement. Mastoid air cells and paranasal sinuses are clear. CTA NECK: Great vessels: Normal arch anatomy with the origins patent. Right subclavian artery: Patent Left subclavian artery: Patent Right common carotid artery: Patent Left common carotid artery: Patent Cervical right internal carotid artery: Patent with no significant stenosis by NASCET criteria. Cervical left internal carotid artery: Patent with no significant stenosis by NASCET criteria. Right vertebral artery: Patent Left vertebral artery: Patent The lung apices are clear. The thyroid is homogeneous. No cervical lymphadenopathy. Measurements utilize NASCET criteria.  CTA HEAD: Right cavernous internal carotid artery: Patent Left cavernous internal carotid artery: Patent Anterior cerebral arteries: Patent Anterior communicating artery: Patent Right middle cerebral artery: Patent Left middle cerebral artery: Patent Posterior communicating arteries: Patent Posterior cerebral arteries: Fetal origin bilaterally. Distal branches are patent bilaterally. Basilar artery: Patent Distal vertebral arteries: Patent No evidence for intracranial aneurysm or hemodynamically significant stenosis. CT Perfusion: Essentially normal perfusion. Tiny focus of delayed perfusion in the right temporal lobe is likely artifactual..     No acute vascular abnormality, no large vessel occlusion. No hemodynamically significant stenosis, normal perfusion. NOTIFY YOUR PHYSICIAN FOR ANY OF THE FOLLOWING:   Fever over 101 degrees for 24 hours. Chest pain, shortness of breath, fever, chills, nausea, vomiting, diarrhea, change in mentation, falling, weakness, bleeding. Severe pain or pain not relieved by medications, as well as any other signs or symptoms that you may have questions about. FOLLOW UP APPOINTMENTS:    Follow-up Information       Follow up With Specialties Details Why Contact Info    Sabiha Xie MD Family Medicine Follow up in 1 week(s)  [address]  [address] [address] [phone]      Toma Silveira NP Neurology Follow up  [place]  [phone]                     DISCHARGE MEDICATIONS:  Discharge Medication List as of 8/4/2022  2:45 PM        CONTINUE these medications which have NOT CHANGED    Details   amLODIPine (NORVASC) 5 mg tablet Take 5 mg by mouth in the morning., Historical Med      aspirin 81 mg cap Take 81 mg by mouth daily. , OTC      ARIPiprazole (ABILIFY) 15 mg tablet Take 15 mg by mouth every evening., Historical Med      clonazePAM (KlonoPIN) 2 mg tablet Take  by mouth two (2) times a day., Historical Med      carvediloL (COREG) 6.25 mg tablet Take 6.25 mg by mouth two (2) times daily (with meals). , Historical Med      montelukast (SINGULAIR) 10 mg tablet Take 10 mg by mouth in the morning., Historical Med      zolpidem (AMBIEN) 10 mg tablet Take  by mouth nightly as needed for Sleep., Historical Med      FLUoxetine (PROzac) 40 mg capsule Take 40 mg by mouth two (2) times a day., Historical Med      atorvastatin (LIPITOR) 40 mg tablet Take 1 Tablet by mouth nightly., Normal, Disp-30 Tablet, R-0      gabapentin (NEURONTIN) 300 mg capsule Take 900 mg by mouth two (2) times a day., Historical Med      lisinopril-hydrochlorothiazide (PRINZIDE, ZESTORETIC) 20-12.5 mg per tablet Take 1 Tablet by mouth every evening. 1 tablet daily for 30 days per prescription, Historical Med      meclizine (ANTIVERT) 25 mg tablet Take  by mouth three (3) times daily as needed., Historical Med      omega-3 fatty acids-vitamin e 1,000 mg cap Take 1 Capsule by mouth daily. , Historical Med             DISPOSITION:  x  Home With:   OT  PT  HH  RN       Long term SNF/Inpatient Rehab    Independent/assisted living    Hospice    Other:       PATIENT CONDITION AT DISCHARGE:     Functional status    Poor     Deconditioned    x Independent      Cognition    x Lucid     Forgetful     Dementia      Catheters/lines (plus indication)    [place]     PICC     PEG    x None      Code status    x Full code     DNR      PHYSICAL EXAMINATION AT DISCHARGE:  General: Alert x oriented x 3, awake,  HEENT: PEERL, EOMI, moist mucus membranes  Neck: Supple, no JVD, no meningeal signs  Chest: Clear to auscultation bilaterally  CVS: RRR, S1 S2 heard, no murmurs/rubs/gallops  Abd: Soft, non-tender, non-distended, +bowel sounds  Ext: No clubbing, no cyanosis, no edema  Neuro/Psych: Pleasant mood and affect, CN 2-12 grossly intact, sensory grossly within normal limit, Strength 5/5 in all extremities  Cap refill: Brisk, less than 3 seconds  Pulses: 2+, symmetric in all extremities  Skin: Warm, dry, without rashes or lesions    CHRONIC MEDICAL DIAGNOSES:  Problem List as of 8/4/2022 Date Reviewed: 5/12/2022            Codes Class Noted - Resolved CVA (cerebral vascular accident) [facility]) ICD-10-CM: I63.9  ICD-9-CM: 434.91  8/3/2022 - Present        Alcohol abuse, in remission ICD-10-CM: F10.11  ICD-9-CM: 305.03  5/12/2022 - Present    Overview Signed 5/12/2022 12:36 PM by Kylah Rascon NP     In remission since 2019             Ecchymosis ICD-10-CM: R58  ICD-9-CM: 459.89  5/12/2022 - Present        Other male erectile dysfunction ICD-10-CM: N52.8  ICD-9-CM: 607.84  5/12/2022 - Present        History of chest pain ICD-10-CM: Z87.898  ICD-9-CM: V13.89  5/12/2022 - Present        Small vessel disease, cerebrovascular ICD-10-CM: I67.9  ICD-9-CM: 437.9  5/12/2022 - Present    Overview Signed 5/12/2022 12:48 PM by Kylah Rascon NP     Mild on MRI scan of 5/4/2022             Transient ischemic attack involving right internal carotid artery ICD-10-CM: G45.1  ICD-9-CM: 435.8  5/5/2022 - Present    Overview Signed 5/12/2022 12:33 PM by Kylah Rascon NP     Presented with left-sided numbness in the face upper and lower extremity May 6, 2022 stroke work-up negative             Status post laparoscopic cholecystectomy ICD-10-CM: Z90.49  ICD-9-CM: V45.89  Unknown - Present        Abdominal pain ICD-10-CM: R10.9  ICD-9-CM: 789.00  8/24/2011 - Present        RESOLVED: Left sided numbness ICD-10-CM: R20.0  ICD-9-CM: 782.0  5/5/2022 - 5/12/2022        RESOLVED: Paresthesia ICD-10-CM: R20.2  ICD-9-CM: 782.0  5/4/2022 - 5/12/2022        RESOLVED: Cholelithiasis ICD-10-CM: K80.20  ICD-9-CM: 574.20  8/22/2011 - 9/13/2011           22  minutes were spent with the patient on counseling and coordination of care    Signed:   Zoey Saldaña MD  8/9/2022  7:45 AM    Cc:Nikki Gary MD

## 2023-01-18 ENCOUNTER — HOSPITAL ENCOUNTER (EMERGENCY)
Age: 58
Discharge: HOME | End: 2023-01-18
Attending: EMERGENCY MEDICINE
Payer: MEDICAID

## 2023-01-18 ENCOUNTER — APPOINTMENT (OUTPATIENT)
Dept: CT IMAGING | Age: 58
End: 2023-01-18
Attending: EMERGENCY MEDICINE
Payer: MEDICAID

## 2023-01-18 VITALS
SYSTOLIC BLOOD PRESSURE: 122 MMHG | HEART RATE: 76 BPM | OXYGEN SATURATION: 96 % | TEMPERATURE: 97.6 F | RESPIRATION RATE: 20 BRPM | DIASTOLIC BLOOD PRESSURE: 65 MMHG

## 2023-01-18 DIAGNOSIS — R10.31 ABDOMINAL PAIN, RIGHT LOWER QUADRANT: Primary | ICD-10-CM

## 2023-01-18 LAB
ALBUMIN SERPL-MCNC: 3.2 G/DL (ref 3.5–5)
ALBUMIN/GLOB SERPL: 1 (ref 1.1–2.2)
ALP SERPL-CCNC: 76 U/L (ref 45–117)
ALT SERPL-CCNC: 31 U/L (ref 12–78)
ANION GAP SERPL CALC-SCNC: 3 MMOL/L (ref 5–15)
APPEARANCE UR: CLEAR
AST SERPL-CCNC: 18 U/L (ref 15–37)
ATRIAL RATE: 70 BPM
BACTERIA URNS QL MICRO: NEGATIVE /HPF
BASOPHILS # BLD: 0.1 K/UL (ref 0–0.1)
BASOPHILS NFR BLD: 1 % (ref 0–1)
BILIRUB SERPL-MCNC: 0.3 MG/DL (ref 0.2–1)
BILIRUB UR QL: NEGATIVE
BUN SERPL-MCNC: 14 MG/DL (ref 6–20)
BUN/CREAT SERPL: 14 (ref 12–20)
CALCIUM SERPL-MCNC: 8.7 MG/DL (ref 8.5–10.1)
CALCULATED P AXIS, ECG09: 66 DEGREES
CALCULATED R AXIS, ECG10: 71 DEGREES
CALCULATED T AXIS, ECG11: 74 DEGREES
CHLORIDE SERPL-SCNC: 105 MMOL/L (ref 97–108)
CO2 SERPL-SCNC: 29 MMOL/L (ref 21–32)
COLOR UR: NORMAL
COMMENT, HOLDF: NORMAL
CREAT SERPL-MCNC: 1.03 MG/DL (ref 0.7–1.3)
DIAGNOSIS, 93000: NORMAL
DIFFERENTIAL METHOD BLD: ABNORMAL
EOSINOPHIL # BLD: 0.2 K/UL (ref 0–0.4)
EOSINOPHIL NFR BLD: 3 % (ref 0–7)
EPITH CASTS URNS QL MICRO: NORMAL /LPF
ERYTHROCYTE [DISTWIDTH] IN BLOOD BY AUTOMATED COUNT: 12.5 % (ref 11.5–14.5)
GLOBULIN SER CALC-MCNC: 3.1 G/DL (ref 2–4)
GLUCOSE SERPL-MCNC: 64 MG/DL (ref 65–100)
GLUCOSE UR STRIP.AUTO-MCNC: NEGATIVE MG/DL
HCT VFR BLD AUTO: 40.9 % (ref 36.6–50.3)
HGB BLD-MCNC: 13.6 G/DL (ref 12.1–17)
HGB UR QL STRIP: NEGATIVE
HYALINE CASTS URNS QL MICRO: NORMAL /LPF (ref 0–5)
IMM GRANULOCYTES # BLD AUTO: 0.1 K/UL (ref 0–0.04)
IMM GRANULOCYTES NFR BLD AUTO: 1 % (ref 0–0.5)
KETONES UR QL STRIP.AUTO: NEGATIVE MG/DL
LEUKOCYTE ESTERASE UR QL STRIP.AUTO: NEGATIVE
LIPASE SERPL-CCNC: 57 U/L (ref 73–393)
LYMPHOCYTES # BLD: 2.7 K/UL (ref 0.8–3.5)
LYMPHOCYTES NFR BLD: 34 % (ref 12–49)
MCH RBC QN AUTO: 31.3 PG (ref 26–34)
MCHC RBC AUTO-ENTMCNC: 33.3 G/DL (ref 30–36.5)
MCV RBC AUTO: 94 FL (ref 80–99)
MONOCYTES # BLD: 0.9 K/UL (ref 0–1)
MONOCYTES NFR BLD: 11 % (ref 5–13)
NEUTS SEG # BLD: 4 K/UL (ref 1.8–8)
NEUTS SEG NFR BLD: 50 % (ref 32–75)
NITRITE UR QL STRIP.AUTO: NEGATIVE
NRBC # BLD: 0 K/UL (ref 0–0.01)
NRBC BLD-RTO: 0 PER 100 WBC
P-R INTERVAL, ECG05: 216 MS
PH UR STRIP: 6.5 (ref 5–8)
PLATELET # BLD AUTO: 142 K/UL (ref 150–400)
PMV BLD AUTO: 10.2 FL (ref 8.9–12.9)
POTASSIUM SERPL-SCNC: 4.1 MMOL/L (ref 3.5–5.1)
PROT SERPL-MCNC: 6.3 G/DL (ref 6.4–8.2)
PROT UR STRIP-MCNC: NEGATIVE MG/DL
Q-T INTERVAL, ECG07: 390 MS
QRS DURATION, ECG06: 86 MS
QTC CALCULATION (BEZET), ECG08: 421 MS
RBC # BLD AUTO: 4.35 M/UL (ref 4.1–5.7)
RBC #/AREA URNS HPF: NORMAL /HPF (ref 0–5)
SAMPLES BEING HELD,HOLD: NORMAL
SODIUM SERPL-SCNC: 137 MMOL/L (ref 136–145)
SP GR UR REFRACTOMETRY: 1.01 (ref 1–1.03)
TROPONIN-HIGH SENSITIVITY: 4 NG/L (ref 0–76)
UR CULT HOLD, URHOLD: NORMAL
UROBILINOGEN UR QL STRIP.AUTO: 1 EU/DL (ref 0.2–1)
VENTRICULAR RATE, ECG03: 70 BPM
WBC # BLD AUTO: 7.9 K/UL (ref 4.1–11.1)
WBC URNS QL MICRO: NORMAL /HPF (ref 0–4)

## 2023-01-18 PROCEDURE — 36415 COLL VENOUS BLD VENIPUNCTURE: CPT

## 2023-01-18 PROCEDURE — 74011000636 HC RX REV CODE- 636: Performed by: EMERGENCY MEDICINE

## 2023-01-18 PROCEDURE — 84484 ASSAY OF TROPONIN QUANT: CPT

## 2023-01-18 PROCEDURE — 96374 THER/PROPH/DIAG INJ IV PUSH: CPT

## 2023-01-18 PROCEDURE — 74177 CT ABD & PELVIS W/CONTRAST: CPT

## 2023-01-18 PROCEDURE — 74011250637 HC RX REV CODE- 250/637: Performed by: EMERGENCY MEDICINE

## 2023-01-18 PROCEDURE — 96375 TX/PRO/DX INJ NEW DRUG ADDON: CPT

## 2023-01-18 PROCEDURE — 83690 ASSAY OF LIPASE: CPT

## 2023-01-18 PROCEDURE — 99285 EMERGENCY DEPT VISIT HI MDM: CPT

## 2023-01-18 PROCEDURE — 93005 ELECTROCARDIOGRAM TRACING: CPT

## 2023-01-18 PROCEDURE — 80053 COMPREHEN METABOLIC PANEL: CPT

## 2023-01-18 PROCEDURE — 85025 COMPLETE CBC W/AUTO DIFF WBC: CPT

## 2023-01-18 PROCEDURE — 81001 URINALYSIS AUTO W/SCOPE: CPT

## 2023-01-18 PROCEDURE — 74011250636 HC RX REV CODE- 250/636: Performed by: EMERGENCY MEDICINE

## 2023-01-18 RX ORDER — OXYCODONE AND ACETAMINOPHEN 5; 325 MG/1; MG/1
1 TABLET ORAL
Qty: 6 TABLET | Refills: 0 | Status: SHIPPED | OUTPATIENT
Start: 2023-01-18 | End: 2023-01-21

## 2023-01-18 RX ORDER — OXYCODONE AND ACETAMINOPHEN 5; 325 MG/1; MG/1
1 TABLET ORAL
Qty: 6 TABLET | Refills: 0 | Status: SHIPPED | OUTPATIENT
Start: 2023-01-18 | End: 2023-01-18 | Stop reason: SDUPTHER

## 2023-01-18 RX ORDER — LIDOCAINE 50 MG/G
PATCH TOPICAL
Qty: 30 EACH | Refills: 0 | Status: SHIPPED | OUTPATIENT
Start: 2023-01-18

## 2023-01-18 RX ORDER — ONDANSETRON 4 MG/1
4 TABLET, ORALLY DISINTEGRATING ORAL
Qty: 20 TABLET | Refills: 0 | Status: SHIPPED | OUTPATIENT
Start: 2023-01-18

## 2023-01-18 RX ORDER — OXYCODONE AND ACETAMINOPHEN 5; 325 MG/1; MG/1
1 TABLET ORAL
Status: COMPLETED | OUTPATIENT
Start: 2023-01-18 | End: 2023-01-18

## 2023-01-18 RX ORDER — KETOROLAC TROMETHAMINE 30 MG/ML
15 INJECTION, SOLUTION INTRAMUSCULAR; INTRAVENOUS
Status: COMPLETED | OUTPATIENT
Start: 2023-01-18 | End: 2023-01-18

## 2023-01-18 RX ORDER — ONDANSETRON 4 MG/1
4 TABLET, ORALLY DISINTEGRATING ORAL
Qty: 20 TABLET | Refills: 0 | Status: SHIPPED | OUTPATIENT
Start: 2023-01-18 | End: 2023-01-18 | Stop reason: SDUPTHER

## 2023-01-18 RX ORDER — LIDOCAINE 50 MG/G
PATCH TOPICAL
Qty: 30 EACH | Refills: 0 | Status: SHIPPED | OUTPATIENT
Start: 2023-01-18 | End: 2023-01-18 | Stop reason: SDUPTHER

## 2023-01-18 RX ORDER — ONDANSETRON 2 MG/ML
4 INJECTION INTRAMUSCULAR; INTRAVENOUS
Status: COMPLETED | OUTPATIENT
Start: 2023-01-18 | End: 2023-01-18

## 2023-01-18 RX ADMIN — OXYCODONE AND ACETAMINOPHEN 1 TABLET: 5; 325 TABLET ORAL at 13:50

## 2023-01-18 RX ADMIN — SODIUM CHLORIDE 1000 ML: 9 INJECTION, SOLUTION INTRAVENOUS at 13:49

## 2023-01-18 RX ADMIN — ONDANSETRON HYDROCHLORIDE 4 MG: 2 SOLUTION INTRAMUSCULAR; INTRAVENOUS at 13:50

## 2023-01-18 RX ADMIN — OXYCODONE AND ACETAMINOPHEN 1 TABLET: 5; 325 TABLET ORAL at 15:34

## 2023-01-18 RX ADMIN — IOPAMIDOL 100 ML: 755 INJECTION, SOLUTION INTRAVENOUS at 14:20

## 2023-01-18 RX ADMIN — KETOROLAC TROMETHAMINE 15 MG: 30 INJECTION, SOLUTION INTRAMUSCULAR; INTRAVENOUS at 15:31

## 2023-01-18 NOTE — DISCHARGE INSTRUCTIONS
Clear liquid diet today and then you may advance back towards a normal diet if tolerated. Follow tomorrow with your primary care doctor as discussed and return to the emergency department for any new or worsening symptoms. No strenuous activity or heavy lifting.

## 2023-01-18 NOTE — ED TRIAGE NOTES
Pt c/o right upper abd pian x 3 days, +nausea, denies fever, denies diarrhea or constipation, denies urinary symptoms, pt feels lightheaded, pt appears uncomfortable, denies back pain

## 2023-01-19 NOTE — ED PROVIDER NOTES
Patient is a pleasant 61-year-old male with past medical history significant for hyperlipidemia, hypertension, cholelithiasis status post cholecystectomy, paresthesias who presents the ED for right-sided abdominal pain. He states for the past few days he has had nausea but then today started to have right-sided abdominal pain with vomiting. No hemoptysis, hematemesis, hematochezia or melena noted. Denies any fevers but states that he has been feeling rundown. Denies any obvious blood in the urine or history of renal stones. No chest pain.   denies any injury       Past Medical History:   Diagnosis Date    Cholelithiasis 2011    High cholesterol     Hypertension     Left sided numbness 2022    Other ill-defined conditions(799.89)     hypercholesterolemia    Paresthesia 2022    Psychiatric disorder     anxiety    Seasonal allergies     Status post laparoscopic cholecystectomy 9       Past Surgical History:   Procedure Laterality Date    HX CHOLECYSTECTOMY      HX ORTHOPAEDIC      back srgery, arm surgery    MD LAP,CHOLECYSTECTOMY  9         Family History:   Problem Relation Age of Onset    Heart Disease Mother     Headache Mother     Heart Disease Maternal Grandfather     Heart Disease Paternal Grandmother     Headache Sister     Headache Brother        Social History     Socioeconomic History    Marital status:      Spouse name: Not on file    Number of children: Not on file    Years of education: Not on file    Highest education level: Not on file   Occupational History    Not on file   Tobacco Use    Smoking status: Former     Types: Cigarettes     Quit date: 2020     Years since quittin.6    Smokeless tobacco: Former     Quit date: 2001   Vaping Use    Vaping Use: Never used   Substance and Sexual Activity    Alcohol use: Not Currently     Comment: quick     Drug use: Not Currently     Types: Marijuana     Comment: quit     Sexual activity: Yes Partners: Female   Other Topics Concern    Not on file   Social History Narrative    ** Merged History Encounter **          Social Determinants of Health     Financial Resource Strain: Not on file   Food Insecurity: Not on file   Transportation Needs: Not on file   Physical Activity: Not on file   Stress: Not on file   Social Connections: Not on file   Intimate Partner Violence: Not on file   Housing Stability: Not on file         ALLERGIES: Codeine, Dilaudid [hydromorphone (bulk)], and Morphine    Review of Systems   Constitutional:  Positive for fatigue. Negative for fever. HENT:  Negative for drooling. Respiratory:  Negative for shortness of breath. Cardiovascular:  Negative for chest pain. Gastrointestinal:  Positive for abdominal pain, nausea and vomiting. Negative for blood in stool. Genitourinary:  Negative for dysuria, hematuria, scrotal swelling and testicular pain. Musculoskeletal:  Negative for back pain and neck pain. Skin:  Negative for rash. Neurological:  Positive for light-headedness. Negative for syncope. Psychiatric/Behavioral: Negative. Vitals:    01/18/23 1247 01/18/23 1515 01/18/23 1516 01/18/23 1518   BP: (!) 153/119 126/80 126/79 122/65   Pulse: 62 62 74 76   Resp: 20 20 20 20   Temp: 97.8 °F (36.6 °C) 97.6 °F (36.4 °C)     SpO2: 96% 96% 96% 96%            Physical Exam  Vitals and nursing note reviewed. Constitutional:       Appearance: He is obese. HENT:      Head: Normocephalic and atraumatic. Eyes:      General: No scleral icterus. Cardiovascular:      Rate and Rhythm: Normal rate. Pulmonary:      Effort: Pulmonary effort is normal.      Breath sounds: Normal breath sounds. Abdominal:      General: Abdomen is protuberant. Bowel sounds are normal.      Palpations: Abdomen is soft. Tenderness: There is abdominal tenderness in the right lower quadrant and periumbilical area. There is no guarding or rebound. Skin:     Findings: No rash. Neurological:      Mental Status: He is alert and oriented to person, place, and time. Psychiatric:         Mood and Affect: Mood normal.         Behavior: Behavior normal.        Medical Decision Making  Patient is a 61-year-old male with presentation for abdominal pain and nausea vomiting. Differential diagnosis includes renal stone, appendicitis, enteritis, colitis, UTI, hernia, abdominal wall strain. Imaging is reviewed and reassuring. Jovany Milton at bedside does add that patient exercises daily so I do question the possibility of abdominal wall muscle strain. No evidence of shingles on today's exam however patient advised to watch the area for any sign of rash developing. Symptoms improved dramatically with medicines given and stable for discharge at this time however patient given strict return precautions. Amount and/or Complexity of Data Reviewed  Independent Historian: spouse  Labs: ordered. Radiology: ordered. ECG/medicine tests: ordered and independent interpretation performed. Risk  Prescription drug management. Parenteral controlled substances. ED Course as of 01/19/23 1239   Wed Jan 18, 2023   1505 EKG obtained at 1250 reported independently by myself showing sinus rhythm with first-degree AV block, rate 70 no acute appearing findings, no significant changes compared to prior EKG.  [JE]      ED Course User Index  [JE] Justus Moran MD       Procedures

## 2023-03-01 ENCOUNTER — HOSPITAL ENCOUNTER (EMERGENCY)
Age: 58
Discharge: HOME | End: 2023-03-01
Attending: EMERGENCY MEDICINE
Payer: MEDICAID

## 2023-03-01 VITALS
RESPIRATION RATE: 18 BRPM | WEIGHT: 268.52 LBS | DIASTOLIC BLOOD PRESSURE: 77 MMHG | SYSTOLIC BLOOD PRESSURE: 120 MMHG | BODY MASS INDEX: 37.59 KG/M2 | HEIGHT: 71 IN | HEART RATE: 86 BPM | OXYGEN SATURATION: 96 % | TEMPERATURE: 97.8 F

## 2023-03-01 DIAGNOSIS — R19.7 DIARRHEA OF PRESUMED INFECTIOUS ORIGIN: Primary | ICD-10-CM

## 2023-03-01 DIAGNOSIS — I95.1 ORTHOSTATIC HYPOTENSION: ICD-10-CM

## 2023-03-01 LAB
ALBUMIN SERPL-MCNC: 3 G/DL (ref 3.5–5)
ALBUMIN/GLOB SERPL: 1 (ref 1.1–2.2)
ALP SERPL-CCNC: 76 U/L (ref 45–117)
ALT SERPL-CCNC: 36 U/L (ref 12–78)
ANION GAP SERPL CALC-SCNC: 6 MMOL/L (ref 5–15)
AST SERPL-CCNC: 26 U/L (ref 15–37)
ATRIAL RATE: 65 BPM
BASOPHILS # BLD: 0 K/UL (ref 0–0.1)
BASOPHILS NFR BLD: 0 % (ref 0–1)
BILIRUB SERPL-MCNC: 0.4 MG/DL (ref 0.2–1)
BNP SERPL-MCNC: 13 PG/ML
BUN SERPL-MCNC: 20 MG/DL (ref 6–20)
BUN/CREAT SERPL: 14 (ref 12–20)
CALCIUM SERPL-MCNC: 8.4 MG/DL (ref 8.5–10.1)
CALCULATED P AXIS, ECG09: 51 DEGREES
CALCULATED R AXIS, ECG10: 65 DEGREES
CALCULATED T AXIS, ECG11: 68 DEGREES
CHLORIDE SERPL-SCNC: 103 MMOL/L (ref 97–108)
CO2 SERPL-SCNC: 26 MMOL/L (ref 21–32)
COMMENT, HOLDF: NORMAL
CREAT SERPL-MCNC: 1.4 MG/DL (ref 0.7–1.3)
DIAGNOSIS, 93000: NORMAL
DIFFERENTIAL METHOD BLD: ABNORMAL
EOSINOPHIL # BLD: 0.1 K/UL (ref 0–0.4)
EOSINOPHIL NFR BLD: 2 % (ref 0–7)
ERYTHROCYTE [DISTWIDTH] IN BLOOD BY AUTOMATED COUNT: 13.2 % (ref 11.5–14.5)
GLOBULIN SER CALC-MCNC: 3.1 G/DL (ref 2–4)
GLUCOSE SERPL-MCNC: 101 MG/DL (ref 65–100)
HCT VFR BLD AUTO: 41.1 % (ref 36.6–50.3)
HGB BLD-MCNC: 13.4 G/DL (ref 12.1–17)
IMM GRANULOCYTES # BLD AUTO: 0.1 K/UL (ref 0–0.04)
IMM GRANULOCYTES NFR BLD AUTO: 1 % (ref 0–0.5)
LYMPHOCYTES # BLD: 1.6 K/UL (ref 0.8–3.5)
LYMPHOCYTES NFR BLD: 27 % (ref 12–49)
MAGNESIUM SERPL-MCNC: 1.9 MG/DL (ref 1.6–2.4)
MCH RBC QN AUTO: 31.1 PG (ref 26–34)
MCHC RBC AUTO-ENTMCNC: 32.6 G/DL (ref 30–36.5)
MCV RBC AUTO: 95.4 FL (ref 80–99)
MONOCYTES # BLD: 0.8 K/UL (ref 0–1)
MONOCYTES NFR BLD: 13 % (ref 5–13)
NEUTS SEG # BLD: 3.4 K/UL (ref 1.8–8)
NEUTS SEG NFR BLD: 57 % (ref 32–75)
NRBC # BLD: 0 K/UL (ref 0–0.01)
NRBC BLD-RTO: 0 PER 100 WBC
P-R INTERVAL, ECG05: 196 MS
PLATELET # BLD AUTO: 98 K/UL (ref 150–400)
PMV BLD AUTO: 10 FL (ref 8.9–12.9)
POTASSIUM SERPL-SCNC: 4 MMOL/L (ref 3.5–5.1)
PROT SERPL-MCNC: 6.1 G/DL (ref 6.4–8.2)
Q-T INTERVAL, ECG07: 402 MS
QRS DURATION, ECG06: 90 MS
QTC CALCULATION (BEZET), ECG08: 418 MS
RBC # BLD AUTO: 4.31 M/UL (ref 4.1–5.7)
RBC MORPH BLD: ABNORMAL
SAMPLES BEING HELD,HOLD: NORMAL
SODIUM SERPL-SCNC: 135 MMOL/L (ref 136–145)
TROPONIN I SERPL HS-MCNC: <3 NG/L (ref 0–57)
VENTRICULAR RATE, ECG03: 65 BPM
WBC # BLD AUTO: 6 K/UL (ref 4.1–11.1)

## 2023-03-01 PROCEDURE — 80053 COMPREHEN METABOLIC PANEL: CPT

## 2023-03-01 PROCEDURE — 74011000250 HC RX REV CODE- 250: Performed by: EMERGENCY MEDICINE

## 2023-03-01 PROCEDURE — 84484 ASSAY OF TROPONIN QUANT: CPT

## 2023-03-01 PROCEDURE — 36415 COLL VENOUS BLD VENIPUNCTURE: CPT

## 2023-03-01 PROCEDURE — 83880 ASSAY OF NATRIURETIC PEPTIDE: CPT

## 2023-03-01 PROCEDURE — 83735 ASSAY OF MAGNESIUM: CPT

## 2023-03-01 PROCEDURE — 85025 COMPLETE CBC W/AUTO DIFF WBC: CPT

## 2023-03-01 PROCEDURE — 93005 ELECTROCARDIOGRAM TRACING: CPT

## 2023-03-01 PROCEDURE — 99284 EMERGENCY DEPT VISIT MOD MDM: CPT

## 2023-03-01 RX ORDER — IPRATROPIUM BROMIDE AND ALBUTEROL SULFATE 2.5; .5 MG/3ML; MG/3ML
3 SOLUTION RESPIRATORY (INHALATION)
Status: COMPLETED | OUTPATIENT
Start: 2023-03-01 | End: 2023-03-01

## 2023-03-01 RX ORDER — ALBUTEROL SULFATE 90 UG/1
4 AEROSOL, METERED RESPIRATORY (INHALATION)
Qty: 1 EACH | Refills: 0 | Status: SHIPPED | OUTPATIENT
Start: 2023-03-01

## 2023-03-01 RX ADMIN — IPRATROPIUM BROMIDE AND ALBUTEROL SULFATE 3 ML: 2.5; .5 SOLUTION RESPIRATORY (INHALATION) at 12:13

## 2023-03-01 NOTE — DISCHARGE INSTRUCTIONS
Please restart your blood pressure medications starting with carvedilol and then your lisinopril and then your amlodipine 1 at a time each day as tolerated by your blood pressure readings at home. If you pass out again or your blood pressure goes low please return.

## 2023-03-01 NOTE — ED TRIAGE NOTES
PT presents to the ed VIA EMS w/ complaints of multiple syncopal events and hypotension. Pt states he has been coughing since Saturday and has started to \"pass out for a couple seconds\" d/t coughing. Pt seen at PCP this morning for cough and was sent to ED for pressures 70s/40s on arrival. Pt arrives in ED w/ BP of 111/79 and had received 500ccs NS from EMS. Pt denies chest pain, states SOB has gotten worsen. Hx of COPD-not o2 dependent.      BS w/

## 2023-03-02 NOTE — ED PROVIDER NOTES
The history is provided by the patient and the spouse. Dizziness  This is a new problem. The current episode started more than 2 days ago. The problem has not changed since onset. There was no focality noted. Primary symptoms comment: cough and shortness of breath, had some diarrhea yesterday and has had some syncopal episodes. There has been no fever. Associated symptoms include shortness of breath. Meds prior to arrival: BP meds. Shortness of Breath  This is a new problem. The average episode lasts 1 week. The problem occurs continuously. Associated symptoms include cough and wheezing. Pertinent negatives include no fever. Associated medical issues include COPD (patient endorses).       Past Medical History:   Diagnosis Date    Cholelithiasis 2011    High cholesterol     Hypertension     Left sided numbness 2022    Other ill-defined conditions(799.89)     hypercholesterolemia    Paresthesia 2022    Psychiatric disorder     anxiety    Seasonal allergies     Status post laparoscopic cholecystectomy 11       Past Surgical History:   Procedure Laterality Date    HX CHOLECYSTECTOMY      HX ORTHOPAEDIC      back srgery, arm surgery    NE LAP,CHOLECYSTECTOMY  11         Family History:   Problem Relation Age of Onset    Heart Disease Mother     Headache Mother     Heart Disease Maternal Grandfather     Heart Disease Paternal Grandmother     Headache Sister     Headache Brother        Social History     Socioeconomic History    Marital status:      Spouse name: Not on file    Number of children: Not on file    Years of education: Not on file    Highest education level: Not on file   Occupational History    Not on file   Tobacco Use    Smoking status: Former     Types: Cigarettes     Quit date: 2020     Years since quittin.8    Smokeless tobacco: Former     Quit date: 2001   Vaping Use    Vaping Use: Never used   Substance and Sexual Activity    Alcohol use: Not Currently Comment: quick 2019    Drug use: Not Currently     Types: Marijuana     Comment: quit 2000    Sexual activity: Yes     Partners: Female   Other Topics Concern    Not on file   Social History Narrative    ** Merged History Encounter **          Social Determinants of Health     Financial Resource Strain: Not on file   Food Insecurity: Not on file   Transportation Needs: Not on file   Physical Activity: Not on file   Stress: Not on file   Social Connections: Not on file   Intimate Partner Violence: Not on file   Housing Stability: Not on file         ALLERGIES: Codeine, Dilaudid [hydromorphone (bulk)], and Morphine    Review of Systems   Constitutional:  Negative for fever. Respiratory:  Positive for cough, shortness of breath and wheezing. Neurological:  Positive for dizziness. Vitals:    03/01/23 1130 03/01/23 1200 03/01/23 1213 03/01/23 1440   BP: 113/73 (!) 109/91 (!) 109/91 120/77   Pulse: 68 65 74 86   Resp: 18 18 20 18   Temp:    97.8 °F (36.6 °C)   SpO2: 96% 95% 95% 96%   Weight:       Height:                Physical Exam  Vitals and nursing note reviewed. Constitutional:       General: He is not in acute distress. Appearance: He is well-developed. HENT:      Head: Normocephalic and atraumatic. Eyes:      Conjunctiva/sclera: Conjunctivae normal.   Neck:      Trachea: No tracheal deviation. Cardiovascular:      Rate and Rhythm: Normal rate and regular rhythm. Heart sounds: Normal heart sounds. Pulmonary:      Effort: Pulmonary effort is normal. No respiratory distress. Breath sounds: Wheezing (faint bilateral expiratory) present. Abdominal:      General: There is no distension. Musculoskeletal:         General: No deformity. Normal range of motion. Cervical back: Neck supple. Skin:     General: Skin is warm and dry. Neurological:      Mental Status: He is alert. Cranial Nerves: No cranial nerve deficit.    Psychiatric:         Behavior: Behavior normal. Medical Decision Making  59 y.o. male presents with syncopal episode today and a few episodes earlier in the week after having coughing episodes. EKG reviewed by me is normal sinus rhythm and rate, without evidence of ST or T wave changes to suggest myocardial ischemia, no delta wave, no brugada, no prolonged QT to suggest arrhythmogenicity. No evidence of CHF with normal BNP. Low risk f=without signs or symptoms of PE. No significant hematologic or metabolic abnormalities to explain symptoms. He had diarrheal symptoms and suspect he is slightly volume depleted. He had no hypotension here but was mildly orthostatic before completion of fluids when this resolved. Breathing symptoms improved with a single breathing treatment. He may have a mild URI but do not feel he warrants steroid therapy at this time. Discussed holding antihypertensives and reintroducing them in a stepwise fashion depending on his BP as I feel they are overly effective at the moment given his cold and his recent diarrhea. Discussed oral rehydration at home. Plan to follow up with PCP as needed and return precautions discussed for worsening or new concerning symptoms. Problems Addressed:  Diarrhea of presumed infectious origin: acute illness or injury  Orthostatic hypotension: acute illness or injury    Amount and/or Complexity of Data Reviewed  Labs: ordered. Decision-making details documented in ED Course. ECG/medicine tests: ordered and independent interpretation performed. Decision-making details documented in ED Course. Risk  Prescription drug management. Procedures      EKG 1040: Rate 65, Normal sinus rhythm, No ST segment or T wave abnormalities. Normal EKG.

## 2023-07-24 ENCOUNTER — TRANSCRIBE ORDERS (OUTPATIENT)
Facility: HOSPITAL | Age: 58
End: 2023-07-24

## 2023-07-24 DIAGNOSIS — R91.8 LUNG MASS: Primary | ICD-10-CM

## 2023-07-27 ENCOUNTER — HOSPITAL ENCOUNTER (OUTPATIENT)
Facility: HOSPITAL | Age: 58
Discharge: HOME | End: 2023-07-27
Attending: INTERNAL MEDICINE
Payer: MEDICARE

## 2023-07-27 ENCOUNTER — HOSPITAL ENCOUNTER (OUTPATIENT)
Facility: HOSPITAL | Age: 58
End: 2023-07-27
Attending: INTERNAL MEDICINE
Payer: MEDICARE

## 2023-07-27 VITALS — WEIGHT: 264 LBS | BODY MASS INDEX: 36.82 KG/M2

## 2023-07-27 DIAGNOSIS — R93.89 ABNORMAL COMPUTERIZED AXIAL TOMOGRAPHY OF CHEST: ICD-10-CM

## 2023-07-27 LAB
GLUCOSE BLD STRIP.AUTO-MCNC: 85 MG/DL (ref 65–117)
SERVICE CMNT-IMP: NORMAL

## 2023-07-27 PROCEDURE — 3430000000 HC RX DIAGNOSTIC RADIOPHARMACEUTICAL: Performed by: INTERNAL MEDICINE

## 2023-07-27 PROCEDURE — A9552 F18 FDG: HCPCS | Performed by: INTERNAL MEDICINE

## 2023-07-27 PROCEDURE — 82962 GLUCOSE BLOOD TEST: CPT

## 2023-07-27 PROCEDURE — 78815 PET IMAGE W/CT SKULL-THIGH: CPT

## 2023-07-27 RX ORDER — FLUDEOXYGLUCOSE F-18 500 MCI/ML
10 INJECTION INTRAVENOUS
Status: COMPLETED | OUTPATIENT
Start: 2023-07-27 | End: 2023-07-27

## 2023-07-27 RX ADMIN — FLUDEOXYGLUCOSE F-18 10 MILLICURIE: 500 INJECTION INTRAVENOUS at 07:05

## 2023-10-31 ENCOUNTER — APPOINTMENT (OUTPATIENT)
Facility: HOSPITAL | Age: 58
End: 2023-10-31
Payer: MEDICARE

## 2023-10-31 ENCOUNTER — HOSPITAL ENCOUNTER (OUTPATIENT)
Facility: HOSPITAL | Age: 58
Setting detail: OBSERVATION
Discharge: HOME | End: 2023-11-01
Attending: EMERGENCY MEDICINE | Admitting: INTERNAL MEDICINE
Payer: MEDICARE

## 2023-10-31 DIAGNOSIS — R07.89 OTHER CHEST PAIN: ICD-10-CM

## 2023-10-31 DIAGNOSIS — R07.9 CHEST PAIN, UNSPECIFIED TYPE: Primary | ICD-10-CM

## 2023-10-31 LAB
ALBUMIN SERPL-MCNC: 3.2 G/DL (ref 3.5–5)
ALBUMIN/GLOB SERPL: 0.9 (ref 1.1–2.2)
ALP SERPL-CCNC: 100 U/L (ref 45–117)
ALT SERPL-CCNC: 31 U/L (ref 12–78)
ANION GAP SERPL CALC-SCNC: 4 MMOL/L (ref 5–15)
AST SERPL-CCNC: 17 U/L (ref 15–37)
BASOPHILS # BLD: 0.1 K/UL (ref 0–0.1)
BASOPHILS NFR BLD: 1 % (ref 0–1)
BILIRUB SERPL-MCNC: 0.4 MG/DL (ref 0.2–1)
BUN SERPL-MCNC: 11 MG/DL (ref 6–20)
BUN/CREAT SERPL: 10 (ref 12–20)
CALCIUM SERPL-MCNC: 9.1 MG/DL (ref 8.5–10.1)
CHLORIDE SERPL-SCNC: 109 MMOL/L (ref 97–108)
CO2 SERPL-SCNC: 28 MMOL/L (ref 21–32)
CREAT SERPL-MCNC: 1.11 MG/DL (ref 0.7–1.3)
D DIMER PPP FEU-MCNC: 0.48 MG/L FEU (ref 0–0.65)
DIFFERENTIAL METHOD BLD: ABNORMAL
EOSINOPHIL # BLD: 0.2 K/UL (ref 0–0.4)
EOSINOPHIL NFR BLD: 2 % (ref 0–7)
ERYTHROCYTE [DISTWIDTH] IN BLOOD BY AUTOMATED COUNT: 13.8 % (ref 11.5–14.5)
GLOBULIN SER CALC-MCNC: 3.4 G/DL (ref 2–4)
GLUCOSE SERPL-MCNC: 74 MG/DL (ref 65–100)
HCT VFR BLD AUTO: 43.9 % (ref 36.6–50.3)
HGB BLD-MCNC: 15 G/DL (ref 12.1–17)
IMM GRANULOCYTES # BLD AUTO: 0.1 K/UL (ref 0–0.04)
IMM GRANULOCYTES NFR BLD AUTO: 1 % (ref 0–0.5)
INR PPP: 1 (ref 0.9–1.1)
LYMPHOCYTES # BLD: 2.5 K/UL (ref 0.8–3.5)
LYMPHOCYTES NFR BLD: 29 % (ref 12–49)
MCH RBC QN AUTO: 31.6 PG (ref 26–34)
MCHC RBC AUTO-ENTMCNC: 34.2 G/DL (ref 30–36.5)
MCV RBC AUTO: 92.4 FL (ref 80–99)
MONOCYTES # BLD: 0.8 K/UL (ref 0–1)
MONOCYTES NFR BLD: 10 % (ref 5–13)
NEUTS SEG # BLD: 5.1 K/UL (ref 1.8–8)
NEUTS SEG NFR BLD: 58 % (ref 32–75)
NRBC # BLD: 0 K/UL (ref 0–0.01)
NRBC BLD-RTO: 0 PER 100 WBC
PLATELET # BLD AUTO: 136 K/UL (ref 150–400)
PMV BLD AUTO: 10.4 FL (ref 8.9–12.9)
POTASSIUM SERPL-SCNC: 4.1 MMOL/L (ref 3.5–5.1)
PROT SERPL-MCNC: 6.6 G/DL (ref 6.4–8.2)
PROTHROMBIN TIME: 10.7 SEC (ref 9–11.1)
RBC # BLD AUTO: 4.75 M/UL (ref 4.1–5.7)
SODIUM SERPL-SCNC: 141 MMOL/L (ref 136–145)
TROPONIN I SERPL HS-MCNC: 4 NG/L (ref 0–76)
TROPONIN I SERPL HS-MCNC: 5 NG/L (ref 0–76)
WBC # BLD AUTO: 8.8 K/UL (ref 4.1–11.1)

## 2023-10-31 PROCEDURE — 85610 PROTHROMBIN TIME: CPT

## 2023-10-31 PROCEDURE — 2580000003 HC RX 258: Performed by: INTERNAL MEDICINE

## 2023-10-31 PROCEDURE — 6370000000 HC RX 637 (ALT 250 FOR IP): Performed by: EMERGENCY MEDICINE

## 2023-10-31 PROCEDURE — G0378 HOSPITAL OBSERVATION PER HR: HCPCS

## 2023-10-31 PROCEDURE — 80053 COMPREHEN METABOLIC PANEL: CPT

## 2023-10-31 PROCEDURE — 96361 HYDRATE IV INFUSION ADD-ON: CPT

## 2023-10-31 PROCEDURE — 6370000000 HC RX 637 (ALT 250 FOR IP): Performed by: INTERNAL MEDICINE

## 2023-10-31 PROCEDURE — 96375 TX/PRO/DX INJ NEW DRUG ADDON: CPT

## 2023-10-31 PROCEDURE — 93005 ELECTROCARDIOGRAM TRACING: CPT | Performed by: EMERGENCY MEDICINE

## 2023-10-31 PROCEDURE — 85379 FIBRIN DEGRADATION QUANT: CPT

## 2023-10-31 PROCEDURE — 84484 ASSAY OF TROPONIN QUANT: CPT

## 2023-10-31 PROCEDURE — 6360000004 HC RX CONTRAST MEDICATION: Performed by: EMERGENCY MEDICINE

## 2023-10-31 PROCEDURE — 99285 EMERGENCY DEPT VISIT HI MDM: CPT

## 2023-10-31 PROCEDURE — 96376 TX/PRO/DX INJ SAME DRUG ADON: CPT

## 2023-10-31 PROCEDURE — 85025 COMPLETE CBC W/AUTO DIFF WBC: CPT

## 2023-10-31 PROCEDURE — 36415 COLL VENOUS BLD VENIPUNCTURE: CPT

## 2023-10-31 PROCEDURE — 6360000002 HC RX W HCPCS: Performed by: EMERGENCY MEDICINE

## 2023-10-31 PROCEDURE — 71045 X-RAY EXAM CHEST 1 VIEW: CPT

## 2023-10-31 PROCEDURE — 96374 THER/PROPH/DIAG INJ IV PUSH: CPT

## 2023-10-31 PROCEDURE — 71275 CT ANGIOGRAPHY CHEST: CPT

## 2023-10-31 RX ORDER — SODIUM CHLORIDE 0.9 % (FLUSH) 0.9 %
5-40 SYRINGE (ML) INJECTION PRN
Status: DISCONTINUED | OUTPATIENT
Start: 2023-10-31 | End: 2023-11-01 | Stop reason: HOSPADM

## 2023-10-31 RX ORDER — CLONAZEPAM 0.5 MG/1
2 TABLET ORAL 2 TIMES DAILY
Status: DISCONTINUED | OUTPATIENT
Start: 2023-10-31 | End: 2023-11-01 | Stop reason: HOSPADM

## 2023-10-31 RX ORDER — ARIPIPRAZOLE 5 MG/1
15 TABLET ORAL EVERY EVENING
Status: DISCONTINUED | OUTPATIENT
Start: 2023-10-31 | End: 2023-11-01 | Stop reason: HOSPADM

## 2023-10-31 RX ORDER — ACETAMINOPHEN 325 MG/1
650 TABLET ORAL EVERY 6 HOURS PRN
Status: DISCONTINUED | OUTPATIENT
Start: 2023-10-31 | End: 2023-11-01 | Stop reason: HOSPADM

## 2023-10-31 RX ORDER — SODIUM CHLORIDE 9 MG/ML
INJECTION, SOLUTION INTRAVENOUS CONTINUOUS
Status: DISCONTINUED | OUTPATIENT
Start: 2023-10-31 | End: 2023-11-01

## 2023-10-31 RX ORDER — MORPHINE SULFATE 4 MG/ML
4 INJECTION, SOLUTION INTRAMUSCULAR; INTRAVENOUS
Status: COMPLETED | OUTPATIENT
Start: 2023-10-31 | End: 2023-10-31

## 2023-10-31 RX ORDER — ENOXAPARIN SODIUM 100 MG/ML
40 INJECTION SUBCUTANEOUS DAILY
Status: DISCONTINUED | OUTPATIENT
Start: 2023-10-31 | End: 2023-10-31

## 2023-10-31 RX ORDER — ENOXAPARIN SODIUM 100 MG/ML
30 INJECTION SUBCUTANEOUS 2 TIMES DAILY
Status: DISCONTINUED | OUTPATIENT
Start: 2023-11-01 | End: 2023-11-01 | Stop reason: HOSPADM

## 2023-10-31 RX ORDER — ONDANSETRON 2 MG/ML
4 INJECTION INTRAMUSCULAR; INTRAVENOUS ONCE
Status: COMPLETED | OUTPATIENT
Start: 2023-10-31 | End: 2023-10-31

## 2023-10-31 RX ORDER — SODIUM CHLORIDE 0.9 % (FLUSH) 0.9 %
5-40 SYRINGE (ML) INJECTION EVERY 12 HOURS SCHEDULED
Status: DISCONTINUED | OUTPATIENT
Start: 2023-10-31 | End: 2023-11-01 | Stop reason: HOSPADM

## 2023-10-31 RX ORDER — NITROGLYCERIN 0.4 MG/1
0.4 TABLET SUBLINGUAL EVERY 5 MIN PRN
Status: DISCONTINUED | OUTPATIENT
Start: 2023-10-31 | End: 2023-11-01 | Stop reason: HOSPADM

## 2023-10-31 RX ORDER — FLUOXETINE HYDROCHLORIDE 20 MG/1
40 CAPSULE ORAL 2 TIMES DAILY
Status: DISCONTINUED | OUTPATIENT
Start: 2023-10-31 | End: 2023-11-01 | Stop reason: HOSPADM

## 2023-10-31 RX ORDER — ZOLPIDEM TARTRATE 5 MG/1
10 TABLET ORAL NIGHTLY PRN
Status: DISCONTINUED | OUTPATIENT
Start: 2023-10-31 | End: 2023-11-01 | Stop reason: HOSPADM

## 2023-10-31 RX ORDER — CARVEDILOL 6.25 MG/1
6.25 TABLET ORAL 2 TIMES DAILY WITH MEALS
Status: DISCONTINUED | OUTPATIENT
Start: 2023-10-31 | End: 2023-11-01 | Stop reason: HOSPADM

## 2023-10-31 RX ORDER — AMLODIPINE BESYLATE 5 MG/1
5 TABLET ORAL DAILY
Status: DISCONTINUED | OUTPATIENT
Start: 2023-11-01 | End: 2023-11-01 | Stop reason: HOSPADM

## 2023-10-31 RX ORDER — ASPIRIN 81 MG/1
81 TABLET, CHEWABLE ORAL DAILY
Status: DISCONTINUED | OUTPATIENT
Start: 2023-11-01 | End: 2023-11-01 | Stop reason: HOSPADM

## 2023-10-31 RX ORDER — ONDANSETRON 4 MG/1
4 TABLET, ORALLY DISINTEGRATING ORAL EVERY 8 HOURS PRN
Status: DISCONTINUED | OUTPATIENT
Start: 2023-10-31 | End: 2023-11-01 | Stop reason: HOSPADM

## 2023-10-31 RX ORDER — SODIUM CHLORIDE 9 MG/ML
INJECTION, SOLUTION INTRAVENOUS PRN
Status: DISCONTINUED | OUTPATIENT
Start: 2023-10-31 | End: 2023-11-01 | Stop reason: HOSPADM

## 2023-10-31 RX ORDER — MAGNESIUM HYDROXIDE/ALUMINUM HYDROXICE/SIMETHICONE 120; 1200; 1200 MG/30ML; MG/30ML; MG/30ML
30 SUSPENSION ORAL EVERY 6 HOURS PRN
Status: DISCONTINUED | OUTPATIENT
Start: 2023-10-31 | End: 2023-11-01 | Stop reason: HOSPADM

## 2023-10-31 RX ORDER — ACETAMINOPHEN 650 MG/1
650 SUPPOSITORY RECTAL EVERY 6 HOURS PRN
Status: DISCONTINUED | OUTPATIENT
Start: 2023-10-31 | End: 2023-11-01 | Stop reason: HOSPADM

## 2023-10-31 RX ORDER — GABAPENTIN 300 MG/1
900 CAPSULE ORAL 2 TIMES DAILY
Status: DISCONTINUED | OUTPATIENT
Start: 2023-10-31 | End: 2023-11-01 | Stop reason: HOSPADM

## 2023-10-31 RX ORDER — DIPHENHYDRAMINE HYDROCHLORIDE 50 MG/ML
25 INJECTION INTRAMUSCULAR; INTRAVENOUS
Status: COMPLETED | OUTPATIENT
Start: 2023-10-31 | End: 2023-10-31

## 2023-10-31 RX ORDER — ALBUTEROL SULFATE 2.5 MG/3ML
2.5 SOLUTION RESPIRATORY (INHALATION) EVERY 4 HOURS PRN
Status: DISCONTINUED | OUTPATIENT
Start: 2023-10-31 | End: 2023-11-01 | Stop reason: HOSPADM

## 2023-10-31 RX ORDER — ONDANSETRON 2 MG/ML
4 INJECTION INTRAMUSCULAR; INTRAVENOUS EVERY 6 HOURS PRN
Status: DISCONTINUED | OUTPATIENT
Start: 2023-10-31 | End: 2023-11-01 | Stop reason: HOSPADM

## 2023-10-31 RX ORDER — MONTELUKAST SODIUM 10 MG/1
10 TABLET ORAL DAILY
Status: DISCONTINUED | OUTPATIENT
Start: 2023-11-01 | End: 2023-11-01 | Stop reason: HOSPADM

## 2023-10-31 RX ORDER — POLYETHYLENE GLYCOL 3350 17 G/17G
17 POWDER, FOR SOLUTION ORAL DAILY PRN
Status: DISCONTINUED | OUTPATIENT
Start: 2023-10-31 | End: 2023-11-01 | Stop reason: HOSPADM

## 2023-10-31 RX ORDER — IPRATROPIUM BROMIDE AND ALBUTEROL SULFATE 2.5; .5 MG/3ML; MG/3ML
1 SOLUTION RESPIRATORY (INHALATION) EVERY 4 HOURS PRN
Status: DISCONTINUED | OUTPATIENT
Start: 2023-10-31 | End: 2023-11-01 | Stop reason: HOSPADM

## 2023-10-31 RX ORDER — ATORVASTATIN CALCIUM 40 MG/1
40 TABLET, FILM COATED ORAL NIGHTLY
Status: DISCONTINUED | OUTPATIENT
Start: 2023-10-31 | End: 2023-11-01 | Stop reason: HOSPADM

## 2023-10-31 RX ADMIN — CLONAZEPAM 2 MG: 0.5 TABLET ORAL at 22:53

## 2023-10-31 RX ADMIN — ONDANSETRON 4 MG: 2 INJECTION INTRAMUSCULAR; INTRAVENOUS at 10:04

## 2023-10-31 RX ADMIN — IOPAMIDOL 100 ML: 755 INJECTION, SOLUTION INTRAVENOUS at 14:56

## 2023-10-31 RX ADMIN — MORPHINE SULFATE 4 MG: 4 INJECTION, SOLUTION INTRAMUSCULAR; INTRAVENOUS at 10:04

## 2023-10-31 RX ADMIN — DIPHENHYDRAMINE HYDROCHLORIDE 25 MG: 50 INJECTION INTRAMUSCULAR; INTRAVENOUS at 10:04

## 2023-10-31 RX ADMIN — FLUOXETINE 40 MG: 20 CAPSULE ORAL at 22:54

## 2023-10-31 RX ADMIN — ACETAMINOPHEN 650 MG: 325 TABLET ORAL at 22:59

## 2023-10-31 RX ADMIN — ATORVASTATIN CALCIUM 40 MG: 40 TABLET, FILM COATED ORAL at 22:54

## 2023-10-31 RX ADMIN — MORPHINE SULFATE 4 MG: 4 INJECTION, SOLUTION INTRAMUSCULAR; INTRAVENOUS at 12:40

## 2023-10-31 RX ADMIN — SODIUM CHLORIDE: 9 INJECTION, SOLUTION INTRAVENOUS at 15:08

## 2023-10-31 RX ADMIN — NITROGLYCERIN 0.5 INCH: 20 OINTMENT TOPICAL at 12:40

## 2023-10-31 RX ADMIN — SODIUM CHLORIDE, PRESERVATIVE FREE 10 ML: 5 INJECTION INTRAVENOUS at 22:53

## 2023-10-31 RX ADMIN — GABAPENTIN 900 MG: 300 CAPSULE ORAL at 22:53

## 2023-10-31 RX ADMIN — ARIPIPRAZOLE 15 MG: 5 TABLET ORAL at 17:40

## 2023-10-31 ASSESSMENT — ENCOUNTER SYMPTOMS
VOMITING: 0
ABDOMINAL PAIN: 0
SHORTNESS OF BREATH: 1
COUGH: 0
COLOR CHANGE: 0
NAUSEA: 1

## 2023-10-31 ASSESSMENT — PAIN DESCRIPTION - LOCATION
LOCATION: CHEST
LOCATION: HEAD

## 2023-10-31 ASSESSMENT — PAIN SCALES - GENERAL
PAINLEVEL_OUTOF10: 9
PAINLEVEL_OUTOF10: 7
PAINLEVEL_OUTOF10: 8
PAINLEVEL_OUTOF10: 8

## 2023-10-31 ASSESSMENT — PAIN DESCRIPTION - ORIENTATION: ORIENTATION: MID

## 2023-10-31 ASSESSMENT — PAIN DESCRIPTION - DESCRIPTORS: DESCRIPTORS: THROBBING

## 2023-10-31 NOTE — ED NOTES
Attempted to call report at this time. No one answered the phone on Oncology unit.      Bertha [place]  46/77/03 [id number]

## 2023-10-31 NOTE — CONSULTS
IP Cardiology Consult       Date of consult:  10/31/23  Date of admission: 10/31/2023  Primary Cardiologist: Dr Arline Toney    Physician Requesting consult: Dr Chance Kill:     Chest pain, unclear etiology   Mild hypoxia    Problem List:  1.  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 HTN  3. XOL  4. GERD  5. Former EtOH abuse  6. COPD  7. Former smoker; Q '23    , 2 kids, no e/t/d, on disability. Chest pain of unclear etiology,    Troponin is unremarkable. No EKG changes. may be related to GI as he developed this pain after eating. It appears he had similar pain 2 year ago and OP cath showed no significant CAD in 1/2021. Troponin negative   CT scan pending       Recommendations:     Continue to trend cardiac enzymes   Continue aspirin   Continue Lipitor   Continue Coreg and amlodipine  CTA is pending   Consider outpatient GI evaluation  decide need for further cardiac eval  based on further hospital course. Will discuss with  primary cardiologist, Dr Arline Toney, who will follow up with patient tomorrow. [x]        High complexity decision making was performed      CC / Reason for consult:  chest pain    History of the presenting illness:  Bryce Zepeda is a 59 y.o.  male with past medical history of hypertension, hyperlipidemia presented to emergency room with chest pain. He reports going to gym this morning and then returned home, he ate something and after that he started having chest pressure on the left side with radiation to arm and back. This persisted so he came to emergency room. He was also feeling short of breath. It was difficult for him to take a deep breath. He checked his oxygen level at home and was on the lower side. He does have COPD. He was placed on nitro paste and given morphine. His pain is easing off. His EKG was unremarkable.   Troponin was normal.    He has previous chest pain on the left side with radiation to

## 2023-10-31 NOTE — ED NOTES
Room air O2 sat at this time is 94%. Dr Nick Clarke reports seeing sat go down to 93% while she was in room.   Requesting to have patient placed on 2L Shea Nina  10/31/23 8951

## 2023-10-31 NOTE — ED NOTES
Report given to [facility], RN at this time. All questions answered. Transport requested at this time.       Adriel Mazariegos RN  10/31/23 8911

## 2023-10-31 NOTE — H&P
Hospitalist Admission Note    NAME:   Diogo Kimball   : 1965   MRN: [id number]     Date/Time: 10/31/2023 1:59 PM    Patient PCP: Orion Cárdenas MD    ______________________________________________________________________  Given the patient's current clinical presentation, I have a high level of concern for decompensation if discharged from the emergency department. Complex decision making was performed, which includes reviewing the patient's available past medical records, laboratory results, and x-ray films. My assessment of this patient's clinical condition and my plan of care is as follows. Assessment / Plan:  Chest pain rule out ACS. History of coronary artery spasm/chest pain syndrome  This is a patient with multiple comorbidities presenting with chest pressure radiating to the back and arm with nausea and dyspnea. EKG does not show any acute ischemic changes, troponin has remained flat. The patient's chest pain is better with nitroglycerin and IV morphine. Continue to monitor patient in telemetry. Continue to trend troponin. Cardiology consult. Started on aspirin 81 mg daily and continue with Lipitor 40 mg. We will check for A1c, lipid panel. We will also get echocardiogram.  The patient may need stress test versus cardiac cath. Gentle hydration with normal saline at 100 cc/h for renal protective effect as patient got some IV contrast for CT angiogram pulmonary. Scattered groundglass opacities in right lung  Atelectasis  Start on incentive spirometer. Continue with nebs treatment. Continue with Singulair. Hypoxia  Patient briefly required 2 L of oxygen in the ED. CVA  HTN  HLD  Continue with amlodipine, Coreg. Continue Lipitor. Continue with lisinopril. Hold HCTZ as patient is getting gentle hydration. Depression  Insomnia  Continue with Abilify, Klonopin, fluoxetine, Ambien. Neuropathy  Continue with Neurontin.         Medical Decision Making:   I personally

## 2023-10-31 NOTE — ED PROVIDER NOTES
[facility] EMERGENCY DEPT  EMERGENCY DEPARTMENT ENCOUNTER       Pt Name: Urbano Gardner  MRN: [id number]  [address] [facility] 1965  Date of evaluation: 10/31/2023  Provider: Dominique Shelton MD   PCP: Harry Garcia MD  Note Started: 10:10am     [address]       Chief Complaint   Patient presents with    Chest Pain     Around 0800 began having CP that radiates to L arm while eating breakfast, also reports SOB, dizziness, and nausea; took 3 tabs SL nitro PTA        HISTORY OF PRESENT ILLNESS: 1 or more elements      History From: Patient and Patient's Wife  HPI Limitations: None     Urbano Gardner is a 59 y.o. male who presents with sudden onset CP after breakfast associated with nausea and SOB. Pain is central, radiating to his back and right shoulder. Did not respond to nitro x3 and zofran. Nursing Notes were all reviewed and agreed with or any disagreements were addressed in the HPI. REVIEW OF SYSTEMS      Review of Systems   Constitutional:  Negative for activity change, appetite change, chills, fatigue and fever. HENT:  Negative for congestion. Respiratory:  Positive for shortness of breath. Negative for cough. Cardiovascular:  Positive for chest pain. Gastrointestinal:  Positive for nausea. Negative for abdominal pain and vomiting. Genitourinary:  Negative for difficulty urinating. Skin:  Negative for color change and rash. Positives and Pertinent negatives as per HPI.     PAST HISTORY     Past Medical History:  Past Medical History:   Diagnosis Date    Cholelithiasis 8/22/2011    High cholesterol     Hypertension     Left sided numbness 5/5/2022    Other ill-defined conditions(799.89)     hypercholesterolemia    Paresthesia 5/4/2022    Psychiatric disorder     anxiety    Seasonal allergies     Status post laparoscopic cholecystectomy 8/25/11         Past Surgical History:  Past Surgical History:   Procedure Laterality Date    CHOLECYSTECTOMY      LAP,CHOLECYSTECTOMY  8/25/11    ORTHOPEDIC

## 2023-11-01 ENCOUNTER — APPOINTMENT (OUTPATIENT)
Facility: HOSPITAL | Age: 58
End: 2023-11-01
Attending: INTERNAL MEDICINE
Payer: MEDICARE

## 2023-11-01 VITALS
OXYGEN SATURATION: 96 % | TEMPERATURE: 97.7 F | WEIGHT: 270.95 LBS | BODY MASS INDEX: 37.93 KG/M2 | DIASTOLIC BLOOD PRESSURE: 75 MMHG | HEIGHT: 71 IN | RESPIRATION RATE: 18 BRPM | HEART RATE: 90 BPM | SYSTOLIC BLOOD PRESSURE: 133 MMHG

## 2023-11-01 LAB
ALBUMIN SERPL-MCNC: 2.8 G/DL (ref 3.5–5)
ALBUMIN/GLOB SERPL: 1 (ref 1.1–2.2)
ALP SERPL-CCNC: 93 U/L (ref 45–117)
ALT SERPL-CCNC: 28 U/L (ref 12–78)
ANION GAP SERPL CALC-SCNC: 3 MMOL/L (ref 5–15)
AST SERPL-CCNC: 16 U/L (ref 15–37)
BILIRUB SERPL-MCNC: 0.4 MG/DL (ref 0.2–1)
BUN SERPL-MCNC: 11 MG/DL (ref 6–20)
BUN/CREAT SERPL: 11 (ref 12–20)
CALCIUM SERPL-MCNC: 8.2 MG/DL (ref 8.5–10.1)
CHLORIDE SERPL-SCNC: 112 MMOL/L (ref 97–108)
CHOLEST SERPL-MCNC: 114 MG/DL
CO2 SERPL-SCNC: 26 MMOL/L (ref 21–32)
CREAT SERPL-MCNC: 1.03 MG/DL (ref 0.7–1.3)
EKG ATRIAL RATE: 81 BPM
EKG DIAGNOSIS: NORMAL
EKG P AXIS: 37 DEGREES
EKG P-R INTERVAL: 220 MS
EKG Q-T INTERVAL: 380 MS
EKG QRS DURATION: 92 MS
EKG QTC CALCULATION (BAZETT): 441 MS
EKG R AXIS: 34 DEGREES
EKG T AXIS: 48 DEGREES
EKG VENTRICULAR RATE: 81 BPM
ERYTHROCYTE [DISTWIDTH] IN BLOOD BY AUTOMATED COUNT: 13.8 % (ref 11.5–14.5)
GLOBULIN SER CALC-MCNC: 2.9 G/DL (ref 2–4)
GLUCOSE SERPL-MCNC: 112 MG/DL (ref 65–100)
HCT VFR BLD AUTO: 42.9 % (ref 36.6–50.3)
HDLC SERPL-MCNC: 38 MG/DL
HDLC SERPL: 3 (ref 0–5)
HGB BLD-MCNC: 14.1 G/DL (ref 12.1–17)
LDLC SERPL CALC-MCNC: 57.2 MG/DL (ref 0–100)
MAGNESIUM SERPL-MCNC: 2.2 MG/DL (ref 1.6–2.4)
MCH RBC QN AUTO: 30.9 PG (ref 26–34)
MCHC RBC AUTO-ENTMCNC: 32.9 G/DL (ref 30–36.5)
MCV RBC AUTO: 93.9 FL (ref 80–99)
NRBC # BLD: 0 K/UL (ref 0–0.01)
NRBC BLD-RTO: 0 PER 100 WBC
PLATELET # BLD AUTO: 119 K/UL (ref 150–400)
PMV BLD AUTO: 10.7 FL (ref 8.9–12.9)
POTASSIUM SERPL-SCNC: 4.4 MMOL/L (ref 3.5–5.1)
PROT SERPL-MCNC: 5.7 G/DL (ref 6.4–8.2)
RBC # BLD AUTO: 4.57 M/UL (ref 4.1–5.7)
SODIUM SERPL-SCNC: 141 MMOL/L (ref 136–145)
TRIGL SERPL-MCNC: 94 MG/DL
TROPONIN I SERPL HS-MCNC: 5 NG/L (ref 0–76)
VLDLC SERPL CALC-MCNC: 18.8 MG/DL
WBC # BLD AUTO: 6.1 K/UL (ref 4.1–11.1)

## 2023-11-01 PROCEDURE — 85027 COMPLETE CBC AUTOMATED: CPT

## 2023-11-01 PROCEDURE — 2580000003 HC RX 258: Performed by: INTERNAL MEDICINE

## 2023-11-01 PROCEDURE — 83735 ASSAY OF MAGNESIUM: CPT

## 2023-11-01 PROCEDURE — 84484 ASSAY OF TROPONIN QUANT: CPT

## 2023-11-01 PROCEDURE — 96361 HYDRATE IV INFUSION ADD-ON: CPT

## 2023-11-01 PROCEDURE — 2700000000 HC OXYGEN THERAPY PER DAY

## 2023-11-01 PROCEDURE — 94760 N-INVAS EAR/PLS OXIMETRY 1: CPT

## 2023-11-01 PROCEDURE — 80061 LIPID PANEL: CPT

## 2023-11-01 PROCEDURE — G0378 HOSPITAL OBSERVATION PER HR: HCPCS

## 2023-11-01 PROCEDURE — 6370000000 HC RX 637 (ALT 250 FOR IP): Performed by: INTERNAL MEDICINE

## 2023-11-01 PROCEDURE — 80053 COMPREHEN METABOLIC PANEL: CPT

## 2023-11-01 PROCEDURE — 36415 COLL VENOUS BLD VENIPUNCTURE: CPT

## 2023-11-01 RX ORDER — ASPIRIN 81 MG/1
81 TABLET, CHEWABLE ORAL DAILY
Qty: 30 TABLET | Refills: 3 | Status: SHIPPED | OUTPATIENT
Start: 2023-11-02

## 2023-11-01 RX ORDER — SELENIUM 50 MCG
1 TABLET ORAL DAILY
Qty: 5 CAPSULE | Refills: 0 | Status: SHIPPED | OUTPATIENT
Start: 2023-11-01

## 2023-11-01 RX ORDER — NITROGLYCERIN 0.4 MG/1
0.4 TABLET SUBLINGUAL EVERY 5 MIN PRN
Qty: 10 TABLET | Refills: 0 | Status: SHIPPED | OUTPATIENT
Start: 2023-11-01

## 2023-11-01 RX ORDER — AZITHROMYCIN 250 MG/1
250 TABLET, FILM COATED ORAL SEE ADMIN INSTRUCTIONS
Qty: 6 TABLET | Refills: 0 | Status: SHIPPED | OUTPATIENT
Start: 2023-11-01 | End: 2023-11-06

## 2023-11-01 RX ORDER — CEFDINIR 300 MG/1
300 CAPSULE ORAL 2 TIMES DAILY
Qty: 10 CAPSULE | Refills: 0 | Status: SHIPPED | OUTPATIENT
Start: 2023-11-01 | End: 2023-11-06

## 2023-11-01 RX ADMIN — FLUOXETINE 40 MG: 20 CAPSULE ORAL at 08:48

## 2023-11-01 RX ADMIN — SODIUM CHLORIDE, PRESERVATIVE FREE 10 ML: 5 INJECTION INTRAVENOUS at 08:49

## 2023-11-01 RX ADMIN — CARVEDILOL 6.25 MG: 6.25 TABLET, FILM COATED ORAL at 08:49

## 2023-11-01 RX ADMIN — AMLODIPINE BESYLATE 5 MG: 5 TABLET ORAL at 08:49

## 2023-11-01 RX ADMIN — CLONAZEPAM 2 MG: 0.5 TABLET ORAL at 08:49

## 2023-11-01 RX ADMIN — MONTELUKAST 10 MG: 10 TABLET, FILM COATED ORAL at 08:49

## 2023-11-01 RX ADMIN — ASPIRIN 81 MG: 81 TABLET, CHEWABLE ORAL at 08:48

## 2023-11-01 RX ADMIN — SODIUM CHLORIDE: 9 INJECTION, SOLUTION INTRAVENOUS at 01:08

## 2023-11-01 RX ADMIN — GABAPENTIN 900 MG: 300 CAPSULE ORAL at 08:48

## 2023-11-01 NOTE — DISCHARGE SUMMARY
day(s)  post hospitalization follow up for CAP.     Tessa Sol, DO  [address]  [address]  [facility] [place]  [phone]    Schedule an appointment as soon as possible for a visit in 1 week(s)  post hospitalization follow up for stress test and echocardiogram.          Total time in minutes spent coordinating this discharge (includes going over instructions, follow-up, prescriptions, and preparing report for sign off to her PCP) :  35 minutes

## 2023-11-01 NOTE — PROGRESS NOTES
.End of Shift Note    Bedside shift change report given to Amanda Wolfe RN (oncoming nurse) by Deejay Paula LPN (offgoing nurse). Report included the following information SBAR, Kardex, and MAR    Shift worked:  7p-7:30a     Shift summary and any significant changes:     Pt tolerated care fairy well. All pt medications administered per MAR. Pt complained of having a headache, PRN tylenol administered. Pt repositioned self, pt call bell within reach. Routine rounding completed.      AM labs collected     Concerns for physician to address:       Zone phone for oncoming shift:                Deejay Paula LPN
Occupational Therapy  Orders received and medical record reviewed. Spoke with PT who reported that pt is up ad elías and has no therapy needs at this time. He is cleared by cardiology for discharge (came to ED for chest pain) and has no concerns re: returning home. Will complete the OT orders as pt is not appropriate to initiate formal OT Evaluation at this time.
Physical Therapy    Received order for jayesh, chart reviewed, spoke with RN. Pt here with CP, now cleared by cardiology for d/c. He has been up ad elías without issue. Spoke with pt who states he has not mobility concerns. Goes to gym everyday. No PT needs. Completed order.     Chris Sánchez PT
Spiritual Care Partner Volunteer visited patient at [facility] in  [facility] on 11/1/2023   Documented by:  ASIA Carrero, [facility], Staff [address] [place] [address]    [address] [place] [address] Paging Service  [phone])
IntraVENous Continuous    aluminum & magnesium hydroxide-simethicone (MAALOX) 200-200-20 MG/5ML suspension 30 mL  30 mL Oral Q6H PRN    nitroGLYCERIN (NITROSTAT) SL tablet 0.4 mg  0.4 mg SubLINGual Q5 Min PRN    enoxaparin Sodium (LOVENOX) injection 30 mg  30 mg SubCUTAneous BID    albuterol (PROVENTIL) (2.5 MG/3ML) 0.083% nebulizer solution 2.5 mg  2.5 mg Nebulization Q4H PRN    amLODIPine (NORVASC) tablet 5 mg  5 mg Oral Daily    ARIPiprazole (ABILIFY) tablet 15 mg  15 mg Oral QPM    carvedilol (COREG) tablet 6.25 mg  6.25 mg Oral BID WC    clonazePAM (KLONOPIN) tablet 2 mg  2 mg Oral BID    FLUoxetine (PROZAC) capsule 40 mg  40 mg Oral BID    gabapentin (NEURONTIN) capsule 900 mg  900 mg Oral BID    montelukast (SINGULAIR) tablet 10 mg  10 mg Oral Daily    zolpidem (AMBIEN) tablet 10 mg  10 mg Oral Nightly PRN    ipratropium 0.5 mg-albuterol 2.5 mg (DUONEB) nebulizer solution 1 Dose  1 Dose Inhalation Q4H PRN         Cholo Montero DO

## 2023-11-08 ENCOUNTER — HOSPITAL ENCOUNTER (EMERGENCY)
Facility: HOSPITAL | Age: 58
Discharge: HOME | End: 2023-11-08
Attending: EMERGENCY MEDICINE
Payer: MEDICARE

## 2023-11-08 VITALS
TEMPERATURE: 98 F | HEIGHT: 71 IN | BODY MASS INDEX: 37.24 KG/M2 | RESPIRATION RATE: 18 BRPM | WEIGHT: 266 LBS | OXYGEN SATURATION: 97 % | HEART RATE: 72 BPM | DIASTOLIC BLOOD PRESSURE: 77 MMHG | SYSTOLIC BLOOD PRESSURE: 110 MMHG

## 2023-11-08 DIAGNOSIS — I20.1 CORONARY ARTERY SPASM (HCC): ICD-10-CM

## 2023-11-08 DIAGNOSIS — I20.1 PRINZMETAL ANGINA (HCC): Primary | ICD-10-CM

## 2023-11-08 LAB
ALBUMIN SERPL-MCNC: 3.1 G/DL (ref 3.5–5)
ALBUMIN/GLOB SERPL: 0.9 (ref 1.1–2.2)
ALP SERPL-CCNC: 111 U/L (ref 45–117)
ALT SERPL-CCNC: 44 U/L (ref 12–78)
ANION GAP SERPL CALC-SCNC: 0 MMOL/L (ref 5–15)
AST SERPL-CCNC: 24 U/L (ref 15–37)
BASOPHILS # BLD: 0.1 K/UL (ref 0–0.1)
BASOPHILS NFR BLD: 1 % (ref 0–1)
BILIRUB SERPL-MCNC: 0.4 MG/DL (ref 0.2–1)
BUN SERPL-MCNC: 10 MG/DL (ref 6–20)
BUN/CREAT SERPL: 9 (ref 12–20)
CALCIUM SERPL-MCNC: 8.6 MG/DL (ref 8.5–10.1)
CHLORIDE SERPL-SCNC: 108 MMOL/L (ref 97–108)
CO2 SERPL-SCNC: 28 MMOL/L (ref 21–32)
CREAT SERPL-MCNC: 1.17 MG/DL (ref 0.7–1.3)
DIFFERENTIAL METHOD BLD: ABNORMAL
EOSINOPHIL # BLD: 0.2 K/UL (ref 0–0.4)
EOSINOPHIL NFR BLD: 3 % (ref 0–7)
ERYTHROCYTE [DISTWIDTH] IN BLOOD BY AUTOMATED COUNT: 13.8 % (ref 11.5–14.5)
GLOBULIN SER CALC-MCNC: 3.6 G/DL (ref 2–4)
GLUCOSE SERPL-MCNC: 96 MG/DL (ref 65–100)
HCT VFR BLD AUTO: 43.3 % (ref 36.6–50.3)
HGB BLD-MCNC: 15 G/DL (ref 12.1–17)
IMM GRANULOCYTES # BLD AUTO: 0.1 K/UL (ref 0–0.04)
IMM GRANULOCYTES NFR BLD AUTO: 1 % (ref 0–0.5)
LYMPHOCYTES # BLD: 2.5 K/UL (ref 0.8–3.5)
LYMPHOCYTES NFR BLD: 31 % (ref 12–49)
MCH RBC QN AUTO: 31.8 PG (ref 26–34)
MCHC RBC AUTO-ENTMCNC: 34.6 G/DL (ref 30–36.5)
MCV RBC AUTO: 91.9 FL (ref 80–99)
MONOCYTES # BLD: 0.7 K/UL (ref 0–1)
MONOCYTES NFR BLD: 9 % (ref 5–13)
NEUTS SEG # BLD: 4.5 K/UL (ref 1.8–8)
NEUTS SEG NFR BLD: 55 % (ref 32–75)
NRBC # BLD: 0 K/UL (ref 0–0.01)
NRBC BLD-RTO: 0 PER 100 WBC
PLATELET # BLD AUTO: 148 K/UL (ref 150–400)
PMV BLD AUTO: 9.8 FL (ref 8.9–12.9)
POTASSIUM SERPL-SCNC: 4.6 MMOL/L (ref 3.5–5.1)
PROT SERPL-MCNC: 6.7 G/DL (ref 6.4–8.2)
RBC # BLD AUTO: 4.71 M/UL (ref 4.1–5.7)
SODIUM SERPL-SCNC: 136 MMOL/L (ref 136–145)
TROPONIN I SERPL HS-MCNC: 4 NG/L (ref 0–76)
TROPONIN I SERPL HS-MCNC: 5 NG/L (ref 0–76)
WBC # BLD AUTO: 8.2 K/UL (ref 4.1–11.1)

## 2023-11-08 PROCEDURE — 80053 COMPREHEN METABOLIC PANEL: CPT

## 2023-11-08 PROCEDURE — 93005 ELECTROCARDIOGRAM TRACING: CPT | Performed by: EMERGENCY MEDICINE

## 2023-11-08 PROCEDURE — 99284 EMERGENCY DEPT VISIT MOD MDM: CPT

## 2023-11-08 PROCEDURE — 2500000003 HC RX 250 WO HCPCS: Performed by: EMERGENCY MEDICINE

## 2023-11-08 PROCEDURE — 96374 THER/PROPH/DIAG INJ IV PUSH: CPT

## 2023-11-08 PROCEDURE — 84484 ASSAY OF TROPONIN QUANT: CPT

## 2023-11-08 PROCEDURE — 85025 COMPLETE CBC W/AUTO DIFF WBC: CPT

## 2023-11-08 PROCEDURE — 6360000002 HC RX W HCPCS: Performed by: EMERGENCY MEDICINE

## 2023-11-08 PROCEDURE — 96375 TX/PRO/DX INJ NEW DRUG ADDON: CPT

## 2023-11-08 PROCEDURE — 36415 COLL VENOUS BLD VENIPUNCTURE: CPT

## 2023-11-08 RX ORDER — ONDANSETRON 2 MG/ML
4 INJECTION INTRAMUSCULAR; INTRAVENOUS ONCE
Status: COMPLETED | OUTPATIENT
Start: 2023-11-08 | End: 2023-11-08

## 2023-11-08 RX ORDER — HYDROMORPHONE HYDROCHLORIDE 1 MG/ML
1 INJECTION, SOLUTION INTRAMUSCULAR; INTRAVENOUS; SUBCUTANEOUS
Status: COMPLETED | OUTPATIENT
Start: 2023-11-08 | End: 2023-11-08

## 2023-11-08 RX ORDER — ONDANSETRON 4 MG/1
4 TABLET, ORALLY DISINTEGRATING ORAL 3 TIMES DAILY PRN
Qty: 20 TABLET | Refills: 1 | Status: SHIPPED | OUTPATIENT
Start: 2023-11-08

## 2023-11-08 RX ADMIN — HYDROMORPHONE HYDROCHLORIDE 1 MG: 1 INJECTION, SOLUTION INTRAMUSCULAR; INTRAVENOUS; SUBCUTANEOUS at 10:12

## 2023-11-08 RX ADMIN — ONDANSETRON 4 MG: 2 INJECTION INTRAMUSCULAR; INTRAVENOUS at 10:13

## 2023-11-08 ASSESSMENT — PAIN SCALES - GENERAL: PAINLEVEL_OUTOF10: 7

## 2023-11-08 ASSESSMENT — PAIN DESCRIPTION - LOCATION: LOCATION: CHEST

## 2023-11-08 NOTE — ED NOTES
Pt discharged by Ananya Anaya RN. Discharge instructions discussed and pt given opportunity to ask questions.  Pt ambulatory out of ED        Lis Smith RN  11/08/23 1491

## 2023-11-09 LAB
EKG ATRIAL RATE: 66 BPM
EKG DIAGNOSIS: NORMAL
EKG P AXIS: 19 DEGREES
EKG P-R INTERVAL: 206 MS
EKG Q-T INTERVAL: 382 MS
EKG QRS DURATION: 92 MS
EKG QTC CALCULATION (BAZETT): 400 MS
EKG R AXIS: 24 DEGREES
EKG T AXIS: 44 DEGREES
EKG VENTRICULAR RATE: 66 BPM

## 2023-11-10 NOTE — ED PROVIDER NOTES
EMERGENCY DEPARTMENT HISTORY AND PHYSICAL EXAM    Date: 11/8/2023  Patient Name: Mireille Rodríguez  Patient Age and Sex: 59 y.o. male  MRN:  [id number]  CSN:  [id number]    History of Present Illness     Chief Complaint   Patient presents with    Chest Pain     Patient came in due to chest pain, riding a bike on 3 miles   [place] a chest pain, took some nitro 2x last is at 0630H but no relief. EMS called        History Provided By: Patient    Ability to gather history was limited by:     HPI: Mireille Rodríguez, 59 y.o. male   With history of high cholesterol, hypertension, reported history of angina secondary to coronary artery spasm, no stents, complains of sudden onset chest pain that started a few hours ago in the setting of riding an exercise bicycle. Took nitroglycerin with no significant relief or change in his symptoms. No nausea or vomiting or shortness of breath. His symptoms here in the emergency department are mild. Tobacco Use      Smoking status: Former        Types: Cigarettes        Quit date: 5/12/2020        Years since quitting: 3.4      Smokeless tobacco: Former     Past History   The patient's medical, surgical, and social history were reviewed by me today. Current Medications:  No current facility-administered medications on file prior to encounter. Current Outpatient Medications on File Prior to Encounter   Medication Sig Dispense Refill    aspirin 81 MG chewable tablet Take 1 tablet by mouth daily 30 tablet 3    nitroGLYCERIN (NITROSTAT) 0.4 MG SL tablet Place 1 tablet under the tongue every 5 minutes as needed for Chest pain up to max of 3 total doses. If no relief after 1 dose, call 911. Place 1 tablet under tongue upon chest pain, wait 5 minutes and may administer a total of 3 doses in 15 minutes. Do not crush or break.  10 tablet 0    Lactobacillus (ACIDOPHILUS) CAPS capsule Take 1 capsule by mouth daily 5 capsule 0    albuterol sulfate HFA (PROVENTIL;VENTOLIN;PROAIR) 108 (90 Base)

## 2024-03-22 RX ORDER — PANTOPRAZOLE SODIUM 20 MG/1
20 TABLET, DELAYED RELEASE ORAL 2 TIMES DAILY
COMMUNITY

## 2024-03-22 RX ORDER — DISULFIRAM 250 MG/1
250 TABLET ORAL DAILY
COMMUNITY

## 2024-03-25 ENCOUNTER — ANESTHESIA (OUTPATIENT)
Facility: HOSPITAL | Age: 59
End: 2024-03-25
Payer: MEDICARE

## 2024-03-25 ENCOUNTER — HOSPITAL ENCOUNTER (OUTPATIENT)
Facility: HOSPITAL | Age: 59
Setting detail: OUTPATIENT SURGERY
Discharge: HOME | End: 2024-03-25
Attending: INTERNAL MEDICINE | Admitting: INTERNAL MEDICINE
Payer: MEDICARE

## 2024-03-25 ENCOUNTER — ANESTHESIA EVENT (OUTPATIENT)
Facility: HOSPITAL | Age: 59
End: 2024-03-25
Payer: MEDICARE

## 2024-03-25 VITALS
TEMPERATURE: 97.9 F | SYSTOLIC BLOOD PRESSURE: 131 MMHG | HEART RATE: 59 BPM | OXYGEN SATURATION: 97 % | DIASTOLIC BLOOD PRESSURE: 73 MMHG | RESPIRATION RATE: 10 BRPM

## 2024-03-25 PROCEDURE — 2709999900 HC NON-CHARGEABLE SUPPLY: Performed by: INTERNAL MEDICINE

## 2024-03-25 PROCEDURE — 3600007502: Performed by: INTERNAL MEDICINE

## 2024-03-25 PROCEDURE — 3700000000 HC ANESTHESIA ATTENDED CARE: Performed by: INTERNAL MEDICINE

## 2024-03-25 PROCEDURE — 7100000011 HC PHASE II RECOVERY - ADDTL 15 MIN: Performed by: INTERNAL MEDICINE

## 2024-03-25 PROCEDURE — 2580000003 HC RX 258: Performed by: INTERNAL MEDICINE

## 2024-03-25 PROCEDURE — 7100000010 HC PHASE II RECOVERY - FIRST 15 MIN: Performed by: INTERNAL MEDICINE

## 2024-03-25 PROCEDURE — 3700000001 HC ADD 15 MINUTES (ANESTHESIA): Performed by: INTERNAL MEDICINE

## 2024-03-25 PROCEDURE — 6360000002 HC RX W HCPCS: Performed by: NURSE ANESTHETIST, CERTIFIED REGISTERED

## 2024-03-25 PROCEDURE — 2500000003 HC RX 250 WO HCPCS: Performed by: NURSE ANESTHETIST, CERTIFIED REGISTERED

## 2024-03-25 PROCEDURE — C1726 CATH, BAL DIL, NON-VASCULAR: HCPCS | Performed by: INTERNAL MEDICINE

## 2024-03-25 PROCEDURE — 3600007512: Performed by: INTERNAL MEDICINE

## 2024-03-25 PROCEDURE — 88305 TISSUE EXAM BY PATHOLOGIST: CPT

## 2024-03-25 RX ORDER — SODIUM CHLORIDE 9 MG/ML
25 INJECTION, SOLUTION INTRAVENOUS PRN
Status: DISCONTINUED | OUTPATIENT
Start: 2024-03-25 | End: 2024-03-25 | Stop reason: HOSPADM

## 2024-03-25 RX ORDER — SODIUM CHLORIDE 0.9 % (FLUSH) 0.9 %
5-40 SYRINGE (ML) INJECTION PRN
Status: DISCONTINUED | OUTPATIENT
Start: 2024-03-25 | End: 2024-03-25 | Stop reason: HOSPADM

## 2024-03-25 RX ORDER — SODIUM CHLORIDE 0.9 % (FLUSH) 0.9 %
5-40 SYRINGE (ML) INJECTION EVERY 12 HOURS SCHEDULED
Status: DISCONTINUED | OUTPATIENT
Start: 2024-03-25 | End: 2024-03-25 | Stop reason: HOSPADM

## 2024-03-25 RX ADMIN — PROPOFOL 50 MG: 10 INJECTION, EMULSION INTRAVENOUS at 07:47

## 2024-03-25 RX ADMIN — PROPOFOL 100 MG: 10 INJECTION, EMULSION INTRAVENOUS at 08:03

## 2024-03-25 RX ADMIN — PROPOFOL 100 MG: 10 INJECTION, EMULSION INTRAVENOUS at 07:44

## 2024-03-25 RX ADMIN — PROPOFOL 50 MG: 10 INJECTION, EMULSION INTRAVENOUS at 07:51

## 2024-03-25 RX ADMIN — SODIUM CHLORIDE: 9 INJECTION, SOLUTION INTRAVENOUS at 07:37

## 2024-03-25 RX ADMIN — PROPOFOL 50 MG: 10 INJECTION, EMULSION INTRAVENOUS at 07:59

## 2024-03-25 RX ADMIN — PROPOFOL 50 MG: 10 INJECTION, EMULSION INTRAVENOUS at 07:54

## 2024-03-25 RX ADMIN — LIDOCAINE HYDROCHLORIDE 100 MG: 20 INJECTION, SOLUTION EPIDURAL; INFILTRATION; INTRACAUDAL; PERINEURAL at 07:44

## 2024-03-25 RX ADMIN — PROPOFOL 20 MG: 10 INJECTION, EMULSION INTRAVENOUS at 08:06

## 2024-03-25 ASSESSMENT — PAIN DESCRIPTION - DESCRIPTORS: DESCRIPTORS: CRAMPING

## 2024-03-25 ASSESSMENT — PAIN DESCRIPTION - ORIENTATION: ORIENTATION: LOWER;LEFT

## 2024-03-25 ASSESSMENT — PAIN SCALES - GENERAL
PAINLEVEL_OUTOF10: 3
PAINLEVEL_OUTOF10: 1
PAINLEVEL_OUTOF10: 3

## 2024-03-25 ASSESSMENT — PAIN - FUNCTIONAL ASSESSMENT: PAIN_FUNCTIONAL_ASSESSMENT: ACTIVITIES ARE NOT PREVENTED

## 2024-03-25 ASSESSMENT — PAIN DESCRIPTION - LOCATION: LOCATION: ABDOMEN

## 2024-03-25 NOTE — PROGRESS NOTES
ARRIVAL INFORMATION:  Verified patient name and date of birth, scheduled procedure, and informed consent.     : Chloe tellez   contact number: [phone]  Physician and staff can share information with the .     Belongings with patient include:  Clothing,Jewelry, ring left on patient , patient states that he has new caps on the top left jaw    GI FOCUSED ASSESSMENT:  Neuro: Awake, alert, oriented x4  Respiratory: even and unlabored   GI: semi soft and non-distended  EKG Rhythm: normal sinus rhythm and PVC's    Education:Reviewed general discharge instructions and  information.

## 2024-03-25 NOTE — H&P
Patient not taking: Reported on 3/25/2024   meclizine (ANTIVERT) 25 MG tablet Unknown  Yes No   Sig: Take by mouth 3 times daily as needed   montelukast (SINGULAIR) 10 MG tablet 3/24/2024  Yes No   Sig: Take 1 tablet by mouth daily   nitroGLYCERIN (NITROSTAT) 0.4 MG SL tablet Unknown  No No   Sig: Place 1 tablet under the tongue every 5 minutes as needed for Chest pain up to max of 3 total doses. If no relief after 1 dose, call 911.Place 1 tablet under tongue upon chest pain, wait 5 minutes and may administer a total of 3 doses in 15 minutes. Do not crush or break.   ondansetron (ZOFRAN-ODT) 4 MG disintegrating tablet Unknown  No No   Sig: Place 1 tablet under the tongue 3 times daily as needed for Nausea or Vomiting   pantoprazole (PROTONIX) 20 MG tablet 3/24/2024  Yes Yes   Sig: Take 1 tablet by mouth in the morning and at bedtime   zolpidem (AMBIEN) 10 MG tablet 3/24/2024  Yes No   Sig: Take by mouth nightly.      Facility-Administered Medications: None           The review of systems is:  Negative  for shortness of breath or chest pain      The heart, lungs, and mental status were satisfactory for the administration of deep sedation and for the procedure.      I discussed with the patient the objectives, risks, consequences and alternatives to the procedure.      Rony Montes MD  3/25/2024  7:34 AM

## 2024-03-25 NOTE — DISCHARGE INSTRUCTIONS
[place] Office: [phone]    Alexandr Bush  [id number]  1965    EGD/COLONOSCOPY DISCHARGE INSTRUCTIONS  Discomfort:  Sore throat- throat lozenges or warm salt water gargle  redness at IV site- apply warm compress to area; if redness or soreness persist- contact your physician  Gaseous discomfort- walking, belching will help relieve any discomfort  You may not operate a vehicle for 12 hours  You may not engage in an occupation involving machinery or appliances for rest of today.  You may not drink alcoholic beverages for at least 12 hours  Avoid making any critical decisions for at least 24 hour  DIET  You may resume your regular diet - however -  remember your colon is empty and a heavy meal will produce gas.   Avoid these foods:  fried / greasy foods, excessive carbonated drinks or too much caffeine  MEDICATIONS   Regarding Aspirin or Nonsteroidal medications specifically, please see below.  ACTIVITY  You may resume your normal daily activities.   Spend the remainder of the day resting -  avoid any strenuous activity.    CALL M.D.  ANY SIGN OF   Increasing pain, nausea, vomiting  Abdominal distension (swelling)  New increased bleeding (oral or rectal)  Fever (chills)  Pain in chest area  Bloody discharge from nose or mouth  Shortness of breath    You may not take any Advil, Aspirin, Ibuprofen, Motrin or Aleve(NSAIDs) for 7 days, ONLY  Tylenol as needed for pain.    Findings:    The Olympus video high-definition colonoscope was advanced to the cecum, identified by its typical land marks, with ease.  Colon is spasmodic.  2 small sessile 2-3 mm transverse colon polyps(too small for snare removal) were removed with a cold forceps.  Mild sigmoid diverticulosis.  Medium to large internal hemorrhoids  The mucosa of the rest of the  colon is normal appearing to the cecum.    EGD:  Esophagus:  The esophageal mucosa in the proximal, mid and distal esophagus is normal. Biopsies were taken to r/o EoE.  Mild GE

## 2024-03-25 NOTE — PROCEDURES
CRE balloon dilatation of the esophagus   15 mm Balloon inflated to 3 ATMs and held for 60 seconds.      No subcutaneous crepitus of the chest or cervical region was noted post dilatation.

## 2024-03-25 NOTE — OP NOTE
Colonoscopy Procedure Note    Alexandr Bush  1965  [id number]    Indications:  Please see below.    Pre-operative Diagnosis: Special screening for malignant neoplasms, colon [Z12.11]    Post-operative Diagnosis: Colon polyps,diverticulosis, internal hemorrhoids      : Elijah Montes MD    Referring Provider: Christy Taylor APRN - NP    Sedation:  MAC anesthesia Propofol        Procedure Details:    After detailed informed consent was obtained with all risks and benefits of procedure explained and preoperative exam completed, the patient was taken to the endoscopy suite and placed in the left lateral decubitus position.  Upon sequential sedation as per above, a digital rectal exam was performed  and was normal.  The Olympus videocolonoscope  was inserted in the rectum and carefully advanced to the cecum, which was identified by the ileocecal valve and appendiceal orifice.   The colonoscope was slowly withdrawn with careful evaluation between folds.   Retroflexion in the rectum was performed.      The quality of preparation was fair to  good    [place] Bowel Preparation Score: 2/2/3  0 = Unprepared colon segment with mucosa not seen due to solid stool that cannot be cleared.  1 = Portion of mucosa of the colon segment seen, but other areas of the colon segment not well seen due to staining, residual stool and/or opaque liquid.  2 = Minor amount of residual staining, small fragments of stool and/or opaque liquid, but mucosa of colon segment seen well.  3 = Entire mucosa of colon segment seen well with no residual staining, small fragments of stool or opaque liquid.    Findings:   The Olympus video high-definition colonoscope was advanced to the cecum, identified by its typical land marks, with ease.  Colon is spasmodic.  2 small sessile 2-3 mm transverse colon polyps(too small for snare removal) were removed with a cold forceps.  Mild sigmoid diverticulosis.  Medium to large internal hemorrhoids  The

## 2024-03-25 NOTE — OP NOTE
[place] Office: [phone]      Esophagogastroduodenoscopy Procedure Note      Alexandr Bush  1965  [id number]    Indication: Chest pain, Mild dysphagia      : Elijah Montes MD    Referring Provider:  Christy Taylor APRN - NP    Sedation:  MAC anesthesia Propofol    Procedure Details:  After detailed informed consent was obtained for the procedure, with all risks and benefits of procedure explained the patient was taken to the endoscopy suite and placed in the left lateral decubitus position.  Following sequential administration of sedation as per above, the endoscope was inserted into the mouth and advanced under direct vision to second portion of the duodenum.  A careful inspection was made as the gastroscope was withdrawn, including a retroflexed view of the proximal stomach; findings and interventions are described below.      Findings:     Esophagus:  The esophageal mucosa in the proximal, mid and distal esophagus is normal. Biopsies were taken to r/o EoE.  Mild GE junction erythema is noted and biopsied.The squamo-columnar junction is at 45 cm where the Z-line was noted.   TTS CRE 15 mm empiric esophageal balloon dilation performed x 1 minute.    Stomach:   The gastric mucosa has erythema in the body and antrum. Biopsies taken  The fundus was found to be normal with no lesions noted on retroflexion.  Endoscopic grading of gastroesophageal flap valve (GEFV)/Hill rdgrdrrdarddrderd:rd rd3rd The angularis is normal    Duodenum:   The bulb and post bulbar mucosa is normal in appearance.   The duodenal folds are normal. Biopsies obtained.    Therapies:    esophageal dilation with 15 mm sized balloon  biopsy of esophagus: as above and SC  junction.  biopsy of stomach body, antrum  biopsy of duodenal distal bulb, second portion    Specimen:     Specimens were collected as described and send to the laboratory.           Complications:   None were encountered during the procedure.    EBL:  None.

## 2024-03-25 NOTE — ANESTHESIA PRE PROCEDURE
Department of Anesthesiology  Preprocedure Note       Name:  Alexandr Bush   Age:  59 y.o.  :  1965                                          MRN:  [id number]         Date:  3/25/2024      Surgeon: Surgeon(s):  Rony Montes MD    Procedure: Procedure(s):  COLONOSCOPY, EGD  ESOPHAGOGASTRODUODENOSCOPY    Medications prior to admission:   Prior to Admission medications    Medication Sig Start Date End Date Taking? Authorizing Provider   disulfiram (ANTABUSE) 250 MG tablet Take 1 tablet by mouth daily   Yes Provider, MD Harry   Fluticasone-Umeclidin-Vilant (TRELEGY ELLIPTA IN) Inhale 1 puff into the lungs daily   Yes Provider, MD Harry   pantoprazole (PROTONIX) 20 MG tablet Take 1 tablet by mouth in the morning and at bedtime   Yes Provider, MD Harry   ondansetron (ZOFRAN-ODT) 4 MG disintegrating tablet Place 1 tablet under the tongue 3 times daily as needed for Nausea or Vomiting 23   Hank Galan MD   aspirin 81 MG chewable tablet Take 1 tablet by mouth daily 23   Sarina Mohan, APRN - NP   nitroGLYCERIN (NITROSTAT) 0.4 MG SL tablet Place 1 tablet under the tongue every 5 minutes as needed for Chest pain up to max of 3 total doses. If no relief after 1 dose, call 911.Place 1 tablet under tongue upon chest pain, wait 5 minutes and may administer a total of 3 doses in 15 minutes. Do not crush or break. 23   Sarina Mohan APRN - NP   Lactobacillus (ACIDOPHILUS) CAPS capsule Take 1 capsule by mouth daily  Patient not taking: Reported on 3/22/2024 11/1/23   Sarina Mohan, RINA - NP   albuterol sulfate HFA (PROVENTIL;VENTOLIN;PROAIR) 108 (90 Base) MCG/ACT inhaler Inhale 4 puffs into the lungs every 4 hours as needed 3/1/23   Automatic Reconciliation, Ar   amLODIPine (NORVASC) 5 MG tablet Take 5 mg by mouth daily    Automatic Reconciliation, Ar   ARIPiprazole (ABILIFY) 15 MG tablet Take 10 mg by mouth every evening    Automatic Reconciliation, Ar

## 2024-03-25 NOTE — ANESTHESIA POSTPROCEDURE EVALUATION
Department of Anesthesiology  Postprocedure Note    Patient: Alexandr Bush  MRN: [id number]  YOB: 1965  Date of evaluation: 3/25/2024    Procedure Summary       Date: 03/25/24 Room / Location: \[facility]\"" ENDO 03 / [facility]    Anesthesia Start: 0737 Anesthesia Stop: 0814    Procedures:       COLONOSCOPY, EGD      ESOPHAGOGASTRODUODENOSCOPY Diagnosis:       Special screening for malignant neoplasms, colon      Other chest pain      (Special screening for malignant neoplasms, colon [Z12.11])      (Other chest pain [R07.89])    Surgeons: Rony Montes MD Responsible Provider: Homar Gamboa MD    Anesthesia Type: MAC ASA Status: 3            Anesthesia Type: MAC    Kyara Phase I: Kyara Score: 10    Kyara Phase II:      Anesthesia Post Evaluation    Patient location during evaluation: PACU  Patient participation: complete - patient participated  Level of consciousness: sleepy but conscious and responsive to verbal stimuli  Pain score: 0  Airway patency: patent  Nausea & Vomiting: no vomiting and no nausea  Cardiovascular status: blood pressure returned to baseline and hemodynamically stable  Respiratory status: acceptable  Hydration status: stable    No notable events documented.

## 2024-03-25 NOTE — PROGRESS NOTES
Alexandr Eleazar  1965  [id number]    Situation:  Verbal report received from: REBECA Bean  Procedure: Procedure(s):  COLONOSCOPY, EGD  ESOPHAGOGASTRODUODENOSCOPY    Background:    Preoperative diagnosis: Special screening for malignant neoplasms, colon [Z12.11]  Other chest pain [R07.89]  Postoperative diagnosis: * No post-op diagnosis entered *    :  Dr. Montes  Assistant(s): Circulator: Geneva Ledesma RN  Endoscopy Technician: Brian Kang    Vital signs stable   Abdominal assessment: round and soft       Patient returned to baseline, vital signs stable (see vital sign flowsheet). Patient offered liquids and tolerated well. Respiratory status within defined limits. Abdomen soft not tender. Skin with in defined limits. Responsible party driving patient home was given the opportunity to ask questions. Patient discharged with documented belongings.

## 2024-07-07 ENCOUNTER — HOSPITAL ENCOUNTER (EMERGENCY)
Facility: HOSPITAL | Age: 59
Discharge: HOME | End: 2024-07-07
Attending: EMERGENCY MEDICINE
Payer: MEDICARE

## 2024-07-07 VITALS
WEIGHT: 263.67 LBS | HEART RATE: 78 BPM | DIASTOLIC BLOOD PRESSURE: 90 MMHG | TEMPERATURE: 97.5 F | SYSTOLIC BLOOD PRESSURE: 135 MMHG | HEIGHT: 71 IN | OXYGEN SATURATION: 98 % | RESPIRATION RATE: 20 BRPM | BODY MASS INDEX: 36.91 KG/M2

## 2024-07-07 DIAGNOSIS — M54.32 SCIATICA OF LEFT SIDE: Primary | ICD-10-CM

## 2024-07-07 PROCEDURE — 99284 EMERGENCY DEPT VISIT MOD MDM: CPT

## 2024-07-07 PROCEDURE — 96372 THER/PROPH/DIAG INJ SC/IM: CPT

## 2024-07-07 PROCEDURE — 6370000000 HC RX 637 (ALT 250 FOR IP): Performed by: EMERGENCY MEDICINE

## 2024-07-07 PROCEDURE — 6360000002 HC RX W HCPCS: Performed by: EMERGENCY MEDICINE

## 2024-07-07 RX ORDER — DIAZEPAM 5 MG/1
5 TABLET ORAL EVERY 6 HOURS PRN
Qty: 12 TABLET | Refills: 0 | Status: SHIPPED | OUTPATIENT
Start: 2024-07-07 | End: 2024-07-10

## 2024-07-07 RX ORDER — DIAZEPAM 5 MG/1
5 TABLET ORAL ONCE
Status: COMPLETED | OUTPATIENT
Start: 2024-07-07 | End: 2024-07-07

## 2024-07-07 RX ORDER — NAPROXEN 500 MG/1
500 TABLET ORAL 2 TIMES DAILY
Qty: 60 TABLET | Refills: 0 | Status: SHIPPED | OUTPATIENT
Start: 2024-07-07

## 2024-07-07 RX ORDER — KETOROLAC TROMETHAMINE 30 MG/ML
30 INJECTION, SOLUTION INTRAMUSCULAR; INTRAVENOUS
Status: COMPLETED | OUTPATIENT
Start: 2024-07-07 | End: 2024-07-07

## 2024-07-07 RX ADMIN — DIAZEPAM 5 MG: 5 TABLET ORAL at 02:31

## 2024-07-07 RX ADMIN — KETOROLAC TROMETHAMINE 30 MG: 30 INJECTION, SOLUTION INTRAMUSCULAR at 02:31

## 2024-07-07 ASSESSMENT — PAIN DESCRIPTION - DESCRIPTORS: DESCRIPTORS: SHOOTING

## 2024-07-07 ASSESSMENT — PAIN DESCRIPTION - PAIN TYPE: TYPE: ACUTE PAIN

## 2024-07-07 ASSESSMENT — PAIN SCALES - GENERAL: PAINLEVEL_OUTOF10: 7

## 2024-07-07 ASSESSMENT — PAIN DESCRIPTION - ORIENTATION: ORIENTATION: LEFT

## 2024-07-07 ASSESSMENT — PAIN DESCRIPTION - LOCATION: LOCATION: HIP;BACK

## 2024-07-07 NOTE — DISCHARGE INSTRUCTIONS
It was a pleasure taking care of you in our Emergency Department today.  We know that when you come to [facility], you are entrusting us with your health, comfort, and safety.  Our physicians and nurses honor that trust, and truly appreciate the opportunity to care for you and your loved ones.      We also value your feedback.  If you receive a survey about your Emergency Department experience today, please fill it out.  We care about our patients' feedback, and we listen to what you have to say.  Thank you!      Dr. Silvia Damian MD.

## 2024-07-07 NOTE — ED NOTES
Discharge instructions reviewed with patient. Patient is alert and oriented at discharge and in no acute distress.

## 2024-07-07 NOTE — ED PROVIDER NOTES
EMERGENCY DEPARTMENT HISTORY AND PHYSICAL EXAM     ----------------------------------------------------------------------------  Please note that this dictation was completed with Page[employer], the computer voice recognition software.  Quite often unanticipated grammatical, syntax, homophones, and other interpretive errors are inadvertently transcribed by the computer software.  Please disregard these errors.  Please excuse any errors that have escaped final proofreading  ----------------------------------------------------------------------------      Date: 7/7/2024  Patient Name: Alexandr Bush      HISTORY OF PRESENT ILLNESS     Chief Complaint   Patient presents with    Back Pain    Hip Pain     Ambulatory with limp into triage with c/o ongoing left-sided back pain that radiates down left hip to mid posterior thigh (x2 weeks). No event occurred this evening other than the patient feels he can't take the discomfort anymore. Pt reports going to [place] ED on 7/1 where they provided XR and steroids. Pt unable to get into ortho until Aug.        History obtainted from:  Patient    Other independent source of history: family    HPI: Alexandr Bush is a 59 y.o. male, with significant pmhx of chronic back pain with sacroiliitis previously followed by [facility], hypertension, cholesterol, COPD, anxiety who presents via private vehicle to the ED with c/o left-sided pain radiating from his buttock through his left thigh.  Patient reports he believes his symptoms became present a week ago after using the weedeater.  Denies any associated fall or other trauma.  Has been taking ibuprofen with minimal relief.  Was seen at the [facility] ED and given steroid Dosepak but notes after taking it for 3 days he felt very jittery and felt as though he should not keep taking them.  Denies bowel or bladder incontinence, numbness or tingling in his groin or inability to ambulate.  Notes that the pain is worse with bearing of weight on that

## 2024-08-10 ENCOUNTER — APPOINTMENT (OUTPATIENT)
Facility: HOSPITAL | Age: 59
End: 2024-08-10
Payer: MEDICARE

## 2024-08-10 ENCOUNTER — HOSPITAL ENCOUNTER (OUTPATIENT)
Facility: HOSPITAL | Age: 59
Setting detail: OBSERVATION
LOS: 2 days | Discharge: HOME | End: 2024-08-12
Attending: STUDENT IN AN ORGANIZED HEALTH CARE EDUCATION/TRAINING PROGRAM | Admitting: INTERNAL MEDICINE
Payer: MEDICARE

## 2024-08-10 DIAGNOSIS — R53.1 LEFT-SIDED WEAKNESS: ICD-10-CM

## 2024-08-10 DIAGNOSIS — R42 DIZZINESS: ICD-10-CM

## 2024-08-10 DIAGNOSIS — I63.9 CEREBROVASCULAR ACCIDENT (CVA), UNSPECIFIED MECHANISM (HCC): Primary | ICD-10-CM

## 2024-08-10 DIAGNOSIS — R20.0 RIGHT FACIAL NUMBNESS: ICD-10-CM

## 2024-08-10 PROBLEM — I61.9 CVA (CEREBROVASCULAR ACCIDENT DUE TO INTRACEREBRAL HEMORRHAGE) (HCC): Status: ACTIVE | Noted: 2024-08-10

## 2024-08-10 LAB
ALBUMIN SERPL-MCNC: 3.2 G/DL (ref 3.5–5)
ALBUMIN/GLOB SERPL: 0.9 (ref 1.1–2.2)
ALP SERPL-CCNC: 118 U/L (ref 45–117)
ALT SERPL-CCNC: 37 U/L (ref 12–78)
ANION GAP SERPL CALC-SCNC: 4 MMOL/L (ref 5–15)
APPEARANCE UR: CLEAR
AST SERPL-CCNC: 14 U/L (ref 15–37)
BACTERIA URNS QL MICRO: NEGATIVE /HPF
BASOPHILS # BLD: 0.1 K/UL (ref 0–0.1)
BASOPHILS NFR BLD: 1 % (ref 0–1)
BILIRUB SERPL-MCNC: 0.6 MG/DL (ref 0.2–1)
BILIRUB UR QL: NEGATIVE
BUN SERPL-MCNC: 11 MG/DL (ref 6–20)
BUN/CREAT SERPL: 10 (ref 12–20)
CALCIUM SERPL-MCNC: 9.2 MG/DL (ref 8.5–10.1)
CHLORIDE SERPL-SCNC: 108 MMOL/L (ref 97–108)
CO2 SERPL-SCNC: 27 MMOL/L (ref 21–32)
COLOR UR: NORMAL
CREAT SERPL-MCNC: 1.11 MG/DL (ref 0.7–1.3)
DIFFERENTIAL METHOD BLD: ABNORMAL
EOSINOPHIL # BLD: 0.2 K/UL (ref 0–0.4)
EOSINOPHIL NFR BLD: 3 % (ref 0–7)
EPITH CASTS URNS QL MICRO: NORMAL /LPF
ERYTHROCYTE [DISTWIDTH] IN BLOOD BY AUTOMATED COUNT: 13.2 % (ref 11.5–14.5)
GLOBULIN SER CALC-MCNC: 3.5 G/DL (ref 2–4)
GLUCOSE BLD STRIP.AUTO-MCNC: 115 MG/DL (ref 65–117)
GLUCOSE SERPL-MCNC: 116 MG/DL (ref 65–100)
GLUCOSE UR STRIP.AUTO-MCNC: NEGATIVE MG/DL
HCT VFR BLD AUTO: 45.5 % (ref 36.6–50.3)
HGB BLD-MCNC: 15.6 G/DL (ref 12.1–17)
HGB UR QL STRIP: NEGATIVE
IMM GRANULOCYTES # BLD AUTO: 0.1 K/UL (ref 0–0.04)
IMM GRANULOCYTES NFR BLD AUTO: 1 % (ref 0–0.5)
INR PPP: 1 (ref 0.9–1.1)
KETONES UR QL STRIP.AUTO: NEGATIVE MG/DL
LEUKOCYTE ESTERASE UR QL STRIP.AUTO: NEGATIVE
LYMPHOCYTES # BLD: 2 K/UL (ref 0.8–3.5)
LYMPHOCYTES NFR BLD: 30 % (ref 12–49)
MCH RBC QN AUTO: 31.1 PG (ref 26–34)
MCHC RBC AUTO-ENTMCNC: 34.3 G/DL (ref 30–36.5)
MCV RBC AUTO: 90.6 FL (ref 80–99)
MONOCYTES # BLD: 0.5 K/UL (ref 0–1)
MONOCYTES NFR BLD: 7 % (ref 5–13)
NEUTS SEG # BLD: 4 K/UL (ref 1.8–8)
NEUTS SEG NFR BLD: 59 % (ref 32–75)
NITRITE UR QL STRIP.AUTO: NEGATIVE
NRBC # BLD: 0 K/UL (ref 0–0.01)
NRBC BLD-RTO: 0 PER 100 WBC
PH UR STRIP: 6 (ref 5–8)
PLATELET # BLD AUTO: 152 K/UL (ref 150–400)
PMV BLD AUTO: 9.4 FL (ref 8.9–12.9)
POTASSIUM SERPL-SCNC: 4.4 MMOL/L (ref 3.5–5.1)
PROT SERPL-MCNC: 6.7 G/DL (ref 6.4–8.2)
PROT UR STRIP-MCNC: NEGATIVE MG/DL
PROTHROMBIN TIME: 10.4 SEC (ref 9–11.1)
RBC # BLD AUTO: 5.02 M/UL (ref 4.1–5.7)
RBC #/AREA URNS HPF: NORMAL /HPF (ref 0–5)
SERVICE CMNT-IMP: NORMAL
SODIUM SERPL-SCNC: 139 MMOL/L (ref 136–145)
SP GR UR REFRACTOMETRY: 1.01
TROPONIN I SERPL HS-MCNC: <4 NG/L (ref 0–76)
URINE CULTURE IF INDICATED: NORMAL
UROBILINOGEN UR QL STRIP.AUTO: 0.2 EU/DL (ref 0.2–1)
WBC # BLD AUTO: 6.8 K/UL (ref 4.1–11.1)
WBC URNS QL MICRO: NORMAL /HPF (ref 0–4)

## 2024-08-10 PROCEDURE — 84484 ASSAY OF TROPONIN QUANT: CPT

## 2024-08-10 PROCEDURE — 70498 CT ANGIOGRAPHY NECK: CPT

## 2024-08-10 PROCEDURE — 2580000003 HC RX 258: Performed by: STUDENT IN AN ORGANIZED HEALTH CARE EDUCATION/TRAINING PROGRAM

## 2024-08-10 PROCEDURE — 6370000000 HC RX 637 (ALT 250 FOR IP): Performed by: NURSE PRACTITIONER

## 2024-08-10 PROCEDURE — 1100000003 HC PRIVATE W/ TELEMETRY

## 2024-08-10 PROCEDURE — 6360000002 HC RX W HCPCS

## 2024-08-10 PROCEDURE — 6360000004 HC RX CONTRAST MEDICATION: Performed by: STUDENT IN AN ORGANIZED HEALTH CARE EDUCATION/TRAINING PROGRAM

## 2024-08-10 PROCEDURE — 6360000002 HC RX W HCPCS: Performed by: NURSE PRACTITIONER

## 2024-08-10 PROCEDURE — 72131 CT LUMBAR SPINE W/O DYE: CPT

## 2024-08-10 PROCEDURE — 2580000003 HC RX 258: Performed by: NURSE PRACTITIONER

## 2024-08-10 PROCEDURE — 94640 AIRWAY INHALATION TREATMENT: CPT

## 2024-08-10 PROCEDURE — 6360000002 HC RX W HCPCS: Performed by: INTERNAL MEDICINE

## 2024-08-10 PROCEDURE — 81001 URINALYSIS AUTO W/SCOPE: CPT

## 2024-08-10 PROCEDURE — 82962 GLUCOSE BLOOD TEST: CPT

## 2024-08-10 PROCEDURE — 93005 ELECTROCARDIOGRAM TRACING: CPT | Performed by: STUDENT IN AN ORGANIZED HEALTH CARE EDUCATION/TRAINING PROGRAM

## 2024-08-10 PROCEDURE — 96374 THER/PROPH/DIAG INJ IV PUSH: CPT

## 2024-08-10 PROCEDURE — 85025 COMPLETE CBC W/AUTO DIFF WBC: CPT

## 2024-08-10 PROCEDURE — 6370000000 HC RX 637 (ALT 250 FOR IP): Performed by: STUDENT IN AN ORGANIZED HEALTH CARE EDUCATION/TRAINING PROGRAM

## 2024-08-10 PROCEDURE — 80053 COMPREHEN METABOLIC PANEL: CPT

## 2024-08-10 PROCEDURE — 70450 CT HEAD/BRAIN W/O DYE: CPT

## 2024-08-10 PROCEDURE — 0042T CT BRAIN PERFUSION: CPT

## 2024-08-10 PROCEDURE — 70551 MRI BRAIN STEM W/O DYE: CPT

## 2024-08-10 PROCEDURE — 85610 PROTHROMBIN TIME: CPT

## 2024-08-10 PROCEDURE — 99285 EMERGENCY DEPT VISIT HI MDM: CPT

## 2024-08-10 PROCEDURE — 36415 COLL VENOUS BLD VENIPUNCTURE: CPT

## 2024-08-10 RX ORDER — ONDANSETRON 2 MG/ML
4 INJECTION INTRAMUSCULAR; INTRAVENOUS EVERY 4 HOURS PRN
Status: DISCONTINUED | OUTPATIENT
Start: 2024-08-10 | End: 2024-08-10

## 2024-08-10 RX ORDER — DISULFIRAM 250 MG/1
250 TABLET ORAL DAILY
Status: DISCONTINUED | OUTPATIENT
Start: 2024-08-11 | End: 2024-08-12 | Stop reason: HOSPADM

## 2024-08-10 RX ORDER — HYDROCODONE BITARTRATE AND ACETAMINOPHEN 7.5; 325 MG/1; MG/1
1 TABLET ORAL EVERY 6 HOURS PRN
Status: DISCONTINUED | OUTPATIENT
Start: 2024-08-10 | End: 2024-08-12 | Stop reason: HOSPADM

## 2024-08-10 RX ORDER — ENOXAPARIN SODIUM 100 MG/ML
30 INJECTION SUBCUTANEOUS 2 TIMES DAILY
Status: DISCONTINUED | OUTPATIENT
Start: 2024-08-10 | End: 2024-08-10

## 2024-08-10 RX ORDER — POLYETHYLENE GLYCOL 3350 17 G/17G
17 POWDER, FOR SOLUTION ORAL DAILY PRN
Status: DISCONTINUED | OUTPATIENT
Start: 2024-08-10 | End: 2024-08-12 | Stop reason: HOSPADM

## 2024-08-10 RX ORDER — 0.9 % SODIUM CHLORIDE 0.9 %
1000 INTRAVENOUS SOLUTION INTRAVENOUS ONCE
Status: COMPLETED | OUTPATIENT
Start: 2024-08-10 | End: 2024-08-10

## 2024-08-10 RX ORDER — ACETAMINOPHEN 325 MG/1
650 TABLET ORAL EVERY 4 HOURS PRN
Status: DISCONTINUED | OUTPATIENT
Start: 2024-08-10 | End: 2024-08-12 | Stop reason: HOSPADM

## 2024-08-10 RX ORDER — ASPIRIN 81 MG/1
81 TABLET, CHEWABLE ORAL DAILY
Status: DISCONTINUED | OUTPATIENT
Start: 2024-08-10 | End: 2024-08-10

## 2024-08-10 RX ORDER — PANTOPRAZOLE SODIUM 40 MG/1
40 TABLET, DELAYED RELEASE ORAL 2 TIMES DAILY
Status: DISCONTINUED | OUTPATIENT
Start: 2024-08-10 | End: 2024-08-12 | Stop reason: HOSPADM

## 2024-08-10 RX ORDER — SODIUM CHLORIDE 9 MG/ML
INJECTION, SOLUTION INTRAVENOUS PRN
Status: DISCONTINUED | OUTPATIENT
Start: 2024-08-10 | End: 2024-08-12 | Stop reason: HOSPADM

## 2024-08-10 RX ORDER — ZOLPIDEM TARTRATE 5 MG/1
10 TABLET ORAL NIGHTLY
Status: DISCONTINUED | OUTPATIENT
Start: 2024-08-10 | End: 2024-08-12 | Stop reason: HOSPADM

## 2024-08-10 RX ORDER — FLUOXETINE HYDROCHLORIDE 20 MG/1
40 CAPSULE ORAL 2 TIMES DAILY
Status: DISCONTINUED | OUTPATIENT
Start: 2024-08-10 | End: 2024-08-12 | Stop reason: HOSPADM

## 2024-08-10 RX ORDER — SODIUM CHLORIDE 0.9 % (FLUSH) 0.9 %
5-40 SYRINGE (ML) INJECTION PRN
Status: DISCONTINUED | OUTPATIENT
Start: 2024-08-10 | End: 2024-08-12 | Stop reason: HOSPADM

## 2024-08-10 RX ORDER — ONDANSETRON 2 MG/ML
4 INJECTION INTRAMUSCULAR; INTRAVENOUS EVERY 6 HOURS PRN
Status: DISCONTINUED | OUTPATIENT
Start: 2024-08-10 | End: 2024-08-12 | Stop reason: HOSPADM

## 2024-08-10 RX ORDER — ONDANSETRON 2 MG/ML
INJECTION INTRAMUSCULAR; INTRAVENOUS
Status: COMPLETED
Start: 2024-08-10 | End: 2024-08-10

## 2024-08-10 RX ORDER — MONTELUKAST SODIUM 10 MG/1
10 TABLET ORAL DAILY
Status: DISCONTINUED | OUTPATIENT
Start: 2024-08-11 | End: 2024-08-12 | Stop reason: HOSPADM

## 2024-08-10 RX ORDER — ATORVASTATIN CALCIUM 40 MG/1
40 TABLET, FILM COATED ORAL DAILY
Status: DISCONTINUED | OUTPATIENT
Start: 2024-08-10 | End: 2024-08-12 | Stop reason: HOSPADM

## 2024-08-10 RX ORDER — ENOXAPARIN SODIUM 100 MG/ML
30 INJECTION SUBCUTANEOUS 2 TIMES DAILY
Status: DISCONTINUED | OUTPATIENT
Start: 2024-08-11 | End: 2024-08-12 | Stop reason: HOSPADM

## 2024-08-10 RX ORDER — GABAPENTIN 300 MG/1
900 CAPSULE ORAL 2 TIMES DAILY
Status: DISCONTINUED | OUTPATIENT
Start: 2024-08-10 | End: 2024-08-12 | Stop reason: HOSPADM

## 2024-08-10 RX ORDER — ONDANSETRON 2 MG/ML
4 INJECTION INTRAMUSCULAR; INTRAVENOUS EVERY 6 HOURS PRN
Status: DISCONTINUED | OUTPATIENT
Start: 2024-08-10 | End: 2024-08-10

## 2024-08-10 RX ORDER — ASPIRIN 325 MG
325 TABLET ORAL
Status: COMPLETED | OUTPATIENT
Start: 2024-08-10 | End: 2024-08-10

## 2024-08-10 RX ORDER — SODIUM CHLORIDE 0.9 % (FLUSH) 0.9 %
5-40 SYRINGE (ML) INJECTION EVERY 12 HOURS SCHEDULED
Status: DISCONTINUED | OUTPATIENT
Start: 2024-08-10 | End: 2024-08-12 | Stop reason: HOSPADM

## 2024-08-10 RX ORDER — SENNA AND DOCUSATE SODIUM 50; 8.6 MG/1; MG/1
2 TABLET, FILM COATED ORAL DAILY PRN
Status: DISCONTINUED | OUTPATIENT
Start: 2024-08-10 | End: 2024-08-12 | Stop reason: HOSPADM

## 2024-08-10 RX ORDER — ONDANSETRON 4 MG/1
4 TABLET, ORALLY DISINTEGRATING ORAL EVERY 8 HOURS PRN
Status: DISCONTINUED | OUTPATIENT
Start: 2024-08-10 | End: 2024-08-12 | Stop reason: HOSPADM

## 2024-08-10 RX ORDER — CLONAZEPAM 1 MG/1
2 TABLET ORAL EVERY 12 HOURS PRN
Status: DISCONTINUED | OUTPATIENT
Start: 2024-08-10 | End: 2024-08-12 | Stop reason: HOSPADM

## 2024-08-10 RX ORDER — IPRATROPIUM BROMIDE AND ALBUTEROL SULFATE 2.5; .5 MG/3ML; MG/3ML
1 SOLUTION RESPIRATORY (INHALATION) EVERY 4 HOURS PRN
Status: DISCONTINUED | OUTPATIENT
Start: 2024-08-10 | End: 2024-08-12 | Stop reason: HOSPADM

## 2024-08-10 RX ORDER — ARIPIPRAZOLE 5 MG/1
10 TABLET ORAL EVERY EVENING
Status: DISCONTINUED | OUTPATIENT
Start: 2024-08-10 | End: 2024-08-12 | Stop reason: HOSPADM

## 2024-08-10 RX ORDER — ASPIRIN 81 MG/1
81 TABLET, CHEWABLE ORAL DAILY
Status: DISCONTINUED | OUTPATIENT
Start: 2024-08-11 | End: 2024-08-10

## 2024-08-10 RX ADMIN — IPRATROPIUM BROMIDE 0.5 MG: 0.5 SOLUTION RESPIRATORY (INHALATION) at 16:47

## 2024-08-10 RX ADMIN — ONDANSETRON 4 MG: 2 INJECTION INTRAMUSCULAR; INTRAVENOUS at 11:10

## 2024-08-10 RX ADMIN — GABAPENTIN 900 MG: 300 CAPSULE ORAL at 14:09

## 2024-08-10 RX ADMIN — ARFORMOTEROL TARTRATE: 15 SOLUTION RESPIRATORY (INHALATION) at 16:47

## 2024-08-10 RX ADMIN — PANTOPRAZOLE SODIUM 40 MG: 40 TABLET, DELAYED RELEASE ORAL at 21:09

## 2024-08-10 RX ADMIN — SODIUM CHLORIDE 1000 ML: 9 INJECTION, SOLUTION INTRAVENOUS at 11:56

## 2024-08-10 RX ADMIN — ENOXAPARIN SODIUM 30 MG: 100 INJECTION SUBCUTANEOUS at 14:10

## 2024-08-10 RX ADMIN — PANTOPRAZOLE SODIUM 40 MG: 40 TABLET, DELAYED RELEASE ORAL at 14:09

## 2024-08-10 RX ADMIN — GABAPENTIN 900 MG: 300 CAPSULE ORAL at 21:09

## 2024-08-10 RX ADMIN — FLUOXETINE HYDROCHLORIDE 40 MG: 20 CAPSULE ORAL at 14:09

## 2024-08-10 RX ADMIN — ARIPIPRAZOLE 10 MG: 5 TABLET ORAL at 18:49

## 2024-08-10 RX ADMIN — FLUOXETINE HYDROCHLORIDE 40 MG: 20 CAPSULE ORAL at 21:09

## 2024-08-10 RX ADMIN — ENOXAPARIN SODIUM 30 MG: 100 INJECTION SUBCUTANEOUS at 21:09

## 2024-08-10 RX ADMIN — IOPAMIDOL 100 ML: 755 INJECTION, SOLUTION INTRAVENOUS at 11:31

## 2024-08-10 RX ADMIN — ATORVASTATIN CALCIUM 40 MG: 40 TABLET, FILM COATED ORAL at 14:09

## 2024-08-10 RX ADMIN — SODIUM CHLORIDE, PRESERVATIVE FREE 10 ML: 5 INJECTION INTRAVENOUS at 21:09

## 2024-08-10 RX ADMIN — ZOLPIDEM TARTRATE 10 MG: 5 TABLET ORAL at 21:09

## 2024-08-10 RX ADMIN — ASPIRIN 325 MG: 325 TABLET ORAL at 11:57

## 2024-08-10 ASSESSMENT — LIFESTYLE VARIABLES
HOW MANY STANDARD DRINKS CONTAINING ALCOHOL DO YOU HAVE ON A TYPICAL DAY: PATIENT DOES NOT DRINK
HOW OFTEN DO YOU HAVE A DRINK CONTAINING ALCOHOL: NEVER

## 2024-08-10 ASSESSMENT — PAIN SCALES - GENERAL: PAINLEVEL_OUTOF10: 0

## 2024-08-10 NOTE — ED PROVIDER NOTES
SHAD Londono MD (electronically signed)      (Please note that parts of this dictation were completed with voice recognition software. Quite often unanticipated grammatical, syntax, homophones, and other interpretive errors are inadvertently transcribed by the computer software. Please disregards these errors. Please excuse any errors that have escaped final proofreading.)         Andre Londono MD  08/11/24 8482

## 2024-08-10 NOTE — PLAN OF CARE
Problem: Respiratory - Adult  Goal: Achieves optimal ventilation and oxygenation  Outcome: Progressing     Problem: Cardiovascular - Adult  Goal: Maintains optimal cardiac output and hemodynamic stability  Outcome: Progressing     Problem: Skin/Tissue Integrity - Adult  Goal: Skin integrity remains intact  Outcome: Progressing     Problem: Musculoskeletal - Adult  Goal: Return mobility to safest level of function  Outcome: Progressing     Problem: Gastrointestinal - Adult  Goal: Minimal or absence of nausea and vomiting  Outcome: Progressing     Problem: Genitourinary - Adult  Goal: Absence of urinary retention  Outcome: Progressing     Problem: Infection - Adult  Goal: Absence of infection at discharge  Outcome: Progressing     Problem: Metabolic/Fluid and Electrolytes - Adult  Goal: Electrolytes maintained within normal limits  Outcome: Progressing     Problem: Hematologic - Adult  Goal: Maintains hematologic stability  Outcome: Progressing

## 2024-08-10 NOTE — H&P
(ABILIFY) 15 MG tablet Take 10 mg by mouth every evening    Automatic Reconciliation, Ar   atorvastatin (LIPITOR) 40 MG tablet Take 40 mg by mouth  Patient not taking: Reported on 3/25/2024 5/6/22   Automatic Reconciliation, Ar   carvedilol (COREG) 6.25 MG tablet Take 6.25 mg by mouth 2 times daily (with meals)  Patient not taking: Reported on 3/25/2024    Automatic Reconciliation, Ar   clonazePAM (KLONOPIN) 2 MG tablet Take by mouth 2 times daily as needed.    Automatic Reconciliation, Ar   FLUoxetine (PROZAC) 40 MG capsule Take 1 capsule by mouth 2 times daily 5/1/22   Automatic Reconciliation, Ar   gabapentin (NEURONTIN) 300 MG capsule Take 3 capsules by mouth 2 times daily.    Automatic Reconciliation, Ar   lidocaine (LIDODERM) 5 % Apply patch to the affected area for 12 hours a day and remove for 12 hours a day.  Patient not taking: Reported on 3/22/2024 1/18/23   Automatic Reconciliation, Ar   lisinopril-hydroCHLOROthiazide (PRINZIDE;ZESTORETIC) 20-12.5 MG per tablet Take 1 tablet by mouth every evening  Patient not taking: Reported on 3/25/2024    Automatic Reconciliation, Ar   meclizine (ANTIVERT) 25 MG tablet Take by mouth 3 times daily as needed    Automatic Reconciliation, Ar   montelukast (SINGULAIR) 10 MG tablet Take 1 tablet by mouth daily    Automatic Reconciliation, Ar   zolpidem (AMBIEN) 10 MG tablet Take by mouth nightly.    Automatic Reconciliation, Ar         Objective:   VITALS:    Patient Vitals for the past 24 hrs:   BP Pulse Resp SpO2 Height Weight   08/10/24 1200 136/82 82 17 93 % -- --   08/10/24 1145 (!) 144/99 86 15 97 % -- --   08/10/24 1045 (!) 143/76 84 20 100 % 1.803 m (5' 11\") 118 kg (260 lb 2.3 oz)       No data recorded.        O2 Device: None (Room air)    Wt Readings from Last 12 Encounters:   08/10/24 118 kg (260 lb 2.3 oz)   07/07/24 119.6 kg (263 lb 10.7 oz)   11/08/23 120.7 kg (266 lb)   10/31/23 122.9 kg (270 lb 15.1 oz)   07/27/23 119.7 kg (264 lb)   05/12/22 122.5 kg (270

## 2024-08-10 NOTE — CODE DOCUMENTATION
Pt reporting LLE weakness that is not new today, weakness is related to back injury that he is being worked up for

## 2024-08-11 LAB
ANION GAP SERPL CALC-SCNC: 4 MMOL/L (ref 5–15)
BUN SERPL-MCNC: 11 MG/DL (ref 6–20)
BUN/CREAT SERPL: 11 (ref 12–20)
CALCIUM SERPL-MCNC: 8.9 MG/DL (ref 8.5–10.1)
CHLORIDE SERPL-SCNC: 110 MMOL/L (ref 97–108)
CHOLEST SERPL-MCNC: 221 MG/DL
CO2 SERPL-SCNC: 24 MMOL/L (ref 21–32)
CREAT SERPL-MCNC: 1.02 MG/DL (ref 0.7–1.3)
EKG ATRIAL RATE: 85 BPM
EKG DIAGNOSIS: NORMAL
EKG P AXIS: 39 DEGREES
EKG P-R INTERVAL: 222 MS
EKG Q-T INTERVAL: 374 MS
EKG QRS DURATION: 90 MS
EKG QTC CALCULATION (BAZETT): 445 MS
EKG R AXIS: 28 DEGREES
EKG T AXIS: 56 DEGREES
EKG VENTRICULAR RATE: 85 BPM
ERYTHROCYTE [DISTWIDTH] IN BLOOD BY AUTOMATED COUNT: 13.2 % (ref 11.5–14.5)
GLUCOSE SERPL-MCNC: 101 MG/DL (ref 65–100)
HCT VFR BLD AUTO: 44.1 % (ref 36.6–50.3)
HDLC SERPL-MCNC: 49 MG/DL
HDLC SERPL: 4.5 (ref 0–5)
HGB BLD-MCNC: 15.4 G/DL (ref 12.1–17)
LDLC SERPL CALC-MCNC: 141 MG/DL (ref 0–100)
MAGNESIUM SERPL-MCNC: 2.1 MG/DL (ref 1.6–2.4)
MCH RBC QN AUTO: 31.4 PG (ref 26–34)
MCHC RBC AUTO-ENTMCNC: 34.9 G/DL (ref 30–36.5)
MCV RBC AUTO: 90 FL (ref 80–99)
NRBC # BLD: 0 K/UL (ref 0–0.01)
NRBC BLD-RTO: 0 PER 100 WBC
PLATELET # BLD AUTO: 147 K/UL (ref 150–400)
PMV BLD AUTO: 9.7 FL (ref 8.9–12.9)
POTASSIUM SERPL-SCNC: 4.4 MMOL/L (ref 3.5–5.1)
RBC # BLD AUTO: 4.9 M/UL (ref 4.1–5.7)
SODIUM SERPL-SCNC: 138 MMOL/L (ref 136–145)
TRIGL SERPL-MCNC: 155 MG/DL
VLDLC SERPL CALC-MCNC: 31 MG/DL
WBC # BLD AUTO: 7.1 K/UL (ref 4.1–11.1)

## 2024-08-11 PROCEDURE — 97535 SELF CARE MNGMENT TRAINING: CPT

## 2024-08-11 PROCEDURE — 2580000003 HC RX 258: Performed by: NURSE PRACTITIONER

## 2024-08-11 PROCEDURE — 97530 THERAPEUTIC ACTIVITIES: CPT

## 2024-08-11 PROCEDURE — 1100000003 HC PRIVATE W/ TELEMETRY

## 2024-08-11 PROCEDURE — 6360000002 HC RX W HCPCS: Performed by: NURSE PRACTITIONER

## 2024-08-11 PROCEDURE — 94760 N-INVAS EAR/PLS OXIMETRY 1: CPT

## 2024-08-11 PROCEDURE — 97165 OT EVAL LOW COMPLEX 30 MIN: CPT

## 2024-08-11 PROCEDURE — 80061 LIPID PANEL: CPT

## 2024-08-11 PROCEDURE — 99222 1ST HOSP IP/OBS MODERATE 55: CPT | Performed by: PSYCHIATRY & NEUROLOGY

## 2024-08-11 PROCEDURE — 80048 BASIC METABOLIC PNL TOTAL CA: CPT

## 2024-08-11 PROCEDURE — 94640 AIRWAY INHALATION TREATMENT: CPT

## 2024-08-11 PROCEDURE — 97116 GAIT TRAINING THERAPY: CPT

## 2024-08-11 PROCEDURE — 6370000000 HC RX 637 (ALT 250 FOR IP): Performed by: NURSE PRACTITIONER

## 2024-08-11 PROCEDURE — 97161 PT EVAL LOW COMPLEX 20 MIN: CPT

## 2024-08-11 PROCEDURE — 85027 COMPLETE CBC AUTOMATED: CPT

## 2024-08-11 PROCEDURE — 83735 ASSAY OF MAGNESIUM: CPT

## 2024-08-11 PROCEDURE — 36415 COLL VENOUS BLD VENIPUNCTURE: CPT

## 2024-08-11 PROCEDURE — 6370000000 HC RX 637 (ALT 250 FOR IP): Performed by: INTERNAL MEDICINE

## 2024-08-11 PROCEDURE — 83036 HEMOGLOBIN GLYCOSYLATED A1C: CPT

## 2024-08-11 PROCEDURE — 6360000002 HC RX W HCPCS: Performed by: INTERNAL MEDICINE

## 2024-08-11 RX ORDER — LIDOCAINE 4 G/G
1 PATCH TOPICAL DAILY
Status: DISCONTINUED | OUTPATIENT
Start: 2024-08-11 | End: 2024-08-12 | Stop reason: HOSPADM

## 2024-08-11 RX ADMIN — ZOLPIDEM TARTRATE 10 MG: 5 TABLET ORAL at 20:44

## 2024-08-11 RX ADMIN — ARIPIPRAZOLE 10 MG: 5 TABLET ORAL at 16:39

## 2024-08-11 RX ADMIN — GABAPENTIN 900 MG: 300 CAPSULE ORAL at 20:44

## 2024-08-11 RX ADMIN — SODIUM CHLORIDE, PRESERVATIVE FREE 10 ML: 5 INJECTION INTRAVENOUS at 20:43

## 2024-08-11 RX ADMIN — FLUOXETINE HYDROCHLORIDE 40 MG: 20 CAPSULE ORAL at 08:47

## 2024-08-11 RX ADMIN — PANTOPRAZOLE SODIUM 40 MG: 40 TABLET, DELAYED RELEASE ORAL at 20:44

## 2024-08-11 RX ADMIN — DISULFIRAM 250 MG: 250 TABLET ORAL at 14:48

## 2024-08-11 RX ADMIN — ENOXAPARIN SODIUM 30 MG: 100 INJECTION SUBCUTANEOUS at 20:43

## 2024-08-11 RX ADMIN — SODIUM CHLORIDE, PRESERVATIVE FREE 10 ML: 5 INJECTION INTRAVENOUS at 08:47

## 2024-08-11 RX ADMIN — PANTOPRAZOLE SODIUM 40 MG: 40 TABLET, DELAYED RELEASE ORAL at 08:47

## 2024-08-11 RX ADMIN — ARFORMOTEROL TARTRATE: 15 SOLUTION RESPIRATORY (INHALATION) at 07:57

## 2024-08-11 RX ADMIN — ENOXAPARIN SODIUM 30 MG: 100 INJECTION SUBCUTANEOUS at 08:47

## 2024-08-11 RX ADMIN — ATORVASTATIN CALCIUM 40 MG: 40 TABLET, FILM COATED ORAL at 08:47

## 2024-08-11 RX ADMIN — IPRATROPIUM BROMIDE 0.5 MG: 0.5 SOLUTION RESPIRATORY (INHALATION) at 07:57

## 2024-08-11 RX ADMIN — MONTELUKAST 10 MG: 10 TABLET, FILM COATED ORAL at 08:47

## 2024-08-11 RX ADMIN — GABAPENTIN 900 MG: 300 CAPSULE ORAL at 08:47

## 2024-08-11 RX ADMIN — CLONAZEPAM 2 MG: 1 TABLET ORAL at 08:52

## 2024-08-11 RX ADMIN — FLUOXETINE HYDROCHLORIDE 40 MG: 20 CAPSULE ORAL at 20:44

## 2024-08-11 NOTE — CARE COORDINATION
Care Management Initial Assessment     RUR: 10%  Readmission? No  1st IM letter given? Yes  1st  letter given: N/a    60 YO White Male admitted on 8/10/24 for Dizziness. Lives in 1-story house (3 [address] w/ bilateral handrails) in [place], [place] w/ spouse and mother-in-law. Currently on SSDI, not working. Reports independent with mobility, transfers, ADLs/IADLs to include driving. Denies hx of HH, SNF/IPR. Has Rw's and cane's at home from mother-in-law, but no other DME for himself. Preferred Rx is [facility] ([address] Has [employer] [facility].     CM met with pt at bedside. Alert & oriented x4. PT/OT evaluations completed, no recommendations for d/c. Still awaiting ECHO to be done. D/c pending medical stability. Reports schedule for injection/procedure at [facility] (Dr. Moya) on Tuesday, wants to ensure he discharges tomorrow so he can keep this appointment if possible. Spouse will transport home when medically stable.     CM will continue to remain available to assist with d/c planning needs     08/11/24 1253   Service Assessment   Patient Orientation Alert and Oriented   Cognition Alert   History Provided By Patient   Primary Caregiver Self   Support Systems Spouse/Significant Other;Family Members  (spouse, mother-in-law)   Patient's Healthcare Decision Maker is: Patient Declined (Legal Next of Kin Remains as Decision Maker)   PCP Verified by CM Yes   Last Visit to PCP Within last 3 months   Prior Functional Level Independent in ADLs/IADLs   Current Functional Level Independent in ADLs/IADLs   Can patient return to prior living arrangement Yes   Family able to assist with home care needs: Yes   Would you like for me to discuss the discharge plan with any other family members/significant others, and if so, who? Yes  (Spouse as needed)   Financial Resources Medicare  ([employer])   Community Resources None   Social/Functional History   Lives With Spouse;Family  (spouse, mother-in-law)   Type of Home House   Home Layout

## 2024-08-12 ENCOUNTER — APPOINTMENT (OUTPATIENT)
Facility: HOSPITAL | Age: 59
End: 2024-08-12
Attending: INTERNAL MEDICINE
Payer: MEDICARE

## 2024-08-12 ENCOUNTER — APPOINTMENT (OUTPATIENT)
Facility: HOSPITAL | Age: 59
End: 2024-08-12
Payer: MEDICARE

## 2024-08-12 VITALS
HEIGHT: 71 IN | HEART RATE: 84 BPM | RESPIRATION RATE: 18 BRPM | BODY MASS INDEX: 36.42 KG/M2 | WEIGHT: 260.14 LBS | SYSTOLIC BLOOD PRESSURE: 129 MMHG | TEMPERATURE: 97.7 F | DIASTOLIC BLOOD PRESSURE: 75 MMHG | OXYGEN SATURATION: 93 %

## 2024-08-12 PROBLEM — R42 DIZZINESS: Status: ACTIVE | Noted: 2024-08-12

## 2024-08-12 LAB
ANION GAP SERPL CALC-SCNC: 3 MMOL/L (ref 5–15)
BUN SERPL-MCNC: 10 MG/DL (ref 6–20)
BUN/CREAT SERPL: 9 (ref 12–20)
CALCIUM SERPL-MCNC: 8.6 MG/DL (ref 8.5–10.1)
CHLORIDE SERPL-SCNC: 110 MMOL/L (ref 97–108)
CO2 SERPL-SCNC: 26 MMOL/L (ref 21–32)
CREAT SERPL-MCNC: 1.11 MG/DL (ref 0.7–1.3)
ECHO AO ASC DIAM: 3.5 CM
ECHO AO ASCENDING AORTA INDEX: 1.48 CM/M2
ECHO AO ROOT DIAM: 3.7 CM
ECHO AO ROOT INDEX: 1.57 CM/M2
ECHO AV AREA PEAK VELOCITY: 2.8 CM2
ECHO AV AREA/BSA PEAK VELOCITY: 1.2 CM2/M2
ECHO AV PEAK GRADIENT: 16 MMHG
ECHO AV PEAK VELOCITY: 2 M/S
ECHO AV VELOCITY RATIO: 0.65
ECHO BSA: 2.43 M2
ECHO BSA: 2.43 M2
ECHO LA DIAMETER INDEX: 1.91 CM/M2
ECHO LA DIAMETER: 4.5 CM
ECHO LA TO AORTIC ROOT RATIO: 1.22
ECHO LA VOL A-L A2C: 55 ML (ref 18–58)
ECHO LA VOL A-L A4C: 48 ML (ref 18–58)
ECHO LA VOL MOD A2C: 53 ML (ref 18–58)
ECHO LA VOL MOD A4C: 44 ML (ref 18–58)
ECHO LA VOLUME AREA LENGTH: 54 ML
ECHO LA VOLUME INDEX A-L A2C: 23 ML/M2 (ref 16–34)
ECHO LA VOLUME INDEX A-L A4C: 20 ML/M2 (ref 16–34)
ECHO LA VOLUME INDEX AREA LENGTH: 23 ML/M2 (ref 16–34)
ECHO LA VOLUME INDEX MOD A2C: 22 ML/M2 (ref 16–34)
ECHO LA VOLUME INDEX MOD A4C: 19 ML/M2 (ref 16–34)
ECHO LV E' LATERAL VELOCITY: 9 CM/S
ECHO LV E' SEPTAL VELOCITY: 6 CM/S
ECHO LV FRACTIONAL SHORTENING: 28 % (ref 28–44)
ECHO LV INTERNAL DIMENSION DIASTOLE INDEX: 1.69 CM/M2
ECHO LV INTERNAL DIMENSION DIASTOLIC: 4 CM (ref 4.2–5.9)
ECHO LV INTERNAL DIMENSION SYSTOLIC INDEX: 1.23 CM/M2
ECHO LV INTERNAL DIMENSION SYSTOLIC: 2.9 CM
ECHO LV IVSD: 1 CM (ref 0.6–1)
ECHO LV MASS 2D: 136.2 G (ref 88–224)
ECHO LV MASS INDEX 2D: 57.7 G/M2 (ref 49–115)
ECHO LV POSTERIOR WALL DIASTOLIC: 1.1 CM (ref 0.6–1)
ECHO LV RELATIVE WALL THICKNESS RATIO: 0.55
ECHO LVOT AREA: 4.2 CM2
ECHO LVOT DIAM: 2.3 CM
ECHO LVOT PEAK GRADIENT: 7 MMHG
ECHO LVOT PEAK VELOCITY: 1.3 M/S
ECHO MV A VELOCITY: 1.12 M/S
ECHO MV E DECELERATION TIME (DT): 154.8 MS
ECHO MV E VELOCITY: 0.63 M/S
ECHO MV E/A RATIO: 0.56
ECHO MV E/E' LATERAL: 7
ECHO MV E/E' RATIO (AVERAGED): 8.75
ECHO MV E/E' SEPTAL: 10.5
ECHO RV FREE WALL PEAK S': 18 CM/S
ECHO RV INTERNAL DIMENSION: 4.1 CM
ECHO RV TAPSE: 3 CM (ref 1.7–?)
ERYTHROCYTE [DISTWIDTH] IN BLOOD BY AUTOMATED COUNT: 13.3 % (ref 11.5–14.5)
EST. AVERAGE GLUCOSE BLD GHB EST-MCNC: 111 MG/DL
GLUCOSE SERPL-MCNC: 122 MG/DL (ref 65–100)
HBA1C MFR BLD: 5.5 % (ref 4–5.6)
HCT VFR BLD AUTO: 44.1 % (ref 36.6–50.3)
HGB BLD-MCNC: 15.3 G/DL (ref 12.1–17)
MAGNESIUM SERPL-MCNC: 2.1 MG/DL (ref 1.6–2.4)
MCH RBC QN AUTO: 31.5 PG (ref 26–34)
MCHC RBC AUTO-ENTMCNC: 34.7 G/DL (ref 30–36.5)
MCV RBC AUTO: 90.9 FL (ref 80–99)
NRBC # BLD: 0 K/UL (ref 0–0.01)
NRBC BLD-RTO: 0 PER 100 WBC
PHOSPHATE SERPL-MCNC: 2.8 MG/DL (ref 2.6–4.7)
PLATELET # BLD AUTO: 152 K/UL (ref 150–400)
PMV BLD AUTO: 9.7 FL (ref 8.9–12.9)
POTASSIUM SERPL-SCNC: 4 MMOL/L (ref 3.5–5.1)
RBC # BLD AUTO: 4.85 M/UL (ref 4.1–5.7)
SODIUM SERPL-SCNC: 139 MMOL/L (ref 136–145)
WBC # BLD AUTO: 7 K/UL (ref 4.1–11.1)

## 2024-08-12 PROCEDURE — 80048 BASIC METABOLIC PNL TOTAL CA: CPT

## 2024-08-12 PROCEDURE — 6370000000 HC RX 637 (ALT 250 FOR IP): Performed by: INTERNAL MEDICINE

## 2024-08-12 PROCEDURE — 84100 ASSAY OF PHOSPHORUS: CPT

## 2024-08-12 PROCEDURE — G0378 HOSPITAL OBSERVATION PER HR: HCPCS

## 2024-08-12 PROCEDURE — 2580000003 HC RX 258: Performed by: NURSE PRACTITIONER

## 2024-08-12 PROCEDURE — 36415 COLL VENOUS BLD VENIPUNCTURE: CPT

## 2024-08-12 PROCEDURE — 83735 ASSAY OF MAGNESIUM: CPT

## 2024-08-12 PROCEDURE — 94640 AIRWAY INHALATION TREATMENT: CPT

## 2024-08-12 PROCEDURE — 6370000000 HC RX 637 (ALT 250 FOR IP): Performed by: NURSE PRACTITIONER

## 2024-08-12 PROCEDURE — 93306 TTE W/DOPPLER COMPLETE: CPT

## 2024-08-12 PROCEDURE — 85027 COMPLETE CBC AUTOMATED: CPT

## 2024-08-12 PROCEDURE — 94760 N-INVAS EAR/PLS OXIMETRY 1: CPT

## 2024-08-12 PROCEDURE — 93270 REMOTE 30 DAY ECG REV/REPORT: CPT

## 2024-08-12 PROCEDURE — 6360000002 HC RX W HCPCS: Performed by: INTERNAL MEDICINE

## 2024-08-12 PROCEDURE — 96372 THER/PROPH/DIAG INJ SC/IM: CPT

## 2024-08-12 PROCEDURE — 6360000002 HC RX W HCPCS: Performed by: NURSE PRACTITIONER

## 2024-08-12 RX ORDER — ASPIRIN 81 MG/1
81 TABLET, CHEWABLE ORAL DAILY
Status: DISCONTINUED | OUTPATIENT
Start: 2024-08-13 | End: 2024-08-12 | Stop reason: HOSPADM

## 2024-08-12 RX ORDER — ASPIRIN 81 MG/1
81 TABLET, CHEWABLE ORAL DAILY
Qty: 30 TABLET | Refills: 0 | Status: SHIPPED | OUTPATIENT
Start: 2024-08-12

## 2024-08-12 RX ADMIN — SODIUM CHLORIDE, PRESERVATIVE FREE 10 ML: 5 INJECTION INTRAVENOUS at 09:03

## 2024-08-12 RX ADMIN — ENOXAPARIN SODIUM 30 MG: 100 INJECTION SUBCUTANEOUS at 09:03

## 2024-08-12 RX ADMIN — GABAPENTIN 900 MG: 300 CAPSULE ORAL at 09:02

## 2024-08-12 RX ADMIN — ATORVASTATIN CALCIUM 40 MG: 40 TABLET, FILM COATED ORAL at 09:02

## 2024-08-12 RX ADMIN — CLONAZEPAM 2 MG: 1 TABLET ORAL at 04:51

## 2024-08-12 RX ADMIN — FLUOXETINE HYDROCHLORIDE 40 MG: 20 CAPSULE ORAL at 09:06

## 2024-08-12 RX ADMIN — MONTELUKAST 10 MG: 10 TABLET, FILM COATED ORAL at 09:03

## 2024-08-12 RX ADMIN — IPRATROPIUM BROMIDE 0.5 MG: 0.5 SOLUTION RESPIRATORY (INHALATION) at 08:40

## 2024-08-12 RX ADMIN — DISULFIRAM 250 MG: 250 TABLET ORAL at 09:03

## 2024-08-12 RX ADMIN — PANTOPRAZOLE SODIUM 40 MG: 40 TABLET, DELAYED RELEASE ORAL at 09:03

## 2024-08-12 RX ADMIN — ARFORMOTEROL TARTRATE: 15 SOLUTION RESPIRATORY (INHALATION) at 08:40

## 2024-08-12 ASSESSMENT — PAIN SCALES - GENERAL: PAINLEVEL_OUTOF10: 0

## 2024-08-12 NOTE — CARE COORDINATION
Transition of Care Plan:    RUR: 10  Prior Level of Functioning: Independent  Disposition: Home  3:30 PM   Noted DC order.   Family here and ready to transport.   Pt was downgraded from Inpatient to OBS. CM Specialist provided BROWN letter today and CM provided CODE 44 letter to Pt prior to DC.     If SNF or IPR: Date FOC offered:   Date FOC received:   Accepting facility:   Date authorization started with reference number:   Date authorization received and expires:     Follow up appointments: CM Specialist made PCP appt   DME needed: n/a  Transportation at discharge: Wife  IM/IMM Medicare/ letter given: n/a OBS  Is patient a  and connected with [facility]? N/a   If yes, was [place] transfer form completed and [facility] notified?   Caregiver Contact: Wife  Discharge Caregiver contacted prior to discharge? Pt is alert and oriented and updating family.  Care Conference needed? N/a  Barriers to discharge: n/a    Otilia Woodard CM  [phone]         08/12/24 1530   Services At/After Discharge   Transition of Care Consult (CM Consult) N/A   Services At/After Discharge None    Resource Information Provided? No   Mode of Transport at Discharge Other (see comment)  (Family transported around 3:30 PM)   Confirm Follow Up Transport Family   Condition of Participation: Discharge Planning   The Plan for Transition of Care is related to the following treatment goals: Home   The Patient and/or Patient Representative was provided with a Choice of Provider? Patient   Freedom of Choice list was provided with basic dialogue that supports the patient's individualized plan of care/goals, treatment preferences, and shares the quality data associated with the providers?  Yes

## 2024-08-12 NOTE — DISCHARGE INSTRUCTIONS
Driving restrictions as below:  It is the policy of the Department of Motor Vehicles, based on guidance and recommendations from the Medical Advisory Board, that if a  suffers a Transient Ischemic Attack (TIA), the ’s privilege to operate a motor vehicle will be suspended for three months. The three-month suspension may be shortened for drivers who have suffered a TIA, provided that treatment has been provided mitigating the risk of reoccurrence and there is no impact on a ’s ability to operate a motor vehicle safely. These cases may be referred to the [facility] Medical Advisory Board for their guidance and recommendations.

## 2024-08-12 NOTE — CASE COMMUNICATION
COMMUNICATION NOTE - Neurology Service    Name:  Alexandr Bush     MRN: [id number]         Communication Note:   CT head negative for any acute intracranial abnormality.  CTA head and neck negative for Large vessel occlusion  MRI brain - No acute intracranial abnormality.   Obtain outpatient cardiac monitoring to eval for cardioembolic source of stroke  Follow up with Neurology in the out patient clinic.  I explained in detail about the current condition.   Rest of the management per primary team.  Neurology will sign off.   Please do not hesitate to call with questions or concerns.  Please let us know if we can provide any additional information.

## 2024-08-12 NOTE — DISCHARGE SUMMARY
upon chest pain, wait 5 minutes and may administer a total of 3 doses in 15 minutes. Do not crush or break.     ondansetron 4 MG disintegrating tablet  Commonly known as: ZOFRAN-ODT  Place 1 tablet under the tongue 3 times daily as needed for Nausea or Vomiting     pantoprazole 20 MG tablet  Commonly known as: PROTONIX     TRELEGY ELLIPTA IN     zolpidem 10 MG tablet  Commonly known as: AMBIEN            STOP taking these medications      acidophilus Caps capsule     carvedilol 6.25 MG tablet  Commonly known as: COREG     lisinopril-hydroCHLOROthiazide 20-12.5 MG per tablet  Commonly known as: PRINZIDE;ZESTORETIC     meclizine 25 MG tablet  Commonly known as: ANTIVERT     naproxen 500 MG tablet  Commonly known as: Naprosyn               Where to Get Your Medications        These medications were sent to [facility]/pharmacy #[id number] - [place], [place] - [address] - P [phone] - F [phone]  [address] [facility] [address]      Phone: [phone]   aspirin 81 MG chewable tablet             DISPOSITION:    Home with Family:  x     Home with HH/PT/OT/RN:    SNF/LTC:    SHAZIA:    OTHER:            Code status: Full code  Recommended diet: cardiac diet  Recommended activity: activity as tolerated  Wound care: None      Follow up with:   PCP : Christy Taylor APRN - NP Riedt, Stacy [place], [place]  [address] [facility] [address] [phone]    Go on 8/27/2024  at 1:15pm for your PCP hospital follow up.    "[employer]"[address]  [address] [place] [address] [phone]  Follow up  As needed - [facility] is a mobile urgent care provider that comes to your home. You may call them if you would like to set up an appt to be seen for a follow up while waiting to be seen by your PCP.    Christy Taylor APRN - NP  [address] [facility] [address] [phone]    Follow up in 1 week(s)      Ibrahima Phipps MD  [address]

## 2024-08-12 NOTE — PROGRESS NOTES
Discharge paperwork reviewed with patient. Patient verbalized understanding. Patient confirmed pharmacy and confirmed will make follow up appointments. Patient going home with son. All LDAs removed. Patient's home medication sent with wife.  
End of Shift Note    Bedside shift change report given to  Fran JOSE  (oncoming nurse) by Whit Washington RN .        Shift worked:  7a-7p   Shift summary and any significant changes:     New admission from ED. No complaints of pain. NIHSS 0.       Concerns for physician to address:  None   Zone phone for oncoming shift:   [phone]     Patient Information  Alexandr Bush  59 y.o.  8/10/2024 11:00 AM by Danii High MD. Alexandr Bush was admitted from [facility]    Problem List  Patient Active Problem List    Diagnosis Date Noted    CVA (cerebrovascular accident due to intracerebral hemorrhage) ([facility]) 08/10/2024    Left-sided weakness 08/10/2024    Chest pain 10/31/2023    CVA (cerebral vascular accident) ([facility]) 08/03/2022    Alcohol abuse, in remission 05/12/2022    Ecchymosis 05/12/2022    Other male erectile dysfunction 05/12/2022    History of chest pain 05/12/2022    Small vessel disease, cerebrovascular 05/12/2022    Transient ischemic attack involving right internal carotid artery 05/05/2022    Status post laparoscopic cholecystectomy     Abdominal pain 08/24/2011     Past Medical History:   Diagnosis Date    At risk for sleep apnea     Cholelithiasis 8/22/2011    COPD (chronic obstructive pulmonary disease) ([facility])     High cholesterol     Hypertension     Left sided numbness 5/5/2022    Other ill-defined conditions(799.89)     hypercholesterolemia    Paresthesia 5/4/2022    Psychiatric disorder     anxiety    Seasonal allergies     Status post laparoscopic cholecystectomy 8/25/11       Core Measures:  CVA: yes  CHF: no  PNA: no    Activity:     Number times ambulated in hallways past shift: 0  Number of times OOB to chair past shift: 0    Cardiac:   Cardiac Monitoring: yes, SR    Access:   Current line(s): PIV    Respiratory:   O2 Device: None (Room air)    GI:     Current diet:  ADULT DIET; Regular  Tolerating current diet: Yes    Pain Management:   Patient states pain is manageable on current regimen: yes    Skin:  Cristian Scale 
End of Shift Note    Bedside shift change report given to  Fran RN  (oncoming nurse) by Mary Lou Dior RN .        Shift worked: Days   Shift summary and any significant changes:    PT/OT came to bedside   Echo pending   No acute changes      Concerns for physician to address:  None   Zone phone for oncoming shift:   [phone]     Patient Information  Alexandr Bush  59 y.o.  8/10/2024 11:00 AM by Danii High MD. Alexandr Bush was admitted from [facility]    Problem List  Patient Active Problem List    Diagnosis Date Noted    CVA (cerebrovascular accident due to intracerebral hemorrhage) (H[facility]) 08/10/2024    Left-sided weakness 08/10/2024    Chest pain 10/31/2023    CVA (cerebral vascular accident) ([facility]) 08/03/2022    Alcohol abuse, in remission 05/12/2022    Ecchymosis 05/12/2022    Other male erectile dysfunction 05/12/2022    History of chest pain 05/12/2022    Small vessel disease, cerebrovascular 05/12/2022    Transient ischemic attack involving right internal carotid artery 05/05/2022    Status post laparoscopic cholecystectomy     Abdominal pain 08/24/2011     Past Medical History:   Diagnosis Date    At risk for sleep apnea     Cholelithiasis 8/22/2011    COPD (chronic obstructive pulmonary disease) ([facility])     High cholesterol     Hypertension     Left sided numbness 5/5/2022    Other ill-defined conditions(799.89)     hypercholesterolemia    Paresthesia 5/4/2022    Psychiatric disorder     anxiety    Seasonal allergies     Status post laparoscopic cholecystectomy 8/25/11       Core Measures:  CVA: yes  CHF: no  PNA: no    Activity:  Level of Assistance: Independent  Number times ambulated in hallways past shift: 0  Number of times OOB to chair past shift: 0    Cardiac:   Cardiac Monitoring: yes, SR    Access:   Current line(s): PIV    Respiratory:   O2 Device: None (Room air)    GI:  Last BM (including prior to admit): 08/11/24  Current diet:  ADULT DIET; Regular  Tolerating current diet: Yes    Pain Management: 
End of Shift Note    Bedside shift change report given to  Henry RN  (oncoming nurse) by Fran Cohen RN .        Shift worked: Night   Shift summary and any significant changes:      Echo pending   No acute changes      Concerns for physician to address:  None   Zone phone for oncoming shift:   [phone]     Patient Information  Alexandr Bush  59 y.o.  8/10/2024 11:00 AM by Danii High MD. Alexandr Bush was admitted from [facility]    Problem List  Patient Active Problem List    Diagnosis Date Noted    CVA (cerebrovascular accident due to intracerebral hemorrhage) ([facility]) 08/10/2024    Left-sided weakness 08/10/2024    Chest pain 10/31/2023    CVA (cerebral vascular accident) ([facility]) 08/03/2022    Alcohol abuse, in remission 05/12/2022    Ecchymosis 05/12/2022    Other male erectile dysfunction 05/12/2022    History of chest pain 05/12/2022    Small vessel disease, cerebrovascular 05/12/2022    Transient ischemic attack involving right internal carotid artery 05/05/2022    Status post laparoscopic cholecystectomy     Abdominal pain 08/24/2011     Past Medical History:   Diagnosis Date    At risk for sleep apnea     Cholelithiasis 8/22/2011    COPD (chronic obstructive pulmonary disease) ([facility])     High cholesterol     Hypertension     Left sided numbness 5/5/2022    Other ill-defined conditions(799.89)     hypercholesterolemia    Paresthesia 5/4/2022    Psychiatric disorder     anxiety    Seasonal allergies     Status post laparoscopic cholecystectomy 8/25/11       Core Measures:  CVA: yes  CHF: no  PNA: no    Activity:  Level of Assistance: Independent  Number times ambulated in hallways past shift: 0  Number of times OOB to chair past shift: 0    Cardiac:   Cardiac Monitoring: yes, SR    Access:   Current line(s): PIV    Respiratory:   O2 Device: None (Room air)    GI:  Last BM (including prior to admit): 08/11/24  Current diet:  ADULT DIET; Regular  Tolerating current diet: Yes    Pain Management:   Patient states pain is 
Hospital follow-up PCP transitional care appointment has been scheduled with MARYANN Taylor on 8/27/24 8923. PCP office is closed next week. This is the first available appt due to limited provider availability. PCP office does not offer alternate provider option for hospital follow up. [facility] placed [facility] information AVS for patient resource. Pending patient discharge. Magdalene Mcdermott, Care Management Assistant  
OCCUPATIONAL THERAPY EVALUATION/DISCHARGE  Patient: Alexandr Bush (59 y.o. male)  Date: 8/11/2024  Primary Diagnosis: Dizziness [R42]  CVA (cerebrovascular accident due to intracerebral hemorrhage) (HCC) [I61.9]  Left-sided weakness [R53.1]  Right facial numbness [R20.0]  Cerebrovascular accident (CVA), unspecified mechanism (HCC) [I63.9]         Precautions: Bed Alarm                  ASSESSMENT :  Based on the objective data below, the patient was supine in bed, independent with bed mobility, able to stand with Mod I, and walked in room with out AD, and no Lob.  Pt stated that he has hip pain and will be seeing a ortho dr soon and plans to get a Cortizone shot in hip that will help.  Pt is independent with ADLs and ILS PTA and is at his baseline at this time.  No OT needs.     Functional Outcome Measure:  The patient scored 80/100 on the Barthel outcome measure which is indicative of SBA to supervision in this setting for ADLs.      Further skilled acute occupational therapy is not indicated at this time.     PLAN :  Recommend with staff: OOB for meals and walk to bathroom and if staff has time walk in halls    Recommendation for discharge: (in order for the patient to meet his/her long term goals): No skilled occupational therapy        IF patient discharges home will need the following DME: none     SUBJECTIVE:   Patient stated, “I have some hip pain so I am limping now. .”    OBJECTIVE DATA SUMMARY:     Past Medical History:   Diagnosis Date    At risk for sleep apnea     Cholelithiasis 8/22/2011    COPD (chronic obstructive pulmonary disease) ([facility])     High cholesterol     Hypertension     Left sided numbness 5/5/2022    Other ill-defined conditions(799.89)     hypercholesterolemia    Paresthesia 5/4/2022    Psychiatric disorder     anxiety    Seasonal allergies     Status post laparoscopic cholecystectomy 8/25/11     Past Surgical History:   Procedure Laterality Date    CHOLECYSTECTOMY      COLONOSCOPY N/A 
Spiritual Health Assessment/Progress Note  [facility]    Initial Encounter,  ,  ,      Name: Alexandr Bush MRN: [id number]    Age: 59 y.o.     Sex: male   Language: English   Nondenominational: Other   CVA (cerebrovascular accident due to intracerebral hemorrhage) (HCC)     Date: 8/11/2024            Total Time Calculated: 54 min              Spiritual Assessment began in [facility] NEUROSCIENCE TELEMETRY        Referral/Consult From: Rounding   Encounter Overview/Reason: Initial Encounter  Service Provided For: Patient    Aixa, Belief, Meaning:   Patient identifies as spiritual, is connected with a aixa tradition or spiritual practice, and has beliefs or practices that help with coping during difficult times  Family/Friends No family/friends present      Importance and Influence:  Patient has spiritual/personal beliefs that influence decisions regarding their health  Family/Friends no family/friends present    Community:  Patient is connected with a spiritual community and feels well-supported. Support system includes: Spouse/Partner, Children, Aixa Community, Friends, and Extended family  Family/Friends     Assessment and Plan of Care:     Patient Interventions include: Facilitated expression of thoughts and feelings, Explored spiritual coping/struggle/distress and theological reflection, Affirmed coping skills/support systems, and Engaged in life review and/or legacy  Family/Friends Interventions include:     Patient Plan of Care: No spiritual needs identified for follow-up and Spiritual Care available upon further referral  Family/Friends Plan of Care:         Electronically signed by Chaplain SARAH [facility] on 8/11/2024 at 4:53 PM    
CODE NEURO HEAD WO CONTRAST    CLINICAL HISTORY: Dizziness  INDICATION: Dizziness, confusion  COMPARISON: 8/4/2022.  CT dose reduction was achieved through use of a standardized protocol tailored  for this examination and automatic exposure control for dose modulation.  TECHNIQUE: Serial axial images with a collimation of 5 mm were obtained from the  skull base through the vertex  FINDINGS:  The sulci and ventricles are within normal limits for patient age. There is no  evidence of an acute infarction, hemorrhage, or mass-effect. There is no  evidence of midline shift or hydrocephalus. Posterior fossa structures are  unremarkable. No extra-axial collections are seen.  Mastoid air cells are well pneumatized and clear.  Atypical soft tissue of the left cavernous sinus, not visualized on prior brain  MRIs.    Impression  No acute intracranial process.  There is no intracranial mass or hemorrhage.    Asymmetric soft tissue density of the left cavernous sinus.    Nonemergent MRI of the brain with skull base protocol recommended.    Electronically signed by MAGY GASPAR   MRI Brain WO Contrast: Results for orders placed during the hospital encounter of 08/10/24    MRI BRAIN WO CONTRAST    Narrative  EXAM: MRI BRAIN WO CONTRAST    INDICATION: dizziness    COMPARISON: CT earlier same day and MRI 8/4/2022..    CONTRAST: None.    TECHNIQUE:  Multiplanar multisequence acquisition without contrast of the brain.    FINDINGS:  The ventricles are normal in size and position. There are few scattered foci of  increased T2 signal intensity in the corona radiata and centrum semiovale. There  is no acute infarct, hemorrhage, extra-axial fluid collection, or mass effect.  There is no cerebellar tonsillar herniation. Expected arterial flow-voids are  present.    The paranasal sinuses, mastoid air cells, and middle ears are clear. The orbital  contents are within normal limits. No significant osseous or scalp lesions 
states pain is manageable on current regimen: yes    Skin:  Cristian Scale Score: 21  Interventions: N/A  Pressure injury: no    Patient Safety:  Fall Score: Castellano Total Score: 25  Interventions: bed alarm  Self-release roll belt: No  Dexterity to release roll belt: yes   (must document dexterity  here by stating Yes or No here, otherwise this is a restraint and must follow restraint documentation policy.)    DVT prophylaxis:  DVT prophylaxis: meds    Active Consults:  IP CONSULT TO TELE-NEUROLOGY  IP CONSULT TO NEUROLOGY  IP CONSULT TO SPIRITUAL SERVICES    Length of Stay:  Expected LOS: 3  Actual LOS: 1    Fran Cohen RN                              
  Good insight. Not anxious nor agitated  Skin:  No rashes.  No jaundice    Reviewed most current lab test results and cultures  YES  Reviewed most current radiology test results   YES  Review and summation of old records today    NO  Reviewed patient's current orders and MAR    YES  PMH/SH reviewed - no change compared to H&P    Procedures: see electronic medical records for all procedures/Xrays and details which were not copied into this note but were reviewed prior to creation of Plan.      LABS:  I reviewed today's most current labs and imaging studies.  Pertinent labs include:  Recent Labs     08/10/24  1106 08/11/24  0343   WBC 6.8 7.1   HGB 15.6 15.4   HCT 45.5 44.1    147*     Recent Labs     08/10/24  1106 08/11/24  0343    138   K 4.4 4.4    110*   CO2 27 24   GLUCOSE 116* 101*   BUN 11 11   CREATININE 1.11 1.02   CALCIUM 9.2 8.9   MG  --  2.1   BILITOT 0.6  --    AST 14*  --    ALT 37  --    INR 1.0  --        Signed: Steffi Day MD          
Independent  Active : Yes  Mode of Transportation: Car  Critical Behavior:  Orientation  Overall Orientation Status: Within Normal Limits  Orientation Level: Oriented X4  Cognition  Overall Cognitive Status: Exceptions  Arousal/Alertness: Appears intact  Following Commands: Appears intact  Attention Span: Appears intact  Memory: Impaired;Decreased short term memory  Safety Judgement: Appears intact  Problem Solving: Appears intact  Insights: Impaired  Initiation: Appears intact  Sequencing: Appears intact    Hearing:        Vision/Perceptual:    Vision - Basic Assessment  Prior Vision: Wears glasses all the time             Strength:    Strength: Generally decreased, functional    Tone & Sensation:   Tone: Normal  Sensation: Impaired (L hand N/T)    Coordination:  Coordination: Within functional limits    Range Of Motion:  AROM: Generally decreased, functional       Functional Mobility:  Bed Mobility:     Bed Mobility Training  Bed Mobility Training: Yes  Overall Level of Assistance: Modified independent  Rolling: Modified independent  Supine to Sit: Modified independent  Scooting: Modified independent  Transfers:     Transfer Training  Transfer Training: Yes  Overall Level of Assistance: Modified independent  Sit to Stand: Modified independent  Stand to Sit: Modified independent  Balance:               Balance  Sitting: Intact  Standing: Intact  Standing - Static: Good  Standing - Dynamic: Good  Ambulation/Gait Training:                       Gait  Gait Training: Yes  Left Side Weight Bearing: Full  Right Side Weight Bearing: Full  Overall Level of Assistance: Supervision  Distance (ft): 25 Feet (x 2)  Assistive Device: Gait belt  Interventions: Safety awareness training  Base of Support: Widened  Speed/Kiera: Slow  Step Length: Right shortened;Left shortened  Gait Abnormalities: Antalgic;Decreased step clearance

## 2024-08-12 NOTE — CONSULTS
CONSULT - Neurology      Name:  Alexandr Bush       MRN: [id number]  Location: 136/01    Date: 8/11/2024  Time:  9:37 AM        Chief Complaint:   Chief Complaint   Patient presents with    Dizziness     Pt ambulatory into triage w/ cc sudden onset \"clumsiness\" and confusion, dizziness, imbalance and tingling to R side of face x 1000. Pt has hx TIA        HPI:  It is a great pleasure to see Alexandr Bush, a 59 y.o. male today in the hospital . Briefly these are the events happened as per the chart and taken from the chart. The patient was doing fine and the symptoms started with left sided weakness and  dizziness . The symptoms fluctuated in the beginning. There were no aggravating and relieving factors. The patient was brought to the emergency room for further evaluation.     PAST MEDICAL HISTORY:  Past Medical History:   Diagnosis Date    At risk for sleep apnea     Cholelithiasis 8/22/2011    COPD (chronic obstructive pulmonary disease) (HCC)     High cholesterol     Hypertension     Left sided numbness 5/5/2022    Other ill-defined conditions(799.89)     hypercholesterolemia    Paresthesia 5/4/2022    Psychiatric disorder     anxiety    Seasonal allergies     Status post laparoscopic cholecystectomy 8/25/11     PAST SURGICAL HISTORY:    Past Surgical History:   Procedure Laterality Date    CHOLECYSTECTOMY      COLONOSCOPY N/A 3/25/2024    COLONOSCOPY, EGD performed by Rony Montes MD at [facility] ENDOSCOPY    LAP,CHOLECYSTECTOMY  8/25/11    ORTHOPEDIC SURGERY      back srgery, arm surgery    UPPER GASTROINTESTINAL ENDOSCOPY N/A 3/25/2024    ESOPHAGOGASTRODUODENOSCOPY performed by Rony Montes MD at [facility] ENDOSCOPY     FAMILY HISTORY:    Family History   Problem Relation Age of Onset    Headache Mother     Heart Disease Mother     COPD Father     Headache Sister     Headache Brother     Heart Disease Maternal Grandfather     Heart Disease Paternal Grandmother      SOCIAL HISTORY:   Social History 
kg (263 lb 10.7 oz)   11/08/23 120.7 kg (266 lb)   10/31/23 122.9 kg (270 lb 15.1 oz)          Studies:     Labs:  Recent Labs     08/10/24  1106 08/11/24  0343 08/12/24  0127   BUN 11 11 10   CREATININE 1.11 1.02 1.11   CALCIUM 9.2 8.9 8.6   GLUCOSE 116* 101* 122*   INR 1.0  --   --      Recent Labs     08/11/24  0343 08/12/24  0127   WBC 7.1 7.0   RBC 4.90 4.85   HGB 15.4 15.3   HCT 44.1 44.1   MCV 90.0 90.9   MCH 31.4 31.5   MCHC 34.9 34.7   * 152   MPV 9.7 9.7     INR   Date/Time Value Ref Range Status   08/10/2024 11:06 AM 1.0 0.9 - 1.1   Final     Comment:     A single therapeutic range for Vit K antagonists may not be optimal for all indications - see June, 2008 issue of Chest, American College of [employer]icians Evidence-Based Clinical Practice Guidelines, 8th Edition.   10/31/2023 09:58 AM 1.0 0.9 - 1.1   Final     Comment:     A single therapeutic range for Vit K antagonists may not be optimal for all indications - see June, 2008 issue of Chest, American College of Chest [employer]icians Evidence-Based Clinical Practice Guidelines, 8th Edition.   08/03/2022 04:36 PM 1.1 0.9 - 1.1   Final     Comment:     A single therapeutic range for Vit K antagonists may not be optimal for all indications - see June, 2008 issue of Chest, American College of Chest [employer]icians Evidence-Based Clinical Practice Guidelines, 8th Edition.       Telemetry:   Sinus rhythm.     EKG:     Reviewed normal sinus rhythm      Echocardiogram:  Pending             Current Medications:  Current Facility-Administered Medications   Medication Dose Route Frequency Provider Last Rate Last Admin    [START ON 8/13/2024] aspirin chewable tablet 81 mg  81 mg Oral Daily Sarina Mohan, APRN - NP        lidocaine 4 % external patch 1 patch  1 patch TransDERmal Daily Steffi Day MD   1 patch at 08/12/24 0902    acetaminophen (TYLENOL) tablet 650 mg  650 mg Oral Q4H PRN Andre Londono MD        ARIPiprazole (ABILIFY) tablet 10 mg  10

## 2024-10-11 LAB — ECHO BSA: 2.43 M2

## 2024-10-14 LAB — ECHO BSA: 2.43 M2

## 2024-12-27 ENCOUNTER — APPOINTMENT (OUTPATIENT)
Facility: HOSPITAL | Age: 59
End: 2024-12-27
Payer: MEDICARE

## 2024-12-27 ENCOUNTER — HOSPITAL ENCOUNTER (OUTPATIENT)
Facility: HOSPITAL | Age: 59
Setting detail: OBSERVATION
Discharge: HOME | End: 2024-12-29
Attending: STUDENT IN AN ORGANIZED HEALTH CARE EDUCATION/TRAINING PROGRAM | Admitting: STUDENT IN AN ORGANIZED HEALTH CARE EDUCATION/TRAINING PROGRAM
Payer: MEDICARE

## 2024-12-27 DIAGNOSIS — R07.9 CHEST PAIN, UNSPECIFIED TYPE: Primary | ICD-10-CM

## 2024-12-27 LAB
ALBUMIN SERPL-MCNC: 3.5 G/DL (ref 3.5–5)
ALBUMIN/GLOB SERPL: 1.1 (ref 1.1–2.2)
ALP SERPL-CCNC: 114 U/L (ref 45–117)
ALT SERPL-CCNC: 29 U/L (ref 12–78)
ANION GAP SERPL CALC-SCNC: 4 MMOL/L (ref 2–12)
AST SERPL-CCNC: 18 U/L (ref 15–37)
BASOPHILS # BLD: 0 K/UL (ref 0–0.1)
BASOPHILS NFR BLD: 1 % (ref 0–1)
BILIRUB SERPL-MCNC: 0.4 MG/DL (ref 0.2–1)
BUN SERPL-MCNC: 10 MG/DL (ref 6–20)
BUN/CREAT SERPL: 11 (ref 12–20)
CALCIUM SERPL-MCNC: 9 MG/DL (ref 8.5–10.1)
CHLORIDE SERPL-SCNC: 109 MMOL/L (ref 97–108)
CO2 SERPL-SCNC: 24 MMOL/L (ref 21–32)
CREAT SERPL-MCNC: 0.95 MG/DL (ref 0.7–1.3)
DIFFERENTIAL METHOD BLD: ABNORMAL
EKG ATRIAL RATE: 71 BPM
EKG DIAGNOSIS: NORMAL
EKG P AXIS: 65 DEGREES
EKG P-R INTERVAL: 210 MS
EKG Q-T INTERVAL: 394 MS
EKG QRS DURATION: 94 MS
EKG QTC CALCULATION (BAZETT): 428 MS
EKG R AXIS: 62 DEGREES
EKG T AXIS: 67 DEGREES
EKG VENTRICULAR RATE: 71 BPM
EOSINOPHIL # BLD: 0.3 K/UL (ref 0–0.4)
EOSINOPHIL NFR BLD: 4 % (ref 0–7)
ERYTHROCYTE [DISTWIDTH] IN BLOOD BY AUTOMATED COUNT: 13.1 % (ref 11.5–14.5)
GLOBULIN SER CALC-MCNC: 3.1 G/DL (ref 2–4)
GLUCOSE SERPL-MCNC: 112 MG/DL (ref 65–100)
HCT VFR BLD AUTO: 45.1 % (ref 36.6–50.3)
HGB BLD-MCNC: 15.5 G/DL (ref 12.1–17)
IMM GRANULOCYTES # BLD AUTO: 0.1 K/UL (ref 0–0.04)
IMM GRANULOCYTES NFR BLD AUTO: 1 % (ref 0–0.5)
LYMPHOCYTES # BLD: 2.3 K/UL (ref 0.8–3.5)
LYMPHOCYTES NFR BLD: 37 % (ref 12–49)
MCH RBC QN AUTO: 30.9 PG (ref 26–34)
MCHC RBC AUTO-ENTMCNC: 34.4 G/DL (ref 30–36.5)
MCV RBC AUTO: 90 FL (ref 80–99)
MONOCYTES # BLD: 0.6 K/UL (ref 0–1)
MONOCYTES NFR BLD: 9 % (ref 5–13)
NEUTS SEG # BLD: 3 K/UL (ref 1.8–8)
NEUTS SEG NFR BLD: 48 % (ref 32–75)
NRBC # BLD: 0 K/UL (ref 0–0.01)
NRBC BLD-RTO: 0 PER 100 WBC
PLATELET # BLD AUTO: 150 K/UL (ref 150–400)
PMV BLD AUTO: 9.7 FL (ref 8.9–12.9)
POTASSIUM SERPL-SCNC: 3.9 MMOL/L (ref 3.5–5.1)
PROT SERPL-MCNC: 6.6 G/DL (ref 6.4–8.2)
RBC # BLD AUTO: 5.01 M/UL (ref 4.1–5.7)
SODIUM SERPL-SCNC: 137 MMOL/L (ref 136–145)
TROPONIN I SERPL HS-MCNC: 5 NG/L (ref 0–76)
TROPONIN I SERPL HS-MCNC: <4 NG/L (ref 0–76)
TROPONIN I SERPL HS-MCNC: <4 NG/L (ref 0–76)
WBC # BLD AUTO: 6.3 K/UL (ref 4.1–11.1)

## 2024-12-27 PROCEDURE — 85025 COMPLETE CBC W/AUTO DIFF WBC: CPT

## 2024-12-27 PROCEDURE — 6370000000 HC RX 637 (ALT 250 FOR IP): Performed by: STUDENT IN AN ORGANIZED HEALTH CARE EDUCATION/TRAINING PROGRAM

## 2024-12-27 PROCEDURE — 99285 EMERGENCY DEPT VISIT HI MDM: CPT

## 2024-12-27 PROCEDURE — 96374 THER/PROPH/DIAG INJ IV PUSH: CPT

## 2024-12-27 PROCEDURE — 36415 COLL VENOUS BLD VENIPUNCTURE: CPT

## 2024-12-27 PROCEDURE — 71045 X-RAY EXAM CHEST 1 VIEW: CPT

## 2024-12-27 PROCEDURE — 6360000002 HC RX W HCPCS: Performed by: STUDENT IN AN ORGANIZED HEALTH CARE EDUCATION/TRAINING PROGRAM

## 2024-12-27 PROCEDURE — 84484 ASSAY OF TROPONIN QUANT: CPT

## 2024-12-27 PROCEDURE — 93005 ELECTROCARDIOGRAM TRACING: CPT | Performed by: STUDENT IN AN ORGANIZED HEALTH CARE EDUCATION/TRAINING PROGRAM

## 2024-12-27 PROCEDURE — 80053 COMPREHEN METABOLIC PANEL: CPT

## 2024-12-27 PROCEDURE — G0378 HOSPITAL OBSERVATION PER HR: HCPCS

## 2024-12-27 RX ORDER — ONDANSETRON 2 MG/ML
4 INJECTION INTRAMUSCULAR; INTRAVENOUS EVERY 6 HOURS PRN
Status: DISCONTINUED | OUTPATIENT
Start: 2024-12-27 | End: 2024-12-27

## 2024-12-27 RX ORDER — PANTOPRAZOLE SODIUM 40 MG/1
40 TABLET, DELAYED RELEASE ORAL 2 TIMES DAILY
Status: DISCONTINUED | OUTPATIENT
Start: 2024-12-27 | End: 2024-12-29 | Stop reason: HOSPADM

## 2024-12-27 RX ORDER — SODIUM CHLORIDE 9 MG/ML
INJECTION, SOLUTION INTRAVENOUS PRN
Status: DISCONTINUED | OUTPATIENT
Start: 2024-12-27 | End: 2024-12-29 | Stop reason: HOSPADM

## 2024-12-27 RX ORDER — MONTELUKAST SODIUM 10 MG/1
10 TABLET ORAL DAILY
Status: DISCONTINUED | OUTPATIENT
Start: 2024-12-28 | End: 2024-12-29 | Stop reason: HOSPADM

## 2024-12-27 RX ORDER — ASPIRIN 81 MG/1
81 TABLET, CHEWABLE ORAL DAILY
Status: DISCONTINUED | OUTPATIENT
Start: 2024-12-28 | End: 2024-12-29 | Stop reason: HOSPADM

## 2024-12-27 RX ORDER — ACETAMINOPHEN 650 MG/1
650 SUPPOSITORY RECTAL EVERY 6 HOURS PRN
Status: DISCONTINUED | OUTPATIENT
Start: 2024-12-27 | End: 2024-12-29 | Stop reason: HOSPADM

## 2024-12-27 RX ORDER — ONDANSETRON 2 MG/ML
4 INJECTION INTRAMUSCULAR; INTRAVENOUS EVERY 6 HOURS PRN
Status: DISCONTINUED | OUTPATIENT
Start: 2024-12-27 | End: 2024-12-29 | Stop reason: HOSPADM

## 2024-12-27 RX ORDER — ENOXAPARIN SODIUM 100 MG/ML
30 INJECTION SUBCUTANEOUS 2 TIMES DAILY
Status: DISCONTINUED | OUTPATIENT
Start: 2024-12-27 | End: 2024-12-29 | Stop reason: HOSPADM

## 2024-12-27 RX ORDER — GABAPENTIN 300 MG/1
900 CAPSULE ORAL 2 TIMES DAILY
Status: DISCONTINUED | OUTPATIENT
Start: 2024-12-27 | End: 2024-12-29 | Stop reason: HOSPADM

## 2024-12-27 RX ORDER — ARIPIPRAZOLE 5 MG/1
10 TABLET ORAL EVERY EVENING
Status: DISCONTINUED | OUTPATIENT
Start: 2024-12-27 | End: 2024-12-29 | Stop reason: HOSPADM

## 2024-12-27 RX ORDER — AMLODIPINE BESYLATE 5 MG/1
5 TABLET ORAL DAILY
Status: DISCONTINUED | OUTPATIENT
Start: 2024-12-28 | End: 2024-12-29 | Stop reason: HOSPADM

## 2024-12-27 RX ORDER — ATORVASTATIN CALCIUM 40 MG/1
40 TABLET, FILM COATED ORAL NIGHTLY
Status: DISCONTINUED | OUTPATIENT
Start: 2024-12-27 | End: 2024-12-29 | Stop reason: HOSPADM

## 2024-12-27 RX ORDER — DISULFIRAM 250 MG/1
250 TABLET ORAL DAILY
Status: DISCONTINUED | OUTPATIENT
Start: 2024-12-28 | End: 2024-12-29 | Stop reason: HOSPADM

## 2024-12-27 RX ORDER — CLONAZEPAM 1 MG/1
2 TABLET ORAL 2 TIMES DAILY PRN
Status: DISCONTINUED | OUTPATIENT
Start: 2024-12-27 | End: 2024-12-29 | Stop reason: HOSPADM

## 2024-12-27 RX ORDER — SODIUM CHLORIDE 0.9 % (FLUSH) 0.9 %
5-40 SYRINGE (ML) INJECTION PRN
Status: DISCONTINUED | OUTPATIENT
Start: 2024-12-27 | End: 2024-12-29 | Stop reason: HOSPADM

## 2024-12-27 RX ORDER — ASPIRIN 81 MG/1
324 TABLET, CHEWABLE ORAL ONCE
Status: COMPLETED | OUTPATIENT
Start: 2024-12-27 | End: 2024-12-27

## 2024-12-27 RX ORDER — ACETAMINOPHEN 325 MG/1
650 TABLET ORAL EVERY 6 HOURS PRN
Status: DISCONTINUED | OUTPATIENT
Start: 2024-12-27 | End: 2024-12-29 | Stop reason: HOSPADM

## 2024-12-27 RX ORDER — ZOLPIDEM TARTRATE 5 MG/1
10 TABLET ORAL NIGHTLY PRN
Status: DISCONTINUED | OUTPATIENT
Start: 2024-12-27 | End: 2024-12-29 | Stop reason: HOSPADM

## 2024-12-27 RX ORDER — ACETAMINOPHEN 325 MG/1
650 TABLET ORAL EVERY 6 HOURS PRN
Status: DISCONTINUED | OUTPATIENT
Start: 2024-12-27 | End: 2024-12-27

## 2024-12-27 RX ORDER — SODIUM CHLORIDE 0.9 % (FLUSH) 0.9 %
5-40 SYRINGE (ML) INJECTION EVERY 12 HOURS SCHEDULED
Status: DISCONTINUED | OUTPATIENT
Start: 2024-12-27 | End: 2024-12-29 | Stop reason: HOSPADM

## 2024-12-27 RX ORDER — ALBUTEROL SULFATE 90 UG/1
4 INHALANT RESPIRATORY (INHALATION) EVERY 4 HOURS PRN
Status: DISCONTINUED | OUTPATIENT
Start: 2024-12-27 | End: 2024-12-29 | Stop reason: HOSPADM

## 2024-12-27 RX ADMIN — ASPIRIN 324 MG: 81 TABLET, CHEWABLE ORAL at 15:01

## 2024-12-27 RX ADMIN — ONDANSETRON 4 MG: 2 INJECTION INTRAMUSCULAR; INTRAVENOUS at 15:01

## 2024-12-27 RX ADMIN — NITROGLYCERIN 1 INCH: 20 OINTMENT TOPICAL at 15:01

## 2024-12-27 ASSESSMENT — PAIN DESCRIPTION - DESCRIPTORS: DESCRIPTORS: SHARP

## 2024-12-27 ASSESSMENT — PAIN DESCRIPTION - FREQUENCY: FREQUENCY: INTERMITTENT

## 2024-12-27 ASSESSMENT — PAIN DESCRIPTION - ORIENTATION: ORIENTATION: LEFT

## 2024-12-27 ASSESSMENT — PAIN SCALES - GENERAL: PAINLEVEL_OUTOF10: 7

## 2024-12-27 ASSESSMENT — PAIN DESCRIPTION - LOCATION: LOCATION: CHEST;ARM

## 2024-12-27 ASSESSMENT — PAIN DESCRIPTION - PAIN TYPE: TYPE: ACUTE PAIN

## 2024-12-27 NOTE — ED TRIAGE NOTES
Pt presents ambulatory to ED via triage for c/o left sided chest pain and left arm pain that started about 45 minutes ago and comes and goes. Pt reports he has a loop in his chest. Pt takes daily aspirin.

## 2024-12-28 LAB
ALBUMIN SERPL-MCNC: 3.1 G/DL (ref 3.5–5)
ALBUMIN/GLOB SERPL: 1 (ref 1.1–2.2)
ALP SERPL-CCNC: 110 U/L (ref 45–117)
ALT SERPL-CCNC: 29 U/L (ref 12–78)
ANION GAP SERPL CALC-SCNC: 2 MMOL/L (ref 2–12)
AST SERPL-CCNC: 17 U/L (ref 15–37)
BASOPHILS # BLD: 0.1 K/UL (ref 0–0.1)
BASOPHILS NFR BLD: 1 % (ref 0–1)
BILIRUB SERPL-MCNC: 0.4 MG/DL (ref 0.2–1)
BUN SERPL-MCNC: 11 MG/DL (ref 6–20)
BUN/CREAT SERPL: 10 (ref 12–20)
CALCIUM SERPL-MCNC: 8.7 MG/DL (ref 8.5–10.1)
CHLORIDE SERPL-SCNC: 108 MMOL/L (ref 97–108)
CO2 SERPL-SCNC: 27 MMOL/L (ref 21–32)
CREAT SERPL-MCNC: 1.05 MG/DL (ref 0.7–1.3)
DIFFERENTIAL METHOD BLD: ABNORMAL
EOSINOPHIL # BLD: 0.3 K/UL (ref 0–0.4)
EOSINOPHIL NFR BLD: 4 % (ref 0–7)
ERYTHROCYTE [DISTWIDTH] IN BLOOD BY AUTOMATED COUNT: 13.2 % (ref 11.5–14.5)
GLOBULIN SER CALC-MCNC: 3.1 G/DL (ref 2–4)
GLUCOSE SERPL-MCNC: 95 MG/DL (ref 65–100)
HCT VFR BLD AUTO: 44.1 % (ref 36.6–50.3)
HGB BLD-MCNC: 15.1 G/DL (ref 12.1–17)
IMM GRANULOCYTES # BLD AUTO: 0.1 K/UL (ref 0–0.04)
IMM GRANULOCYTES NFR BLD AUTO: 1 % (ref 0–0.5)
LYMPHOCYTES # BLD: 2.3 K/UL (ref 0.8–3.5)
LYMPHOCYTES NFR BLD: 36 % (ref 12–49)
MCH RBC QN AUTO: 31 PG (ref 26–34)
MCHC RBC AUTO-ENTMCNC: 34.2 G/DL (ref 30–36.5)
MCV RBC AUTO: 90.6 FL (ref 80–99)
MONOCYTES # BLD: 0.7 K/UL (ref 0–1)
MONOCYTES NFR BLD: 11 % (ref 5–13)
NEUTS SEG # BLD: 3.1 K/UL (ref 1.8–8)
NEUTS SEG NFR BLD: 47 % (ref 32–75)
NRBC # BLD: 0 K/UL (ref 0–0.01)
NRBC BLD-RTO: 0 PER 100 WBC
PLATELET # BLD AUTO: 142 K/UL (ref 150–400)
PMV BLD AUTO: 9.9 FL (ref 8.9–12.9)
POTASSIUM SERPL-SCNC: 4.5 MMOL/L (ref 3.5–5.1)
PROT SERPL-MCNC: 6.2 G/DL (ref 6.4–8.2)
RBC # BLD AUTO: 4.87 M/UL (ref 4.1–5.7)
SODIUM SERPL-SCNC: 137 MMOL/L (ref 136–145)
WBC # BLD AUTO: 6.5 K/UL (ref 4.1–11.1)

## 2024-12-28 PROCEDURE — G0378 HOSPITAL OBSERVATION PER HR: HCPCS

## 2024-12-28 PROCEDURE — 85025 COMPLETE CBC W/AUTO DIFF WBC: CPT

## 2024-12-28 PROCEDURE — 6370000000 HC RX 637 (ALT 250 FOR IP): Performed by: NURSE PRACTITIONER

## 2024-12-28 PROCEDURE — 96372 THER/PROPH/DIAG INJ SC/IM: CPT

## 2024-12-28 PROCEDURE — 80053 COMPREHEN METABOLIC PANEL: CPT

## 2024-12-28 PROCEDURE — 6360000002 HC RX W HCPCS: Performed by: STUDENT IN AN ORGANIZED HEALTH CARE EDUCATION/TRAINING PROGRAM

## 2024-12-28 PROCEDURE — 6370000000 HC RX 637 (ALT 250 FOR IP): Performed by: STUDENT IN AN ORGANIZED HEALTH CARE EDUCATION/TRAINING PROGRAM

## 2024-12-28 PROCEDURE — 2500000003 HC RX 250 WO HCPCS: Performed by: STUDENT IN AN ORGANIZED HEALTH CARE EDUCATION/TRAINING PROGRAM

## 2024-12-28 PROCEDURE — 94640 AIRWAY INHALATION TREATMENT: CPT

## 2024-12-28 PROCEDURE — 36415 COLL VENOUS BLD VENIPUNCTURE: CPT

## 2024-12-28 RX ORDER — ARFORMOTEROL TARTRATE 15 UG/2ML
15 SOLUTION RESPIRATORY (INHALATION)
Status: DISCONTINUED | OUTPATIENT
Start: 2024-12-28 | End: 2024-12-29 | Stop reason: HOSPADM

## 2024-12-28 RX ORDER — BUDESONIDE 0.5 MG/2ML
1 INHALANT ORAL
Status: DISCONTINUED | OUTPATIENT
Start: 2024-12-28 | End: 2024-12-29 | Stop reason: HOSPADM

## 2024-12-28 RX ORDER — BUDESONIDE 0.5 MG/2ML
1 INHALANT ORAL
Status: DISCONTINUED | OUTPATIENT
Start: 2024-12-28 | End: 2024-12-28

## 2024-12-28 RX ORDER — ARFORMOTEROL TARTRATE 15 UG/2ML
15 SOLUTION RESPIRATORY (INHALATION)
Status: DISCONTINUED | OUTPATIENT
Start: 2024-12-28 | End: 2024-12-28

## 2024-12-28 RX ORDER — LOPERAMIDE HYDROCHLORIDE 2 MG/1
2 CAPSULE ORAL 4 TIMES DAILY PRN
Status: DISCONTINUED | OUTPATIENT
Start: 2024-12-28 | End: 2024-12-29 | Stop reason: HOSPADM

## 2024-12-28 RX ADMIN — CLONAZEPAM 2 MG: 1 TABLET ORAL at 09:15

## 2024-12-28 RX ADMIN — ENOXAPARIN SODIUM 30 MG: 100 INJECTION SUBCUTANEOUS at 21:33

## 2024-12-28 RX ADMIN — ARFORMOTEROL TARTRATE 15 MCG: 15 SOLUTION RESPIRATORY (INHALATION) at 20:18

## 2024-12-28 RX ADMIN — IPRATROPIUM BROMIDE 0.5 MG: 0.5 SOLUTION RESPIRATORY (INHALATION) at 13:35

## 2024-12-28 RX ADMIN — GABAPENTIN 900 MG: 300 CAPSULE ORAL at 21:33

## 2024-12-28 RX ADMIN — IPRATROPIUM BROMIDE 0.5 MG: 0.5 SOLUTION RESPIRATORY (INHALATION) at 20:14

## 2024-12-28 RX ADMIN — FLUOXETINE HYDROCHLORIDE 40 MG: 20 CAPSULE ORAL at 00:01

## 2024-12-28 RX ADMIN — MONTELUKAST 10 MG: 10 TABLET, FILM COATED ORAL at 09:10

## 2024-12-28 RX ADMIN — BUDESONIDE 1000 MCG: 0.5 INHALANT RESPIRATORY (INHALATION) at 20:18

## 2024-12-28 RX ADMIN — FLUOXETINE HYDROCHLORIDE 40 MG: 20 CAPSULE ORAL at 21:32

## 2024-12-28 RX ADMIN — ATORVASTATIN CALCIUM 40 MG: 40 TABLET, FILM COATED ORAL at 21:30

## 2024-12-28 RX ADMIN — GABAPENTIN 900 MG: 300 CAPSULE ORAL at 09:10

## 2024-12-28 RX ADMIN — ENOXAPARIN SODIUM 30 MG: 100 INJECTION SUBCUTANEOUS at 00:03

## 2024-12-28 RX ADMIN — PANTOPRAZOLE SODIUM 40 MG: 40 TABLET, DELAYED RELEASE ORAL at 00:02

## 2024-12-28 RX ADMIN — ARIPIPRAZOLE 10 MG: 5 TABLET ORAL at 00:02

## 2024-12-28 RX ADMIN — ARIPIPRAZOLE 10 MG: 5 TABLET ORAL at 17:35

## 2024-12-28 RX ADMIN — ZOLPIDEM TARTRATE 10 MG: 5 TABLET ORAL at 21:33

## 2024-12-28 RX ADMIN — ATORVASTATIN CALCIUM 40 MG: 40 TABLET, FILM COATED ORAL at 00:03

## 2024-12-28 RX ADMIN — SODIUM CHLORIDE, PRESERVATIVE FREE 10 ML: 5 INJECTION INTRAVENOUS at 21:35

## 2024-12-28 RX ADMIN — LOPERAMIDE HYDROCHLORIDE 2 MG: 2 CAPSULE ORAL at 17:40

## 2024-12-28 RX ADMIN — BUDESONIDE 1000 MCG: 0.5 SUSPENSION RESPIRATORY (INHALATION) at 07:14

## 2024-12-28 RX ADMIN — SODIUM CHLORIDE, PRESERVATIVE FREE 10 ML: 5 INJECTION INTRAVENOUS at 01:31

## 2024-12-28 RX ADMIN — AMLODIPINE BESYLATE 5 MG: 5 TABLET ORAL at 09:10

## 2024-12-28 RX ADMIN — SODIUM CHLORIDE, PRESERVATIVE FREE 10 ML: 5 INJECTION INTRAVENOUS at 09:11

## 2024-12-28 RX ADMIN — FLUOXETINE HYDROCHLORIDE 40 MG: 20 CAPSULE ORAL at 09:10

## 2024-12-28 RX ADMIN — CLONAZEPAM 2 MG: 1 TABLET ORAL at 21:33

## 2024-12-28 RX ADMIN — ASPIRIN 81 MG: 81 TABLET, CHEWABLE ORAL at 09:10

## 2024-12-28 RX ADMIN — PANTOPRAZOLE SODIUM 40 MG: 40 TABLET, DELAYED RELEASE ORAL at 21:30

## 2024-12-28 RX ADMIN — ENOXAPARIN SODIUM 30 MG: 100 INJECTION SUBCUTANEOUS at 09:10

## 2024-12-28 RX ADMIN — ZOLPIDEM TARTRATE 10 MG: 5 TABLET ORAL at 01:31

## 2024-12-28 RX ADMIN — ARFORMOTEROL TARTRATE 15 MCG: 15 SOLUTION RESPIRATORY (INHALATION) at 07:13

## 2024-12-28 RX ADMIN — IPRATROPIUM BROMIDE 0.5 MG: 0.5 SOLUTION RESPIRATORY (INHALATION) at 07:20

## 2024-12-28 RX ADMIN — GABAPENTIN 900 MG: 300 CAPSULE ORAL at 00:13

## 2024-12-28 RX ADMIN — PANTOPRAZOLE SODIUM 40 MG: 40 TABLET, DELAYED RELEASE ORAL at 09:10

## 2024-12-28 ASSESSMENT — PAIN SCALES - GENERAL: PAINLEVEL_OUTOF10: 0

## 2024-12-28 NOTE — PLAN OF CARE
Problem: Chronic Conditions and Co-morbidities  Goal: Patient's chronic conditions and co-morbidity symptoms are monitored and maintained or improved  Outcome: Progressing  Flowsheets (Taken 12/28/2024 0149 by Keisha Sherman RN)  Care Plan - Patient's Chronic Conditions and Co-Morbidity Symptoms are Monitored and Maintained or Improved: Monitor and assess patient's chronic conditions and comorbid symptoms for stability, deterioration, or improvement     Problem: Discharge Planning  Goal: Discharge to home or other facility with appropriate resources  12/28/2024 1202 by Luna Shields RN  Outcome: Progressing  12/28/2024 0600 by Keisha Sherman RN  Outcome: Progressing  Flowsheets (Taken 12/28/2024 0149)  Discharge to home or other facility with appropriate resources: Identify barriers to discharge with patient and caregiver     Problem: Pain  Goal: Verbalizes/displays adequate comfort level or baseline comfort level  12/28/2024 1202 by Luna Shields, RN  Outcome: Progressing  12/28/2024 0600 by Keisha Sherman RN  Outcome: Progressing  12/28/2024 0559 by Keisha Sherman RN  Outcome: Progressing  Flowsheets (Taken 12/28/2024 0559)  Verbalizes/displays adequate comfort level or baseline comfort level:   Encourage patient to monitor pain and request assistance   Assess pain using appropriate pain scale   Administer analgesics based on type and severity of pain and evaluate response   Implement non-pharmacological measures as appropriate and evaluate response   Consider cultural and social influences on pain and pain management     Problem: ABCDS Injury Assessment  Goal: Absence of physical injury  12/28/2024 1202 by Luna Shields, RN  Outcome: Progressing  12/28/2024 0600 by Keisha Sherman RN  Outcome: Progressing  12/28/2024 0559 by Keisha Sherman RN  Outcome: Progressing  Flowsheets (Taken 12/28/2024 0559)  Absence of Physical Injury: Implement safety measures based on patient assessment     Problem: ABCDS Injury

## 2024-12-28 NOTE — CONSULTS
IP Cardiology Consult       Date of consult:  12/28/24  Date of admission: 12/27/2024  Primary Cardiologist: Dr Acosta / Dr Phipps   Physician Requesting consult: Dr Pat      Assessment:    Chest pain  MPI in 11/2023 unremarkable   Cath in 2021 showed no significant CAD   TIA  Loop recorder     Problem List:  1.  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  MPI/echo unrevealing 11/23.  2.  HTN  3.  XOL  4.  GERD  5.  Former EtOH abuse  6.  COPD  7.  Former smoker; Q '19    , 2 kids, no e/t/d, on disability.    Troponin 4> 5       Recommendations:    Continue aspirin  Continue Lipitor 40 mg daily  Continue amlodipine 5 mg daily  He is ruled out for ACS by cardiac enzymes.  Will plan for stress test.  He would like to perform this as outpatient.  Will monitor for recurrent chest pain, if no recurrent chest pain then he can be discharged tomorrow morning and we will schedule him for outpatient stress test.          [x]        High complexity decision making was performed      CC / Reason for consult: Chest pain    History of the presenting illness:  Alexandr Bush is a 59 y.o. male with past medical history of hypertension, hyperlipidemia, TIA presented to emergency room with chest pain.  He started having chest pain at rest yesterday, sharp pain and sometimes pressure-like pain.  He also felt sharp pain in the arm.  Pain lasted for 1 to 2 hours and then resolved.  No recurrent episode.  Pain is similar to pain he had last year.  No particular aggravating factor.  Pain was not reproducible, not worse with movement.  Associated with nausea.  No shortness of breath or diaphoresis.  No chest pain or shortness of breath with exertion.  His activities are limited due to knee pain.  EKG is unremarkable.  Troponin remain in normal range.    Past Medical History:   Diagnosis Date    At risk for sleep apnea     Cholelithiasis 8/22/2011    COPD (chronic obstructive pulmonary disease) (HCC)     High cholesterol

## 2024-12-28 NOTE — H&P
Hospitalist Admission Note    NAME:Alexandr Bush   : 1965   MRN: [id number]     Date/Time: 2024 10:25 PM    Patient PCP: Christy Taylor APRN - NP    *Please be aware this note is formulated with assistance from Dragon voice-recognition dictation software. Please excuse any errors that may be present*    ______________________________________________________________________  Given the patient's current clinical presentation, I have a high level of concern for decompensation if discharged from the emergency department.  Complex decision making was performed, which includes reviewing the patient's available past medical records, laboratory results, and x-ray films.       My assessment of this patient's clinical condition and my plan of care is as follows.    Problem List:  Patient Active Problem List   Diagnosis    Abdominal pain    Status post laparoscopic cholecystectomy    Transient ischemic attack involving right internal carotid artery    Alcohol abuse, in remission    Ecchymosis    Other male erectile dysfunction    History of chest pain    Small vessel disease, cerebrovascular    CVA (cerebral vascular accident) (HCC)    Chest pain    CVA (cerebrovascular accident due to intracerebral hemorrhage) (HCC)    Left-sided weakness    Dizziness         Assessment / Plan:    Chest pain  Episode of chest pain this evening occurred at rest, lasting 1 hour, central chest with radiation into left arm, felt like pressure. Resolved with nitroglycerin in the ED, now chest pain free.   - Troponins negative x2   - EKG no acute ischemia  - Last ischemic testing on our file is from 2019 stress test   - Admit for observation  - Monitor on telemetry  - Continue home aspirin, statin  - Trend troponin   - 2d echo  - Cardiology consulted, appreciate assistance       Hx of HTN  - Continue home amlodipine    COPD  - Not in exacerbation, continue home inhalers     Hx of etoh use, now abstinent   Hx of anxiety   -Continue

## 2024-12-28 NOTE — CARE COORDINATION
Care Management Initial Assessment       RUR:  OBS  BROWN letter delivered 12/28/24  Readmission? No  1st IM letter given? No  1st  letter given: NO    Plan Home with Wife  Appt prior to DC.   Wife will transport.     3:58 PM   CM completed evaluation with Pt and verified demographic information.   Pt lives with wife in one story home with 3 MELITON.   NO DME  No HH or rehab experience.   I W ADLs.   Drives.   Pharmacy: [facility] in [place].     Anticipating DC home tomorrow, 12/29/24.   Needs Cardiology clearance and Echo.     CM will continue to monitor discharge plan.     Otilia Woodard, Weekend CM  [id number]       12/28/24 6845   Service Assessment   Patient Orientation Alert and Oriented   Cognition Alert   History Provided By Patient   Primary Caregiver Self   Support Systems Spouse/Significant Other;Children;Family Members;Friends/Neighbors;[employer]   Patient's Healthcare Decision Maker is: Patient Declined (Legal Next of Kin Remains as Decision Maker)  (Spouse: Chloe Bush)   PCP Verified by CM Yes  (NP Edilia Taylor)   Last Visit to PCP Within last 3 months   Prior Functional Level Independent in ADLs/IADLs   Current Functional Level Independent in ADLs/IADLs   Can patient return to prior living arrangement Yes   Ability to make needs known: Good   Family able to assist with home care needs: Yes   Would you like for me to discuss the discharge plan with any other family members/significant others, and if so, who? Yes   Social/Functional History   Lives With Spouse   Type of Home House   Home Layout One level   Home Access Stairs to enter with rails   Entrance Stairs - Number of Steps 3   Home Equipment None   Receives Help From Family   Active  Yes   Mode of Transportation Car   Occupation On disability   Discharge Planning   Type of Residence House   Living Arrangements Spouse/Significant Other   Current Services Prior To Admission None   Potential Assistance Needed N/A   DME Ordered? No

## 2024-12-28 NOTE — PLAN OF CARE
Problem: Discharge Planning  Goal: Discharge to home or other facility with appropriate resources  Outcome: Progressing  Flowsheets (Taken 12/28/2024 0149)  Discharge to home or other facility with appropriate resources: Identify barriers to discharge with patient and caregiver     Problem: Pain  Goal: Verbalizes/displays adequate comfort level or baseline comfort level  12/28/2024 0600 by Keisha Sherman RN  Outcome: Progressing  12/28/2024 0559 by Keisha Sherman RN  Outcome: Progressing  Flowsheets (Taken 12/28/2024 0559)  Verbalizes/displays adequate comfort level or baseline comfort level:   Encourage patient to monitor pain and request assistance   Assess pain using appropriate pain scale   Administer analgesics based on type and severity of pain and evaluate response   Implement non-pharmacological measures as appropriate and evaluate response   Consider cultural and social influences on pain and pain management     Problem: ABCDS Injury Assessment  Goal: Absence of physical injury  12/28/2024 0600 by Keisha Sherman RN  Outcome: Progressing  12/28/2024 0559 by Keisha Sherman RN  Outcome: Progressing  Flowsheets (Taken 12/28/2024 0559)  Absence of Physical Injury: Implement safety measures based on patient assessment

## 2024-12-28 NOTE — PLAN OF CARE
Problem: Pain  Goal: Verbalizes/displays adequate comfort level or baseline comfort level  12/28/2024 1705 by Luna Shields, RN  Outcome: Progressing  12/28/2024 1202 by Luna Shields, RN  Outcome: Progressing  12/28/2024 0600 by Keisha Sherman, REBECA  Outcome: Progressing  12/28/2024 0559 by Keisha Sherman, RN  Outcome: Progressing  Flowsheets (Taken 12/28/2024 0559)  Verbalizes/displays adequate comfort level or baseline comfort level:   Encourage patient to monitor pain and request assistance   Assess pain using appropriate pain scale   Administer analgesics based on type and severity of pain and evaluate response   Implement non-pharmacological measures as appropriate and evaluate response   Consider cultural and social influences on pain and pain management   Patient to remain without chest pain through the PM in order to be discharged to home on 12/29

## 2024-12-28 NOTE — ED NOTES
ED TO INPATIENT SBAR HANDOFF        Verbal report given to REBECA Boles on Alexandr Bush  being transferred to [facility] for routine progression of patient care       Patient Name: Alexandr Bush   Preferred Name: Alexandr VERDUZCOB: 1965  59 y.o.   Family/Caregiver Present: no   Code Status Order: Full Code  PO Status: NPO:No  Telemetry Order: Yes  C-SSRS: Risk of Suicide: No Risk  Sitter no  N/A  Restraints:       Information from the following report(s) Nurse Handoff Report, ED Encounter Summary, ED SBAR, Adult Overview, MAR, and Neuro Assessment was reviewed with the receiving nurse.    [place] Fall Assessment:    Presents to emergency department  because of falls (Syncope, seizure, or loss of consciousness): No  Age > 70: No  Altered Mental Status, Intoxication with alcohol or substance confusion (Disorientation, impaired judgment, poor safety awaremess, or inability to follow instructions): No  Impaired Mobility: Ambulates or transfers with assistive devices or assistance; Unable to ambulate or transer.: No  Nursing Judgement: No          Situation  Chief Complaint   Patient presents with    Chest Pain     Pt presents ambulatory to ED via triage for c/o left sided chest pain and left arm pain that started about 45 minutes ago and comes and goes. Pt reports he has a loop in his chest. Pt takes daily aspirin. H/o TIA.     Brief Description of Patient's Condition: n/a  Mental Status: oriented, alert, coherent, logical, thought processes intact, and able to concentrate and follow conversation  Arrived from:Home  Imaging:   XR CHEST PORTABLE   Final Result      No acute process on portable chest.         Electronically signed by Hal Huynh        Abnormal labs:   Abnormal Labs Reviewed   CBC WITH AUTO DIFFERENTIAL - Abnormal; Notable for the following components:       Result Value    Immature Granulocytes % 1 (*)     Immature Granulocytes Absolute 0.1 (*)     All other components within normal limits   COMPREHENSIVE METABOLIC

## 2024-12-28 NOTE — ED PROVIDER NOTES
ms    P Axis 65 degrees    R Axis 62 degrees    T Axis 67 degrees    Diagnosis       Sinus rhythm with 1st degree AV block  When compared with ECG of 10-AUG-2024 11:40,  No significant change was found  Confirmed by Spike Acosta MD ([id number]) on 12/27/2024 3:53:58 PM     CBC with Auto Differential    Collection Time: 12/27/24  2:25 PM   Result Value Ref Range    WBC 6.3 4.1 - 11.1 K/uL    RBC 5.01 4.10 - 5.70 M/uL    Hemoglobin 15.5 12.1 - 17.0 g/dL    Hematocrit 45.1 36.6 - 50.3 %    MCV 90.0 80.0 - 99.0 FL    MCH 30.9 26.0 - 34.0 PG    MCHC 34.4 30.0 - 36.5 g/dL    RDW 13.1 11.5 - 14.5 %    Platelets 150 150 - 400 K/uL    MPV 9.7 8.9 - 12.9 FL    Nucleated RBCs 0.0 0  WBC    nRBC 0.00 0.00 - 0.01 K/uL    Neutrophils % 48 32 - 75 %    Lymphocytes % 37 12 - 49 %    Monocytes % 9 5 - 13 %    Eosinophils % 4 0 - 7 %    Basophils % 1 0 - 1 %    Immature Granulocytes % 1 (H) 0.0 - 0.5 %    Neutrophils Absolute 3.0 1.8 - 8.0 K/UL    Lymphocytes Absolute 2.3 0.8 - 3.5 K/UL    Monocytes Absolute 0.6 0.0 - 1.0 K/UL    Eosinophils Absolute 0.3 0.0 - 0.4 K/UL    Basophils Absolute 0.0 0.0 - 0.1 K/UL    Immature Granulocytes Absolute 0.1 (H) 0.00 - 0.04 K/UL    Differential Type AUTOMATED     Comprehensive Metabolic Panel w/ Reflex to MG    Collection Time: 12/27/24  2:25 PM   Result Value Ref Range    Sodium 137 136 - 145 mmol/L    Potassium 3.9 3.5 - 5.1 mmol/L    Chloride 109 (H) 97 - 108 mmol/L    CO2 24 21 - 32 mmol/L    Anion Gap 4 2 - 12 mmol/L    Glucose 112 (H) 65 - 100 mg/dL    BUN 10 6 - 20 MG/DL    Creatinine 0.95 0.70 - 1.30 MG/DL    BUN/Creatinine Ratio 11 (L) 12 - 20      Est, Glom Filt Rate >90 >60 ml/min/1.73m2    Calcium 9.0 8.5 - 10.1 MG/DL    Total Bilirubin 0.4 0.2 - 1.0 MG/DL    ALT 29 12 - 78 U/L    AST 18 15 - 37 U/L    Alk Phosphatase 114 45 - 117 U/L    Total Protein 6.6 6.4 - 8.2 g/dL    Albumin 3.5 3.5 - 5.0 g/dL    Globulin 3.1 2.0 - 4.0 g/dL    Albumin/Globulin Ratio 1.1 1.1 - 2.2

## 2024-12-29 VITALS
HEART RATE: 75 BPM | HEIGHT: 71 IN | SYSTOLIC BLOOD PRESSURE: 140 MMHG | BODY MASS INDEX: 39.44 KG/M2 | TEMPERATURE: 97.6 F | WEIGHT: 281.75 LBS | OXYGEN SATURATION: 93 % | DIASTOLIC BLOOD PRESSURE: 82 MMHG | RESPIRATION RATE: 18 BRPM

## 2024-12-29 LAB
ALBUMIN SERPL-MCNC: 3 G/DL (ref 3.5–5)
ALBUMIN/GLOB SERPL: 1 (ref 1.1–2.2)
ALP SERPL-CCNC: 110 U/L (ref 45–117)
ALT SERPL-CCNC: 31 U/L (ref 12–78)
ANION GAP SERPL CALC-SCNC: 3 MMOL/L (ref 2–12)
AST SERPL-CCNC: 21 U/L (ref 15–37)
BASOPHILS # BLD: 0.1 K/UL (ref 0–0.1)
BASOPHILS NFR BLD: 1 % (ref 0–1)
BILIRUB SERPL-MCNC: 0.6 MG/DL (ref 0.2–1)
BUN SERPL-MCNC: 13 MG/DL (ref 6–20)
BUN/CREAT SERPL: 13 (ref 12–20)
CALCIUM SERPL-MCNC: 8.7 MG/DL (ref 8.5–10.1)
CHLORIDE SERPL-SCNC: 108 MMOL/L (ref 97–108)
CO2 SERPL-SCNC: 26 MMOL/L (ref 21–32)
CREAT SERPL-MCNC: 0.97 MG/DL (ref 0.7–1.3)
DIFFERENTIAL METHOD BLD: ABNORMAL
EOSINOPHIL # BLD: 0.2 K/UL (ref 0–0.4)
EOSINOPHIL NFR BLD: 4 % (ref 0–7)
ERYTHROCYTE [DISTWIDTH] IN BLOOD BY AUTOMATED COUNT: 13.2 % (ref 11.5–14.5)
GLOBULIN SER CALC-MCNC: 3.1 G/DL (ref 2–4)
GLUCOSE SERPL-MCNC: 99 MG/DL (ref 65–100)
HCT VFR BLD AUTO: 44.9 % (ref 36.6–50.3)
HGB BLD-MCNC: 15.4 G/DL (ref 12.1–17)
IMM GRANULOCYTES # BLD AUTO: 0.1 K/UL (ref 0–0.04)
IMM GRANULOCYTES NFR BLD AUTO: 1 % (ref 0–0.5)
LYMPHOCYTES # BLD: 1.8 K/UL (ref 0.8–3.5)
LYMPHOCYTES NFR BLD: 29 % (ref 12–49)
MCH RBC QN AUTO: 31 PG (ref 26–34)
MCHC RBC AUTO-ENTMCNC: 34.3 G/DL (ref 30–36.5)
MCV RBC AUTO: 90.3 FL (ref 80–99)
MONOCYTES # BLD: 0.7 K/UL (ref 0–1)
MONOCYTES NFR BLD: 11 % (ref 5–13)
NEUTS SEG # BLD: 3.5 K/UL (ref 1.8–8)
NEUTS SEG NFR BLD: 55 % (ref 32–75)
NRBC # BLD: 0 K/UL (ref 0–0.01)
NRBC BLD-RTO: 0 PER 100 WBC
PLATELET # BLD AUTO: 138 K/UL (ref 150–400)
PMV BLD AUTO: 9.7 FL (ref 8.9–12.9)
POTASSIUM SERPL-SCNC: 4.4 MMOL/L (ref 3.5–5.1)
PROT SERPL-MCNC: 6.1 G/DL (ref 6.4–8.2)
RBC # BLD AUTO: 4.97 M/UL (ref 4.1–5.7)
SODIUM SERPL-SCNC: 137 MMOL/L (ref 136–145)
WBC # BLD AUTO: 6.3 K/UL (ref 4.1–11.1)

## 2024-12-29 PROCEDURE — G0378 HOSPITAL OBSERVATION PER HR: HCPCS

## 2024-12-29 PROCEDURE — 6360000002 HC RX W HCPCS: Performed by: STUDENT IN AN ORGANIZED HEALTH CARE EDUCATION/TRAINING PROGRAM

## 2024-12-29 PROCEDURE — 96372 THER/PROPH/DIAG INJ SC/IM: CPT

## 2024-12-29 PROCEDURE — 85025 COMPLETE CBC W/AUTO DIFF WBC: CPT

## 2024-12-29 PROCEDURE — 94640 AIRWAY INHALATION TREATMENT: CPT

## 2024-12-29 PROCEDURE — 36415 COLL VENOUS BLD VENIPUNCTURE: CPT

## 2024-12-29 PROCEDURE — 6370000000 HC RX 637 (ALT 250 FOR IP): Performed by: STUDENT IN AN ORGANIZED HEALTH CARE EDUCATION/TRAINING PROGRAM

## 2024-12-29 PROCEDURE — 80053 COMPREHEN METABOLIC PANEL: CPT

## 2024-12-29 PROCEDURE — 2700000000 HC OXYGEN THERAPY PER DAY

## 2024-12-29 RX ADMIN — PANTOPRAZOLE SODIUM 40 MG: 40 TABLET, DELAYED RELEASE ORAL at 09:09

## 2024-12-29 RX ADMIN — GABAPENTIN 900 MG: 300 CAPSULE ORAL at 09:09

## 2024-12-29 RX ADMIN — ENOXAPARIN SODIUM 30 MG: 100 INJECTION SUBCUTANEOUS at 09:09

## 2024-12-29 RX ADMIN — AMLODIPINE BESYLATE 5 MG: 5 TABLET ORAL at 09:09

## 2024-12-29 RX ADMIN — ASPIRIN 81 MG: 81 TABLET, CHEWABLE ORAL at 09:09

## 2024-12-29 RX ADMIN — IPRATROPIUM BROMIDE 0.5 MG: 0.5 SOLUTION RESPIRATORY (INHALATION) at 08:09

## 2024-12-29 RX ADMIN — FLUOXETINE HYDROCHLORIDE 40 MG: 20 CAPSULE ORAL at 09:09

## 2024-12-29 RX ADMIN — MONTELUKAST 10 MG: 10 TABLET, FILM COATED ORAL at 09:10

## 2024-12-29 RX ADMIN — ARFORMOTEROL TARTRATE 15 MCG: 15 SOLUTION RESPIRATORY (INHALATION) at 08:06

## 2024-12-29 RX ADMIN — BUDESONIDE 1000 MCG: 0.5 INHALANT RESPIRATORY (INHALATION) at 08:05

## 2024-12-29 NOTE — CARE COORDINATION
Pt clear from CM standpoint.    Transition of Care Plan:    RUR: N/A - Observation status   Prior Level of Functioning: Independent with ADL's   Disposition: Home with spouse   DELORIS: 12/29  If SNF or IPR: Date FOC offered: N/A  Follow up appointments: PCP/Specialists as indicated  DME needed: None  Transportation at discharge: Pt spouse to transport   IM/IMM Medicare/ letter given: N/A - Obs letter provided on 12/28  Is patient a  and connected with VA? No  Caregiver Contact: Chloe Bush (spouse), [phone]  Discharge Caregiver contacted prior to discharge? To be contacted by pt   Care Conference needed? Not at this time   Barriers to discharge: None     0834 AM: Chart reviewed. CM acknowledged d/c order. Pt to return home with pt spouse; pt spouse to transport pt home. No CM needs identified at this time.      12/29/24 0835   Services At/After Discharge   Transition of Care Consult (CM Consult) Discharge Planning   Services At/After Discharge None   [place] [facility] Information Provided? No   Mode of Transport at Discharge Other (see comment)  (Spouse to transport)   Hospital Transport Time of Discharge 1000   Confirm Follow Up Transport Family   Condition of Participation: Discharge Planning   The Plan for Transition of Care is related to the following treatment goals: Return home independently   The Patient and/or Patient Representative was provided with a Choice of Provider? Patient   The Patient and/Or Patient Representative agree with the Discharge Plan? Yes     VIRI Montero  Care Management  [facility]   [phone]

## 2024-12-29 NOTE — DISCHARGE INSTRUCTIONS
HOSPITALIST DISCHARGE INSTRUCTIONS    NAME: Alexandr Bush   :  1965   MRN:  [id number]     Date/Time:  2024 8:31 AM    ADMIT DATE: 2024   DISCHARGE DATE: 2024     Attending Physician: RINA Ramires - NP      Medications: Per above medication reconciliation.    Pain Management: per above medications    Recommended diet: cardiac diet and diabetic diet    Recommended activity: activity as tolerated    Wound care: None    Indwelling devices:  None    Supplemental Oxygen: None    Required Lab work:  none    Glucose management:   per routine    Code status: Full

## 2024-12-29 NOTE — DISCHARGE SUMMARY
Discharge Summary    Name: Alexandr Bush  [id number]  YOB: 1965 (Age: 59 y.o.)   Date of Admission: 2024  Date of Discharge: 2024  Attending Physician: Jaswinder Pat MD    Discharge Diagnosis:     Chest pain  HTN  Hyperlipidemia  COPD  Hx of etoh use, now abstinent   Hx of anxiety   GERD    Consultations:  IP CONSULT TO CARDIOLOGY      Brief Admission History/Reason for Admission Per Patricio Nails MD:   Alexandr Bush is a 59 y.o.  male with PMHx significant for  has a past medical history of At risk for sleep apnea, Cholelithiasis, COPD (chronic obstructive pulmonary disease) (HCC), High cholesterol, Hypertension, Left sided numbness, Other ill-defined conditions(799.89), Paresthesia, Psychiatric disorder, Seasonal allergies, and Status post laparoscopic cholecystectomy. who presents with the above chief complaint.      We were asked to admit for work up and further evaluation.      Reports 1 hour of central and left sided chest pain which felt like pressure, \"like a weight on my chest\". Lasted 1 hour. Occurred at rest. Associated with nausea, no vomiting, no SOB, no diaphoresis. Notes pain persisted until came to the ED and resolved with nitroglyercin.     Brief Hospital Course by Main Problems:     Chest pain  Episode of chest pain this  occurred at rest, lasting 1 hour, central chest with radiation into left arm, felt like pressure. Resolved with nitroglycerin in the ED, now chest pain free.   - Troponins negative x2   - EKG no acute ischemia  - Last ischemic testing on our file is from 2019 stress test   - Continue home aspirin, statin  - Appreciate Cardiology consult - OK for DC if no further c/o chest pain, can plan for stress test as OP   - Denies any further chest pain since resolution in ED, no CP overnight  - Wife at bedside, patient and wife requesting to be discharged. They have had a death in the family and have a  to attend

## 2024-12-29 NOTE — PROGRESS NOTES
Progress Note      12/29/2024 9:51 AM  NAME: Alexandr Bush   MRN:  [id number]   Admit Diagnosis: Chest pain [R07.9]  Chest pain, unspecified type [R07.9]       Primary Cardiologist: Dr Acosta / Dr Phipps   Physician Requesting consult: Dr Pat        Assessment:     Chest pain  MPI in 11/2023 unremarkable   Cath in 2021 showed no significant CAD   TIA  Loop recorder      Problem List:  1.  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  MPI/echo unrevealing 11/23.  2.  HTN  3.  XOL  4.  GERD  5.  Former EtOH abuse  6.  COPD  7.  Former smoker; Q '19     , 2 kids, no e/t/d, on disability.     Troponin 4> 5         Recommendations:     Continue aspirin  Continue Lipitor 40 mg daily  Continue amlodipine 5 mg daily  He is ruled out for ACS by cardiac enzymes.  Will plan for stress test.  No further episode of CP. He wants to pursue this as OP. Ambulates in hallway and if no symptoms then OP stress test.            [x]        High complexity decision making was performed       Subjective:     HPI:     No more CP.   Wants to go home    ROS: No CP, SOB, Abd pain, nausea, vomiting, syncope, palpitations, new focal neurological symptoms     Objective:      Physical Exam:    Last 24hrs VS reviewed since prior progress note. Most recent are:    BP (!) 140/82   Pulse 75   Temp 97.6 °F (36.4 °C)   Resp 18   Ht 1.803 m (5' 11\")   Wt 127.8 kg (281 lb 12 oz)   SpO2 93%   BMI 39.30 kg/m²     Intake/Output Summary (Last 24 hours) at 12/29/2024 0951  Last data filed at 12/28/2024 1745  Gross per 24 hour   Intake 480 ml   Output 1090 ml   Net -610 ml          General: Alert and oriented x3, no acute distress   Neck: Supple   Respiratory: No respiratory distress, clear lung sound   Cardiovascular: Regular rate rhythm, S1S2, no murmur   Abdomen: soft, non tender, non distended   Neuro: moves all extremities, oriented x3   Skin: warm and dry   Extremity: no edema, warm to touch        Data Review    Telemetry: normal sinus 
Called Cardiovascular service for cardiology consult at [phone] for Dr. Jacob Rush (Was told was on call). Left a message for consult for this patient for chest pain.   1630pm: Dr. Magdaleno Rush saw the Patient at this time at the bedside.   
Discharge instruction explained and given to patient.  
End of Shift Note    Bedside shift change report given to REBECA Carrasco (oncoming nurse) by Keisha Sherman RN (offgoing nurse).  Report included the following information SBAR, MAR, and Cardiac Rhythm      Shift worked:7p-7a         Shift summary and any significant changes:   None         Concerns for physician to address:none      Zone phone for oncoming shift: [phone]         Activity:     Number times ambulated in hallways past shift: 0  Number of times OOB to chair past shift: 1    Cardiac:   Cardiac Monitoring: Yes      Cardiac Rhythm: Sinus rhythm    Access:  Current line(s): PIV     Genitourinary:        Respiratory:   O2 Device: None (Room air)  Chronic home O2 use?: NO  Incentive spirometer at bedside: NO    GI:     Current diet:  ADULT DIET; Regular  Passing flatus: YES    Pain Management:   Patient states pain is manageable on current regimen: NO    Skin:  Cristian Scale Score: 21  Interventions:      Patient Safety:  Fall Risk:    Fall Risk Interventions  Toilet Every 2 Hours-In Advance of Need: No (Comment)  Hourly Visual Checks: Awake, In bed  Fall Visual Posted: Socks  Room Door Open: Yes  Alarm On: Bed  Patient Moved Closer to Nursing Station: No    Active Consults:   IP CONSULT TO CARDIOLOGY    Length of Stay:  Expected LOS: 2  Actual LOS: 0    Keisha Sherman RN                            
End of Shift Note    Bedside shift change report given to Silvia Eisenberg RN (oncoming nurse) by Keisha Sherman RN (offgoing nurse).  Report included the following information Cardiac Rhythm nsr    Shift worked:7p-7a       Shift summary and any significant changes: none, possible D/C home today         Concerns for physician to address: outpatient stress test       Zone phone for oncoming shift:          Activity:  Level of Assistance: Independent  Number times ambulated in hallways past shift: 0  Number of times OOB to chair past shift: 0    Cardiac:   Cardiac Monitoring: Yes      Cardiac Rhythm: Sinus rhythm    Access:  Current line(s): PIV     Genitourinary:   Urinary Status: Voiding    Respiratory:   O2 Device: None (Room air)  Chronic home O2 use?: NO  Incentive spirometer at bedside: NO    GI:     Current diet:  ADULT DIET; Regular  Passing flatus: YES    Pain Management:   Patient states pain is manageable on current regimen: YES    Skin:  Cristian Scale Score: 23  Interventions:      Patient Safety:  Fall Risk:    Fall Risk Interventions  Toilet Every 2 Hours-In Advance of Need: Yes  Hourly Visual Checks: Awake  Fall Visual Posted: Fall sign posted, Socks  Room Door Open: Yes  Alarm On: Bed  Patient Moved Closer to Nursing Station: No    Active Consults:   IP CONSULT TO CARDIOLOGY    Length of Stay:  Expected LOS: 2  Actual LOS: 0    Keisha Sherman RN                            
Patient stated that he ate spinach and corn as part of his dinner and feels like his stomach is upset and usually takes immodium at home. Made  Tobias Conner NP aware. Immodium 2mg PO 4 times a day PRN ordered. Patient given first dose.   
-- -- -- 95 % -- --   12/28/24 0700 119/74 97.8 °F (36.6 °C) Oral -- 16 93 % -- --   12/28/24 0251 (!) 146/128 99.1 °F (37.3 °C) Oral 64 20 94 % -- --   12/28/24 0149 137/86 98 °F (36.7 °C) Oral 71 20 98 % -- --   12/27/24 1900 125/67 -- -- -- 24 -- -- --   12/27/24 1349 135/82 97.3 °F (36.3 °C) Oral 68 18 99 % 1.803 m (5' 11\") 127.8 kg (281 lb 12 oz)         Intake/Output Summary (Last 24 hours) at 12/28/2024 1104  Last data filed at 12/28/2024 0911  Gross per 24 hour   Intake 550 ml   Output --   Net 550 ml        I had a face to face encounter and independently examined this patient on 12/28/2024, as outlined below:  PHYSICAL EXAM:  General: Alert, cooperative  EENT:  EOMI. Anicteric sclerae.  Resp:  CTA bilaterally, no wheezing or rales.  No accessory muscle use  CV:  Regular  rhythm,  No edema  GI:  Soft, obese, Non tender.  +Bowel sounds  Neurologic:  Alert and oriented X 3, normal speech,   Psych:   Good insight. Not anxious nor agitated  Skin:  No rashes.  No jaundice    Reviewed most current lab test results and cultures  YES  Reviewed most current radiology test results   YES  Review and summation of old records today    NO  Reviewed patient's current orders and MAR    YES  PMH/SH reviewed - no change compared to H&P    Procedures: see electronic medical records for all procedures/Xrays and details which were not copied into this note but were reviewed prior to creation of Plan.      LABS:  I reviewed today's most current labs and imaging studies.  Pertinent labs include:  Recent Labs     12/27/24  1425 12/28/24  0507   WBC 6.3 6.5   HGB 15.5 15.1   HCT 45.1 44.1    142*     Recent Labs     12/27/24  1425 12/28/24  0507    137   K 3.9 4.5   * 108   CO2 24 27   GLUCOSE 112* 95   BUN 10 11   CREATININE 0.95 1.05   CALCIUM 9.0 8.7   BILITOT 0.4 0.4   AST 18 17   ALT 29 29       Signed: Tobias Conner, APRN - NP

## 2025-03-29 ENCOUNTER — APPOINTMENT (OUTPATIENT)
Facility: HOSPITAL | Age: 60
End: 2025-03-29
Payer: MEDICARE

## 2025-03-29 ENCOUNTER — HOSPITAL ENCOUNTER (EMERGENCY)
Facility: HOSPITAL | Age: 60
Discharge: HOME | End: 2025-03-29
Attending: EMERGENCY MEDICINE
Payer: MEDICARE

## 2025-03-29 VITALS
OXYGEN SATURATION: 95 % | TEMPERATURE: 97.9 F | SYSTOLIC BLOOD PRESSURE: 111 MMHG | RESPIRATION RATE: 18 BRPM | HEART RATE: 59 BPM | DIASTOLIC BLOOD PRESSURE: 61 MMHG

## 2025-03-29 DIAGNOSIS — R07.9 CHEST PAIN, UNSPECIFIED TYPE: Primary | ICD-10-CM

## 2025-03-29 LAB
ALBUMIN SERPL-MCNC: 3.2 G/DL (ref 3.5–5)
ALBUMIN/GLOB SERPL: 1 (ref 1.1–2.2)
ALP SERPL-CCNC: 114 U/L (ref 45–117)
ALT SERPL-CCNC: 29 U/L (ref 12–78)
ANION GAP SERPL CALC-SCNC: 3 MMOL/L (ref 2–12)
AST SERPL-CCNC: 15 U/L (ref 15–37)
BASOPHILS # BLD: 0.05 K/UL (ref 0–0.1)
BASOPHILS NFR BLD: 0.7 % (ref 0–1)
BILIRUB SERPL-MCNC: 0.5 MG/DL (ref 0.2–1)
BUN SERPL-MCNC: 7 MG/DL (ref 6–20)
BUN/CREAT SERPL: 7 (ref 12–20)
CALCIUM SERPL-MCNC: 8.9 MG/DL (ref 8.5–10.1)
CHLORIDE SERPL-SCNC: 105 MMOL/L (ref 97–108)
CO2 SERPL-SCNC: 28 MMOL/L (ref 21–32)
CREAT SERPL-MCNC: 1.05 MG/DL (ref 0.7–1.3)
DIFFERENTIAL METHOD BLD: ABNORMAL
EOSINOPHIL # BLD: 0.17 K/UL (ref 0–0.4)
EOSINOPHIL NFR BLD: 2.4 % (ref 0–7)
ERYTHROCYTE [DISTWIDTH] IN BLOOD BY AUTOMATED COUNT: 13.7 % (ref 11.5–14.5)
GLOBULIN SER CALC-MCNC: 3.2 G/DL (ref 2–4)
GLUCOSE SERPL-MCNC: 95 MG/DL (ref 65–100)
HCT VFR BLD AUTO: 41.5 % (ref 36.6–50.3)
HGB BLD-MCNC: 14 G/DL (ref 12.1–17)
IMM GRANULOCYTES # BLD AUTO: 0.07 K/UL (ref 0–0.04)
IMM GRANULOCYTES NFR BLD AUTO: 1 % (ref 0–0.5)
LYMPHOCYTES # BLD: 2.77 K/UL (ref 0.8–3.5)
LYMPHOCYTES NFR BLD: 39 % (ref 12–49)
MCH RBC QN AUTO: 30.8 PG (ref 26–34)
MCHC RBC AUTO-ENTMCNC: 33.7 G/DL (ref 30–36.5)
MCV RBC AUTO: 91.2 FL (ref 80–99)
MONOCYTES # BLD: 0.77 K/UL (ref 0–1)
MONOCYTES NFR BLD: 10.8 % (ref 5–13)
NEUTS SEG # BLD: 3.27 K/UL (ref 1.8–8)
NEUTS SEG NFR BLD: 46.1 % (ref 32–75)
NRBC # BLD: 0 K/UL (ref 0–0.01)
NRBC BLD-RTO: 0 PER 100 WBC
PLATELET # BLD AUTO: 143 K/UL (ref 150–400)
PMV BLD AUTO: 9.8 FL (ref 8.9–12.9)
POTASSIUM SERPL-SCNC: 4.2 MMOL/L (ref 3.5–5.1)
PROT SERPL-MCNC: 6.4 G/DL (ref 6.4–8.2)
RBC # BLD AUTO: 4.55 M/UL (ref 4.1–5.7)
SODIUM SERPL-SCNC: 136 MMOL/L (ref 136–145)
TROPONIN I SERPL HS-MCNC: 4 NG/L (ref 0–76)
TROPONIN I SERPL HS-MCNC: 5 NG/L (ref 0–76)
WBC # BLD AUTO: 7.1 K/UL (ref 4.1–11.1)

## 2025-03-29 PROCEDURE — 85025 COMPLETE CBC W/AUTO DIFF WBC: CPT

## 2025-03-29 PROCEDURE — 99285 EMERGENCY DEPT VISIT HI MDM: CPT

## 2025-03-29 PROCEDURE — 6370000000 HC RX 637 (ALT 250 FOR IP): Performed by: EMERGENCY MEDICINE

## 2025-03-29 PROCEDURE — 84484 ASSAY OF TROPONIN QUANT: CPT

## 2025-03-29 PROCEDURE — 36415 COLL VENOUS BLD VENIPUNCTURE: CPT

## 2025-03-29 PROCEDURE — 6360000002 HC RX W HCPCS: Performed by: EMERGENCY MEDICINE

## 2025-03-29 PROCEDURE — 93005 ELECTROCARDIOGRAM TRACING: CPT | Performed by: EMERGENCY MEDICINE

## 2025-03-29 PROCEDURE — 80053 COMPREHEN METABOLIC PANEL: CPT

## 2025-03-29 PROCEDURE — 96374 THER/PROPH/DIAG INJ IV PUSH: CPT

## 2025-03-29 PROCEDURE — 71045 X-RAY EXAM CHEST 1 VIEW: CPT

## 2025-03-29 RX ORDER — KETOROLAC TROMETHAMINE 30 MG/ML
15 INJECTION, SOLUTION INTRAMUSCULAR; INTRAVENOUS ONCE
Status: COMPLETED | OUTPATIENT
Start: 2025-03-29 | End: 2025-03-29

## 2025-03-29 RX ORDER — ACETAMINOPHEN 500 MG
500 TABLET ORAL 4 TIMES DAILY PRN
Qty: 120 TABLET | Refills: 0 | Status: SHIPPED | OUTPATIENT
Start: 2025-03-29

## 2025-03-29 RX ORDER — ACETAMINOPHEN 500 MG
1000 TABLET ORAL
Status: COMPLETED | OUTPATIENT
Start: 2025-03-29 | End: 2025-03-29

## 2025-03-29 RX ORDER — NAPROXEN 500 MG/1
500 TABLET ORAL 2 TIMES DAILY
Qty: 60 TABLET | Refills: 0 | Status: SHIPPED | OUTPATIENT
Start: 2025-03-29

## 2025-03-29 RX ORDER — PREDNISONE 10 MG/1
TABLET ORAL
Qty: 42 TABLET | Refills: 0 | Status: SHIPPED | OUTPATIENT
Start: 2025-03-29

## 2025-03-29 RX ADMIN — ACETAMINOPHEN 1000 MG: 500 TABLET, FILM COATED ORAL at 15:32

## 2025-03-29 RX ADMIN — KETOROLAC TROMETHAMINE 15 MG: 30 INJECTION, SOLUTION INTRAMUSCULAR at 15:32

## 2025-03-29 ASSESSMENT — PAIN DESCRIPTION - ORIENTATION
ORIENTATION: MID
ORIENTATION: MID

## 2025-03-29 ASSESSMENT — PAIN SCALES - GENERAL
PAINLEVEL_OUTOF10: 0
PAINLEVEL_OUTOF10: 7
PAINLEVEL_OUTOF10: 3
PAINLEVEL_OUTOF10: 7

## 2025-03-29 ASSESSMENT — PAIN DESCRIPTION - LOCATION
LOCATION: CHEST
LOCATION: CHEST

## 2025-03-29 ASSESSMENT — PAIN - FUNCTIONAL ASSESSMENT: PAIN_FUNCTIONAL_ASSESSMENT: 0-10

## 2025-03-29 ASSESSMENT — LIFESTYLE VARIABLES
HOW MANY STANDARD DRINKS CONTAINING ALCOHOL DO YOU HAVE ON A TYPICAL DAY: PATIENT DOES NOT DRINK
HOW OFTEN DO YOU HAVE A DRINK CONTAINING ALCOHOL: MONTHLY OR LESS

## 2025-03-29 ASSESSMENT — PAIN DESCRIPTION - DESCRIPTORS
DESCRIPTORS: ACHING
DESCRIPTORS: PRESSURE

## 2025-03-29 ASSESSMENT — HEART SCORE: ECG: NON-SPECIFC REPOLARIZATION DISTURBANCE/LBTB/PM

## 2025-03-29 NOTE — ED PROVIDER NOTES
[facility] EMERGENCY DEPARTMENT  EMERGENCY DEPARTMENT ENCOUNTER       Pt Name: Alexandr Bush  MRN: [id number]  Birthdate 1965  Date of evaluation: 3/29/2025  Provider: Mirza Castillo MD   PCP: Christy Taylor APRN - NP  Note Started: 5:58 PM EDT 3/29/25     CHIEF COMPLAINT       Chief Complaint   Patient presents with    Chest Pain     Ambulatory to triage c/o midsternal chest tightness \"like someone sitting on my chest\", SOB, and L arm pain that started about an hour ago while at rest. Pt states he took 2x SL nitro prior to arrival without relief.        HISTORY OF PRESENT ILLNESS: 1 or more elements      History From: Patient  HPI Limitations: None     Alexandr Bush is a 60 y.o. male who presents with history of COPD, hypertension, hyperlipidemia, presenting with complaints of left-sided chest pain that has been present since waking up this morning.  It is not exertional and has had many similar episodes with similar pain in the past over the last several months.  His chest wall is [place] normal but he is unclear when he should and should not come to the ER.  He has no diaphoresis, no nausea or vomiting, no other leg swelling, orthopnea, or PND.     Nursing Notes were all reviewed and agreed with or any disagreements were addressed in the HPI.     REVIEW OF SYSTEMS      Review of Systems     Positives and Pertinent negatives as per HPI.    PAST HISTORY     Past Medical History:  Past Medical History:   Diagnosis Date    At risk for sleep apnea     Cholelithiasis 8/22/2011    COPD (chronic obstructive pulmonary disease) (HCC)     High cholesterol     Hypertension     Left sided numbness 5/5/2022    Other ill-defined conditions(799.89)     hypercholesterolemia    Paresthesia 5/4/2022    Psychiatric disorder     anxiety    Seasonal allergies     Status post laparoscopic cholecystectomy 8/25/11         Past Surgical History:  Past Surgical History:   Procedure Laterality Date    CHOLECYSTECTOMY       - 3.50 K/UL    Monocytes Absolute 0.77 0.00 - 1.00 K/UL    Eosinophils Absolute 0.17 0.00 - 0.40 K/UL    Basophils Absolute 0.05 0.00 - 0.10 K/UL    Immature Granulocytes Absolute 0.07 (H) 0.00 - 0.04 K/UL    Differential Type AUTOMATED     Comprehensive Metabolic Panel w/ Reflex to MG    Collection Time: 25  3:17 PM   Result Value Ref Range    Sodium 136 136 - 145 mmol/L    Potassium 4.2 3.5 - 5.1 mmol/L    Chloride 105 97 - 108 mmol/L    CO2 28 21 - 32 mmol/L    Anion Gap 3 2 - 12 mmol/L    Glucose 95 65 - 100 mg/dL    BUN 7 6 - 20 MG/DL    Creatinine 1.05 0.70 - 1.30 MG/DL    BUN/Creatinine Ratio 7 (L) 12 - 20      Est, Glom Filt Rate 80 >60 ml/min/1.73m2    Calcium 8.9 8.5 - 10.1 MG/DL    Total Bilirubin 0.5 0.2 - 1.0 MG/DL    ALT 29 12 - 78 U/L    AST 15 15 - 37 U/L    Alk Phosphatase 114 45 - 117 U/L    Total Protein 6.4 6.4 - 8.2 g/dL    Albumin 3.2 (L) 3.5 - 5.0 g/dL    Globulin 3.2 2.0 - 4.0 g/dL    Albumin/Globulin Ratio 1.0 (L) 1.1 - 2.2     Troponin    Collection Time: 25  3:17 PM   Result Value Ref Range    Troponin, High Sensitivity 4 0 - 76 ng/L   Troponin    Collection Time: 25  4:36 PM   Result Value Ref Range    Troponin, High Sensitivity 5 0 - 76 ng/L           EKG: As below     RADIOLOGY:  Non-plain film images such as CT, Ultrasound and MRI are read by the radiologist. Plain radiographic images are visualized and preliminarily interpreted by the ED Provider with the below findings:          Interpretation per the Radiologist below, if available at the time of this note:     XR CHEST 1 VIEW   Final Result   No acute cardiopulmonary process.      Electronically signed by Anand Jean-Baptiste              PROCEDURES   Unless otherwise noted below, none  Procedures       CRITICAL CARE TIME        SCREENINGS   NIH Stroke Score       Heart Score  History: 0  EC  Patient Age: 1  *Risk factors for Atherosclerotic disease: Diabetes Mellitus; Hypercholesterolemia; Hypertension  Risk

## 2025-03-29 NOTE — DISCHARGE INSTRUCTIONS
Thank you for choosing our Emergency Department for your care.  It is our privilege to care for you in your time of need.  In the next several days, you may receive a survey via email or mailed to your home about your experience with our team.  We would greatly appreciate you taking a few minutes to complete the survey, as we use this information to learn what we have done well and what we could be doing better. Thank you for trusting us with your care!    Below you will find a list of your tests from today's visit.   Labs and Radiology Studies  Recent Results (from the past 12 hours)   EKG 12 Lead    Collection Time: 03/29/25  2:39 PM   Result Value Ref Range    Ventricular Rate 61 BPM    Atrial Rate 61 BPM    P-R Interval 234 ms    QRS Duration 86 ms    Q-T Interval 402 ms    QTc Calculation (Bazett) 404 ms    P Axis 42 degrees    R Axis 35 degrees    T Axis 61 degrees    Diagnosis       Sinus rhythm with 1st degree AV block  When compared with ECG of 27-DEC-2024 13:54,  No significant change was found     CBC with Auto Differential    Collection Time: 03/29/25  3:17 PM   Result Value Ref Range    WBC 7.1 4.1 - 11.1 K/uL    RBC 4.55 4.10 - 5.70 M/uL    Hemoglobin 14.0 12.1 - 17.0 g/dL    Hematocrit 41.5 36.6 - 50.3 %    MCV 91.2 80.0 - 99.0 FL    MCH 30.8 26.0 - 34.0 PG    MCHC 33.7 30.0 - 36.5 g/dL    RDW 13.7 11.5 - 14.5 %    Platelets 143 (L) 150 - 400 K/uL    MPV 9.8 8.9 - 12.9 FL    Nucleated RBCs 0.0 0  WBC    nRBC 0.00 0.00 - 0.01 K/uL    Neutrophils % 46.1 32.0 - 75.0 %    Lymphocytes % 39.0 12.0 - 49.0 %    Monocytes % 10.8 5.0 - 13.0 %    Eosinophils % 2.4 0.0 - 7.0 %    Basophils % 0.7 0.0 - 1.0 %    Immature Granulocytes % 1.0 (H) 0.0 - 0.5 %    Neutrophils Absolute 3.27 1.80 - 8.00 K/UL    Lymphocytes Absolute 2.77 0.80 - 3.50 K/UL    Monocytes Absolute 0.77 0.00 - 1.00 K/UL    Eosinophils Absolute 0.17 0.00 - 0.40 K/UL    Basophils Absolute 0.05 0.00 - 0.10 K/UL    Immature Granulocytes

## 2025-03-30 LAB
EKG ATRIAL RATE: 61 BPM
EKG DIAGNOSIS: NORMAL
EKG P AXIS: 42 DEGREES
EKG P-R INTERVAL: 234 MS
EKG Q-T INTERVAL: 402 MS
EKG QRS DURATION: 86 MS
EKG QTC CALCULATION (BAZETT): 404 MS
EKG R AXIS: 35 DEGREES
EKG T AXIS: 61 DEGREES
EKG VENTRICULAR RATE: 61 BPM

## 2025-06-21 ENCOUNTER — APPOINTMENT (OUTPATIENT)
Facility: HOSPITAL | Age: 60
End: 2025-06-21
Payer: MEDICARE

## 2025-06-21 ENCOUNTER — HOSPITAL ENCOUNTER (EMERGENCY)
Facility: HOSPITAL | Age: 60
Discharge: HOME | End: 2025-06-21
Payer: MEDICARE

## 2025-06-21 VITALS
HEART RATE: 64 BPM | TEMPERATURE: 97.5 F | DIASTOLIC BLOOD PRESSURE: 82 MMHG | OXYGEN SATURATION: 92 % | BODY MASS INDEX: 39.05 KG/M2 | RESPIRATION RATE: 15 BRPM | WEIGHT: 280 LBS | SYSTOLIC BLOOD PRESSURE: 134 MMHG

## 2025-06-21 DIAGNOSIS — K57.90 DIVERTICULOSIS: ICD-10-CM

## 2025-06-21 DIAGNOSIS — R10.31 ABDOMINAL PAIN, RIGHT LOWER QUADRANT: Primary | ICD-10-CM

## 2025-06-21 LAB
ALBUMIN SERPL-MCNC: 3.4 G/DL (ref 3.5–5)
ALBUMIN/GLOB SERPL: 1.1 (ref 1.1–2.2)
ALP SERPL-CCNC: 121 U/L (ref 45–117)
ALT SERPL-CCNC: 26 U/L (ref 12–78)
ANION GAP SERPL CALC-SCNC: 5 MMOL/L (ref 2–12)
APPEARANCE UR: ABNORMAL
AST SERPL-CCNC: 16 U/L (ref 15–37)
BACTERIA URNS QL MICRO: NEGATIVE /HPF
BASOPHILS # BLD: 0.04 K/UL (ref 0–0.1)
BASOPHILS NFR BLD: 0.5 % (ref 0–1)
BILIRUB SERPL-MCNC: 0.6 MG/DL (ref 0.2–1)
BILIRUB UR QL: NEGATIVE
BUN SERPL-MCNC: 8 MG/DL (ref 6–20)
BUN/CREAT SERPL: 9 (ref 12–20)
CALCIUM SERPL-MCNC: 9 MG/DL (ref 8.5–10.1)
CHLORIDE SERPL-SCNC: 105 MMOL/L (ref 97–108)
CO2 SERPL-SCNC: 26 MMOL/L (ref 21–32)
COLOR UR: ABNORMAL
CREAT SERPL-MCNC: 0.93 MG/DL (ref 0.7–1.3)
DIFFERENTIAL METHOD BLD: ABNORMAL
EKG ATRIAL RATE: 73 BPM
EKG DIAGNOSIS: NORMAL
EKG P AXIS: 35 DEGREES
EKG P-R INTERVAL: 226 MS
EKG Q-T INTERVAL: 390 MS
EKG QRS DURATION: 84 MS
EKG QTC CALCULATION (BAZETT): 429 MS
EKG R AXIS: 30 DEGREES
EKG T AXIS: 56 DEGREES
EKG VENTRICULAR RATE: 73 BPM
EOSINOPHIL # BLD: 0.17 K/UL (ref 0–0.4)
EOSINOPHIL NFR BLD: 2.3 % (ref 0–7)
EPITH CASTS URNS QL MICRO: ABNORMAL /LPF
ERYTHROCYTE [DISTWIDTH] IN BLOOD BY AUTOMATED COUNT: 13.2 % (ref 11.5–14.5)
GLOBULIN SER CALC-MCNC: 3 G/DL (ref 2–4)
GLUCOSE SERPL-MCNC: 96 MG/DL (ref 65–100)
GLUCOSE UR STRIP.AUTO-MCNC: NEGATIVE MG/DL
HCT VFR BLD AUTO: 43.7 % (ref 36.6–50.3)
HGB BLD-MCNC: 14.7 G/DL (ref 12.1–17)
HGB UR QL STRIP: NEGATIVE
HYALINE CASTS URNS QL MICRO: ABNORMAL /LPF (ref 0–2)
IMM GRANULOCYTES # BLD AUTO: 0.07 K/UL (ref 0–0.04)
IMM GRANULOCYTES NFR BLD AUTO: 1 % (ref 0–0.5)
KETONES UR QL STRIP.AUTO: NEGATIVE MG/DL
LEUKOCYTE ESTERASE UR QL STRIP.AUTO: NEGATIVE
LIPASE SERPL-CCNC: 16 U/L (ref 13–75)
LYMPHOCYTES # BLD: 2.44 K/UL (ref 0.8–3.5)
LYMPHOCYTES NFR BLD: 33.5 % (ref 12–49)
MCH RBC QN AUTO: 30.4 PG (ref 26–34)
MCHC RBC AUTO-ENTMCNC: 33.6 G/DL (ref 30–36.5)
MCV RBC AUTO: 90.3 FL (ref 80–99)
MONOCYTES # BLD: 0.58 K/UL (ref 0–1)
MONOCYTES NFR BLD: 8 % (ref 5–13)
NEUTS SEG # BLD: 3.98 K/UL (ref 1.8–8)
NEUTS SEG NFR BLD: 54.7 % (ref 32–75)
NITRITE UR QL STRIP.AUTO: NEGATIVE
NRBC # BLD: 0 K/UL (ref 0–0.01)
NRBC BLD-RTO: 0 PER 100 WBC
PH UR STRIP: 6 (ref 5–8)
PLATELET # BLD AUTO: 146 K/UL (ref 150–400)
PMV BLD AUTO: 9.7 FL (ref 8.9–12.9)
POTASSIUM SERPL-SCNC: 3.7 MMOL/L (ref 3.5–5.1)
PROT SERPL-MCNC: 6.4 G/DL (ref 6.4–8.2)
PROT UR STRIP-MCNC: NEGATIVE MG/DL
RBC # BLD AUTO: 4.84 M/UL (ref 4.1–5.7)
RBC #/AREA URNS HPF: ABNORMAL /HPF (ref 0–5)
SODIUM SERPL-SCNC: 136 MMOL/L (ref 136–145)
SP GR UR REFRACTOMETRY: <1.005
TROPONIN I SERPL HS-MCNC: <4 NG/L (ref 0–76)
URINE CULTURE IF INDICATED: ABNORMAL
UROBILINOGEN UR QL STRIP.AUTO: 0.2 EU/DL (ref 0.2–1)
WBC # BLD AUTO: 7.3 K/UL (ref 4.1–11.1)
WBC URNS QL MICRO: ABNORMAL /HPF (ref 0–4)

## 2025-06-21 PROCEDURE — 80053 COMPREHEN METABOLIC PANEL: CPT

## 2025-06-21 PROCEDURE — 99285 EMERGENCY DEPT VISIT HI MDM: CPT

## 2025-06-21 PROCEDURE — 93005 ELECTROCARDIOGRAM TRACING: CPT | Performed by: PHYSICIAN ASSISTANT

## 2025-06-21 PROCEDURE — 96374 THER/PROPH/DIAG INJ IV PUSH: CPT

## 2025-06-21 PROCEDURE — 83690 ASSAY OF LIPASE: CPT

## 2025-06-21 PROCEDURE — 81001 URINALYSIS AUTO W/SCOPE: CPT

## 2025-06-21 PROCEDURE — 96372 THER/PROPH/DIAG INJ SC/IM: CPT

## 2025-06-21 PROCEDURE — 6360000002 HC RX W HCPCS: Performed by: PHYSICIAN ASSISTANT

## 2025-06-21 PROCEDURE — 84484 ASSAY OF TROPONIN QUANT: CPT

## 2025-06-21 PROCEDURE — 6360000004 HC RX CONTRAST MEDICATION: Performed by: RADIOLOGY

## 2025-06-21 PROCEDURE — 74177 CT ABD & PELVIS W/CONTRAST: CPT

## 2025-06-21 PROCEDURE — 85025 COMPLETE CBC W/AUTO DIFF WBC: CPT

## 2025-06-21 PROCEDURE — 96375 TX/PRO/DX INJ NEW DRUG ADDON: CPT

## 2025-06-21 PROCEDURE — 36415 COLL VENOUS BLD VENIPUNCTURE: CPT

## 2025-06-21 RX ORDER — DICYCLOMINE HYDROCHLORIDE 10 MG/ML
20 INJECTION INTRAMUSCULAR
Status: COMPLETED | OUTPATIENT
Start: 2025-06-21 | End: 2025-06-21

## 2025-06-21 RX ORDER — ONDANSETRON 4 MG/1
4 TABLET, ORALLY DISINTEGRATING ORAL 3 TIMES DAILY PRN
Qty: 21 TABLET | Refills: 0 | Status: SHIPPED | OUTPATIENT
Start: 2025-06-21

## 2025-06-21 RX ORDER — DICYCLOMINE HYDROCHLORIDE 10 MG/1
10 CAPSULE ORAL 4 TIMES DAILY PRN
Qty: 20 CAPSULE | Refills: 0 | Status: SHIPPED | OUTPATIENT
Start: 2025-06-21

## 2025-06-21 RX ORDER — IOPAMIDOL 755 MG/ML
100 INJECTION, SOLUTION INTRAVASCULAR
Status: COMPLETED | OUTPATIENT
Start: 2025-06-21 | End: 2025-06-21

## 2025-06-21 RX ORDER — FENTANYL CITRATE 50 UG/ML
25 INJECTION, SOLUTION INTRAMUSCULAR; INTRAVENOUS
Refills: 0 | Status: COMPLETED | OUTPATIENT
Start: 2025-06-21 | End: 2025-06-21

## 2025-06-21 RX ORDER — ONDANSETRON 2 MG/ML
4 INJECTION INTRAMUSCULAR; INTRAVENOUS
Status: COMPLETED | OUTPATIENT
Start: 2025-06-21 | End: 2025-06-21

## 2025-06-21 RX ADMIN — IOPAMIDOL 100 ML: 755 INJECTION, SOLUTION INTRAVENOUS at 12:56

## 2025-06-21 RX ADMIN — FENTANYL CITRATE 25 MCG: 50 INJECTION INTRAMUSCULAR; INTRAVENOUS at 12:00

## 2025-06-21 RX ADMIN — DICYCLOMINE HYDROCHLORIDE 20 MG: 10 INJECTION, SOLUTION INTRAMUSCULAR at 14:03

## 2025-06-21 RX ADMIN — ONDANSETRON 4 MG: 2 INJECTION, SOLUTION INTRAMUSCULAR; INTRAVENOUS at 11:54

## 2025-06-21 ASSESSMENT — LIFESTYLE VARIABLES
HOW OFTEN DO YOU HAVE A DRINK CONTAINING ALCOHOL: NEVER
HOW MANY STANDARD DRINKS CONTAINING ALCOHOL DO YOU HAVE ON A TYPICAL DAY: PATIENT DOES NOT DRINK

## 2025-06-21 ASSESSMENT — ENCOUNTER SYMPTOMS
NAUSEA: 1
BLOOD IN STOOL: 0
SHORTNESS OF BREATH: 0
CONSTIPATION: 0
CHEST TIGHTNESS: 0
DIARRHEA: 0
EYE PAIN: 0
WHEEZING: 0
ANAL BLEEDING: 0
RHINORRHEA: 0
BACK PAIN: 0
ABDOMINAL DISTENTION: 0
COUGH: 0
ABDOMINAL PAIN: 1
PHOTOPHOBIA: 0
VOMITING: 0
RECTAL PAIN: 0
SORE THROAT: 0

## 2025-06-21 ASSESSMENT — PAIN SCALES - GENERAL
PAINLEVEL_OUTOF10: 3
PAINLEVEL_OUTOF10: 7

## 2025-06-21 ASSESSMENT — PAIN - FUNCTIONAL ASSESSMENT
PAIN_FUNCTIONAL_ASSESSMENT: 0-10
PAIN_FUNCTIONAL_ASSESSMENT: 0-10

## 2025-06-21 ASSESSMENT — PAIN DESCRIPTION - LOCATION: LOCATION: ABDOMEN

## 2025-06-21 NOTE — ED PROVIDER NOTES
doses in 15 minutes. Do not crush or break.    PANTOPRAZOLE (PROTONIX) 20 MG TABLET    Take 1 tablet by mouth in the morning and at bedtime    PREDNISONE (DELTASONE) 10 MG TABLET    Take 5tab(50mg)x3days, 4tab(40mg)x3days, 3tab(30mg)x3days, 2tab(20mg)x2days, 1tab(10mg)x2days. #42    ZOLPIDEM (AMBIEN) 10 MG TABLET    Take by mouth nightly.       SCREENINGS               No data recorded         PHYSICAL EXAM      Vitals:    06/21/25 1145 06/21/25 1200 06/21/25 1215 06/21/25 1230   BP: (!) 120/91 132/80 126/71 134/82   Pulse:   81 71   Resp:   22 15   Temp:       TempSrc:       SpO2: 95% 92% 91% 92%   Weight:              Physical Exam  Vitals and nursing note reviewed.   Constitutional:       General: He is not in acute distress.     Appearance: Normal appearance. He is well-developed. He is obese. He is not ill-appearing, toxic-appearing or diaphoretic.   HENT:      Head: Normocephalic and atraumatic.      Right Ear: External ear normal.      Left Ear: External ear normal.      Nose: Nose normal.      Mouth/Throat:      Mouth: Mucous membranes are moist.      Pharynx: Oropharynx is clear. No pharyngeal swelling or oropharyngeal exudate.   Eyes:      General: No scleral icterus.     Extraocular Movements: Extraocular movements intact.      Conjunctiva/sclera: Conjunctivae normal.      Pupils: Pupils are equal, round, and reactive to light.   Cardiovascular:      Rate and Rhythm: Normal rate and regular rhythm.      Heart sounds: Normal heart sounds. No murmur heard.     No friction rub. No gallop.   Pulmonary:      Effort: Pulmonary effort is normal. No respiratory distress.      Breath sounds: Normal breath sounds. No stridor. No wheezing, rhonchi or rales.   Chest:      Chest wall: No tenderness.   Abdominal:      General: Abdomen is flat and protuberant. Bowel sounds are normal. There is distension.      Palpations: Abdomen is soft. There is no hepatomegaly, splenomegaly, mass or pulsatile mass.      Tenderness:  There is abdominal tenderness in the right lower quadrant. There is guarding and rebound. There is no right CVA tenderness or left CVA tenderness. Positive signs include McBurney's sign. Negative signs include Calvo's sign.      Hernia: No hernia is present.   Musculoskeletal:      Cervical back: Normal range of motion.   Skin:     General: Skin is warm and dry.      Capillary Refill: Capillary refill takes less than 2 seconds.      Coloration: Skin is not jaundiced.      Findings: No rash.   Neurological:      Mental Status: He is alert and oriented to person, place, and time.   Psychiatric:         Mood and Affect: Mood normal.          Abnormal Vitals: none  Pt's pulse ox is 97% on RA and normal.    DIAGNOSTIC RESULTS   LABS:     Recent Results (from the past 12 hours)   CBC with Auto Differential    Collection Time: 06/21/25 11:23 AM   Result Value Ref Range    WBC 7.3 4.1 - 11.1 K/uL    RBC 4.84 4.10 - 5.70 M/uL    Hemoglobin 14.7 12.1 - 17.0 g/dL    Hematocrit 43.7 36.6 - 50.3 %    MCV 90.3 80.0 - 99.0 FL    MCH 30.4 26.0 - 34.0 PG    MCHC 33.6 30.0 - 36.5 g/dL    RDW 13.2 11.5 - 14.5 %    Platelets 146 (L) 150 - 400 K/uL    MPV 9.7 8.9 - 12.9 FL    Nucleated RBCs 0.0 0  WBC    nRBC 0.00 0.00 - 0.01 K/uL    Neutrophils % 54.7 32.0 - 75.0 %    Lymphocytes % 33.5 12.0 - 49.0 %    Monocytes % 8.0 5.0 - 13.0 %    Eosinophils % 2.3 0.0 - 7.0 %    Basophils % 0.5 0.0 - 1.0 %    Immature Granulocytes % 1.0 (H) 0.0 - 0.5 %    Neutrophils Absolute 3.98 1.80 - 8.00 K/UL    Lymphocytes Absolute 2.44 0.80 - 3.50 K/UL    Monocytes Absolute 0.58 0.00 - 1.00 K/UL    Eosinophils Absolute 0.17 0.00 - 0.40 K/UL    Basophils Absolute 0.04 0.00 - 0.10 K/UL    Immature Granulocytes Absolute 0.07 (H) 0.00 - 0.04 K/UL    Differential Type AUTOMATED     Comprehensive Metabolic Panel    Collection Time: 06/21/25 11:23 AM   Result Value Ref Range    Sodium 136 136 - 145 mmol/L    Potassium 3.7 3.5 - 5.1 mmol/L    Chloride 105

## 2025-06-22 ENCOUNTER — HOSPITAL ENCOUNTER (EMERGENCY)
Facility: HOSPITAL | Age: 60
Discharge: HOME | End: 2025-06-22
Attending: EMERGENCY MEDICINE
Payer: MEDICARE

## 2025-06-22 VITALS
WEIGHT: 268.52 LBS | BODY MASS INDEX: 37.59 KG/M2 | HEIGHT: 71 IN | TEMPERATURE: 97.3 F | SYSTOLIC BLOOD PRESSURE: 133 MMHG | HEART RATE: 67 BPM | DIASTOLIC BLOOD PRESSURE: 78 MMHG | OXYGEN SATURATION: 95 % | RESPIRATION RATE: 17 BRPM

## 2025-06-22 DIAGNOSIS — R10.31 ABDOMINAL PAIN, RIGHT LOWER QUADRANT: Primary | ICD-10-CM

## 2025-06-22 LAB
ALBUMIN SERPL-MCNC: 3.3 G/DL (ref 3.5–5)
ALBUMIN/GLOB SERPL: 0.9 (ref 1.1–2.2)
ALP SERPL-CCNC: 121 U/L (ref 45–117)
ALT SERPL-CCNC: 28 U/L (ref 12–78)
ANION GAP SERPL CALC-SCNC: 4 MMOL/L (ref 2–12)
APPEARANCE UR: CLEAR
AST SERPL-CCNC: 19 U/L (ref 15–37)
BACTERIA URNS QL MICRO: NEGATIVE /HPF
BASOPHILS # BLD: 0.05 K/UL (ref 0–0.1)
BASOPHILS NFR BLD: 0.8 % (ref 0–1)
BILIRUB SERPL-MCNC: 0.4 MG/DL (ref 0.2–1)
BILIRUB UR QL: NEGATIVE
BUN SERPL-MCNC: 7 MG/DL (ref 6–20)
BUN/CREAT SERPL: 7 (ref 12–20)
CALCIUM SERPL-MCNC: 8.9 MG/DL (ref 8.5–10.1)
CHLORIDE SERPL-SCNC: 105 MMOL/L (ref 97–108)
CO2 SERPL-SCNC: 27 MMOL/L (ref 21–32)
COLOR UR: NORMAL
CREAT SERPL-MCNC: 1 MG/DL (ref 0.7–1.3)
DIFFERENTIAL METHOD BLD: ABNORMAL
EOSINOPHIL # BLD: 0.18 K/UL (ref 0–0.4)
EOSINOPHIL NFR BLD: 3 % (ref 0–7)
EPITH CASTS URNS QL MICRO: NORMAL /LPF
ERYTHROCYTE [DISTWIDTH] IN BLOOD BY AUTOMATED COUNT: 13.2 % (ref 11.5–14.5)
GLOBULIN SER CALC-MCNC: 3.5 G/DL (ref 2–4)
GLUCOSE SERPL-MCNC: 99 MG/DL (ref 65–100)
GLUCOSE UR STRIP.AUTO-MCNC: NEGATIVE MG/DL
HCT VFR BLD AUTO: 43.9 % (ref 36.6–50.3)
HGB BLD-MCNC: 14.8 G/DL (ref 12.1–17)
HGB UR QL STRIP: NEGATIVE
HYALINE CASTS URNS QL MICRO: NORMAL /LPF (ref 0–2)
IMM GRANULOCYTES # BLD AUTO: 0.05 K/UL (ref 0–0.04)
IMM GRANULOCYTES NFR BLD AUTO: 0.8 % (ref 0–0.5)
KETONES UR QL STRIP.AUTO: NEGATIVE MG/DL
LEUKOCYTE ESTERASE UR QL STRIP.AUTO: NEGATIVE
LYMPHOCYTES # BLD: 1.91 K/UL (ref 0.8–3.5)
LYMPHOCYTES NFR BLD: 31.5 % (ref 12–49)
MCH RBC QN AUTO: 31.1 PG (ref 26–34)
MCHC RBC AUTO-ENTMCNC: 33.7 G/DL (ref 30–36.5)
MCV RBC AUTO: 92.2 FL (ref 80–99)
MONOCYTES # BLD: 0.58 K/UL (ref 0–1)
MONOCYTES NFR BLD: 9.6 % (ref 5–13)
NEUTS SEG # BLD: 3.29 K/UL (ref 1.8–8)
NEUTS SEG NFR BLD: 54.3 % (ref 32–75)
NITRITE UR QL STRIP.AUTO: NEGATIVE
NRBC # BLD: 0 K/UL (ref 0–0.01)
NRBC BLD-RTO: 0 PER 100 WBC
PH UR STRIP: 6 (ref 5–8)
PLATELET # BLD AUTO: 151 K/UL (ref 150–400)
PMV BLD AUTO: 10 FL (ref 8.9–12.9)
POTASSIUM SERPL-SCNC: 3.6 MMOL/L (ref 3.5–5.1)
PROT SERPL-MCNC: 6.8 G/DL (ref 6.4–8.2)
PROT UR STRIP-MCNC: NEGATIVE MG/DL
RBC # BLD AUTO: 4.76 M/UL (ref 4.1–5.7)
RBC #/AREA URNS HPF: NORMAL /HPF (ref 0–5)
SODIUM SERPL-SCNC: 136 MMOL/L (ref 136–145)
SP GR UR REFRACTOMETRY: <1.005
URINE CULTURE IF INDICATED: NORMAL
UROBILINOGEN UR QL STRIP.AUTO: 1 EU/DL (ref 0.2–1)
WBC # BLD AUTO: 6.1 K/UL (ref 4.1–11.1)
WBC URNS QL MICRO: NORMAL /HPF (ref 0–4)

## 2025-06-22 PROCEDURE — 80053 COMPREHEN METABOLIC PANEL: CPT

## 2025-06-22 PROCEDURE — 81001 URINALYSIS AUTO W/SCOPE: CPT

## 2025-06-22 PROCEDURE — 36415 COLL VENOUS BLD VENIPUNCTURE: CPT

## 2025-06-22 PROCEDURE — 85025 COMPLETE CBC W/AUTO DIFF WBC: CPT

## 2025-06-22 PROCEDURE — 6360000002 HC RX W HCPCS: Performed by: EMERGENCY MEDICINE

## 2025-06-22 PROCEDURE — 99284 EMERGENCY DEPT VISIT MOD MDM: CPT

## 2025-06-22 PROCEDURE — 96374 THER/PROPH/DIAG INJ IV PUSH: CPT

## 2025-06-22 RX ORDER — KETOROLAC TROMETHAMINE 30 MG/ML
15 INJECTION, SOLUTION INTRAMUSCULAR; INTRAVENOUS ONCE
Status: COMPLETED | OUTPATIENT
Start: 2025-06-22 | End: 2025-06-22

## 2025-06-22 RX ORDER — KETOROLAC TROMETHAMINE 10 MG/1
10 TABLET, FILM COATED ORAL EVERY 6 HOURS PRN
Qty: 20 TABLET | Refills: 0 | Status: SHIPPED | OUTPATIENT
Start: 2025-06-22

## 2025-06-22 RX ADMIN — KETOROLAC TROMETHAMINE 15 MG: 30 INJECTION, SOLUTION INTRAMUSCULAR at 09:38

## 2025-06-22 ASSESSMENT — PAIN DESCRIPTION - ORIENTATION
ORIENTATION: RIGHT
ORIENTATION: RIGHT;LOWER
ORIENTATION: RIGHT;UPPER

## 2025-06-22 ASSESSMENT — PAIN DESCRIPTION - ONSET: ONSET: ON-GOING

## 2025-06-22 ASSESSMENT — PAIN - FUNCTIONAL ASSESSMENT
PAIN_FUNCTIONAL_ASSESSMENT: ACTIVITIES ARE NOT PREVENTED
PAIN_FUNCTIONAL_ASSESSMENT: PREVENTS OR INTERFERES SOME ACTIVE ACTIVITIES AND ADLS

## 2025-06-22 ASSESSMENT — PAIN DESCRIPTION - DESCRIPTORS
DESCRIPTORS: SHARP
DESCRIPTORS: ACHING
DESCRIPTORS: ACHING

## 2025-06-22 ASSESSMENT — PAIN DESCRIPTION - LOCATION
LOCATION: ABDOMEN

## 2025-06-22 ASSESSMENT — PAIN DESCRIPTION - PAIN TYPE
TYPE: ACUTE PAIN
TYPE: ACUTE PAIN

## 2025-06-22 ASSESSMENT — PAIN SCALES - GENERAL
PAINLEVEL_OUTOF10: 5
PAINLEVEL_OUTOF10: 8
PAINLEVEL_OUTOF10: 8

## 2025-06-22 ASSESSMENT — PAIN DESCRIPTION - FREQUENCY: FREQUENCY: CONTINUOUS

## 2025-06-22 NOTE — ED PROVIDER NOTES
[facility] EMERGENCY DEPT  EMERGENCY DEPARTMENT HISTORY AND PHYSICAL EXAM      Date of evaluation: 2025  Patient Name: Alexandr Bush  Birthdate 1965  MRN: [id number]  ED Provider: Jenniffer Garvey DO   Note Started: 9:27 AM EDT 25    HISTORY OF PRESENT ILLNESS     Chief Complaint   Patient presents with    Abdominal Pain     Seen here last night for same pain has gotten worse       History Provided By: Patient, only and spouse     HPI: Alexandr Bush is a 60 y.o. male who presents to ED with RLQ abdominal pain.     PAST MEDICAL HISTORY   Past Medical History:  Past Medical History:   Diagnosis Date    At risk for sleep apnea     Cholelithiasis 2011    COPD (chronic obstructive pulmonary disease) (HCC)     High cholesterol     Hypertension     Left sided numbness 2022    Other ill-defined conditions(799.89)     hypercholesterolemia    Paresthesia 2022    Psychiatric disorder     anxiety    Seasonal allergies     Status post laparoscopic cholecystectomy 11       Past Surgical History:  Past Surgical History:   Procedure Laterality Date    CHOLECYSTECTOMY      COLONOSCOPY N/A 3/25/2024    COLONOSCOPY, EGD performed by Rony Montes MD at [facility] ENDOSCOPY    LAP,CHOLECYSTECTOMY  11    ORTHOPEDIC SURGERY      back srgery, arm surgery    UPPER GASTROINTESTINAL ENDOSCOPY N/A 3/25/2024    ESOPHAGOGASTRODUODENOSCOPY performed by Rony Montes MD at [facility] ENDOSCOPY       Family History:  Family History   Problem Relation Age of Onset    Headache Mother     Heart Disease Mother     COPD Father     Headache Sister     Headache Brother     Heart Disease Maternal Grandfather     Heart Disease Paternal Grandmother        Social History:  Social History     Tobacco Use    Smoking status: Former     Current packs/day: 0.00     Types: Cigarettes     Quit date: 2020     Years since quittin.1    Smokeless tobacco: Former     Quit date: 2001   Vaping Use    Vaping status: Never Used

## 2025-06-22 NOTE — DISCHARGE INSTRUCTIONS
You were seen again in the emergency department today for recurrent right-sided abdominal pain.  Yesterday you had had a full workup including lab work and a CT scan.  We repeated some blood work, based on your exam and your blood work findings, we did not feel we needed to do any further imaging today.  Your blood work showed normal electrolytes, no sign for infection, normal kidney and liver function.  We treated you with some anti-inflammatory pain medication which seem to help your pain more.  We suspect that part of your pain might be musculoskeletal in nature, so organ to send you home with the same anti-inflammatory medication for you to take as needed.  Please do not take any other NSAIDs such as ibuprofen or Aleve while you are on this pain medication as they are in the same family and can increase the risk of bleeding.  You can continue to take the nausea medicine you are prescribed as needed.  Please call your primary care doctor to make a follow-up appointment to reevaluate your symptoms.

## 2025-06-22 NOTE — ED NOTES
Bedside and Verbal shift change report given to Tobias RN (oncoming nurse) by Chelsea RN (offgoing nurse). Report included the following information Nurse Handoff Report, ED Encounter Summary, ED SBAR, Adult Overview, Intake/Output, MAR, and Recent Results.

## 2025-06-25 ENCOUNTER — APPOINTMENT (OUTPATIENT)
Facility: HOSPITAL | Age: 60
End: 2025-06-25
Payer: MEDICARE

## 2025-06-25 ENCOUNTER — HOSPITAL ENCOUNTER (EMERGENCY)
Facility: HOSPITAL | Age: 60
Discharge: HOME | End: 2025-06-25
Attending: EMERGENCY MEDICINE
Payer: MEDICARE

## 2025-06-25 VITALS
TEMPERATURE: 97.8 F | HEIGHT: 71 IN | BODY MASS INDEX: 38.06 KG/M2 | RESPIRATION RATE: 17 BRPM | OXYGEN SATURATION: 96 % | DIASTOLIC BLOOD PRESSURE: 63 MMHG | SYSTOLIC BLOOD PRESSURE: 118 MMHG | HEART RATE: 64 BPM | WEIGHT: 271.83 LBS

## 2025-06-25 DIAGNOSIS — R10.33 RECURRENT PERIUMBILICAL ABDOMINAL PAIN: Primary | ICD-10-CM

## 2025-06-25 DIAGNOSIS — R20.3 HYPERESTHESIA: ICD-10-CM

## 2025-06-25 LAB
ALBUMIN SERPL-MCNC: 3.3 G/DL (ref 3.5–5)
ALBUMIN/GLOB SERPL: 1 (ref 1.1–2.2)
ALP SERPL-CCNC: 117 U/L (ref 45–117)
ALT SERPL-CCNC: 28 U/L (ref 12–78)
ANION GAP SERPL CALC-SCNC: 4 MMOL/L (ref 2–12)
APPEARANCE UR: ABNORMAL
AST SERPL-CCNC: 19 U/L (ref 15–37)
BACTERIA URNS QL MICRO: NEGATIVE /HPF
BASOPHILS # BLD: 0.05 K/UL (ref 0–0.1)
BASOPHILS NFR BLD: 0.9 % (ref 0–1)
BILIRUB SERPL-MCNC: 0.5 MG/DL (ref 0.2–1)
BILIRUB UR QL: NEGATIVE
BUN SERPL-MCNC: 7 MG/DL (ref 6–20)
BUN/CREAT SERPL: 7 (ref 12–20)
CALCIUM SERPL-MCNC: 8.6 MG/DL (ref 8.5–10.1)
CHLORIDE SERPL-SCNC: 107 MMOL/L (ref 97–108)
CO2 SERPL-SCNC: 27 MMOL/L (ref 21–32)
COLOR UR: ABNORMAL
COMMENT:: NORMAL
CREAT SERPL-MCNC: 0.98 MG/DL (ref 0.7–1.3)
DIFFERENTIAL METHOD BLD: ABNORMAL
EKG ATRIAL RATE: 67 BPM
EKG DIAGNOSIS: NORMAL
EKG P AXIS: 34 DEGREES
EKG P-R INTERVAL: 202 MS
EKG Q-T INTERVAL: 398 MS
EKG QRS DURATION: 82 MS
EKG QTC CALCULATION (BAZETT): 420 MS
EKG R AXIS: 26 DEGREES
EKG T AXIS: 59 DEGREES
EKG VENTRICULAR RATE: 67 BPM
EOSINOPHIL # BLD: 0.21 K/UL (ref 0–0.4)
EOSINOPHIL NFR BLD: 3.6 % (ref 0–7)
EPITH CASTS URNS QL MICRO: ABNORMAL /LPF
ERYTHROCYTE [DISTWIDTH] IN BLOOD BY AUTOMATED COUNT: 13.3 % (ref 11.5–14.5)
GLOBULIN SER CALC-MCNC: 3.4 G/DL (ref 2–4)
GLUCOSE SERPL-MCNC: 99 MG/DL (ref 65–100)
GLUCOSE UR STRIP.AUTO-MCNC: NEGATIVE MG/DL
HCT VFR BLD AUTO: 43.8 % (ref 36.6–50.3)
HGB BLD-MCNC: 14.5 G/DL (ref 12.1–17)
HGB UR QL STRIP: NEGATIVE
HYALINE CASTS URNS QL MICRO: ABNORMAL /LPF (ref 0–2)
IMM GRANULOCYTES # BLD AUTO: 0.07 K/UL (ref 0–0.04)
IMM GRANULOCYTES NFR BLD AUTO: 1.2 % (ref 0–0.5)
KETONES UR QL STRIP.AUTO: NEGATIVE MG/DL
LEUKOCYTE ESTERASE UR QL STRIP.AUTO: NEGATIVE
LIPASE SERPL-CCNC: 20 U/L (ref 13–75)
LYMPHOCYTES # BLD: 2.07 K/UL (ref 0.8–3.5)
LYMPHOCYTES NFR BLD: 35.9 % (ref 12–49)
MCH RBC QN AUTO: 30.2 PG (ref 26–34)
MCHC RBC AUTO-ENTMCNC: 33.1 G/DL (ref 30–36.5)
MCV RBC AUTO: 91.3 FL (ref 80–99)
MONOCYTES # BLD: 0.57 K/UL (ref 0–1)
MONOCYTES NFR BLD: 9.9 % (ref 5–13)
NEUTS SEG # BLD: 2.79 K/UL (ref 1.8–8)
NEUTS SEG NFR BLD: 48.5 % (ref 32–75)
NITRITE UR QL STRIP.AUTO: NEGATIVE
NRBC # BLD: 0 K/UL (ref 0–0.01)
NRBC BLD-RTO: 0 PER 100 WBC
PH UR STRIP: 6 (ref 5–8)
PLATELET # BLD AUTO: 170 K/UL (ref 150–400)
PMV BLD AUTO: 9.6 FL (ref 8.9–12.9)
POTASSIUM SERPL-SCNC: 4.1 MMOL/L (ref 3.5–5.1)
PROT SERPL-MCNC: 6.7 G/DL (ref 6.4–8.2)
PROT UR STRIP-MCNC: NEGATIVE MG/DL
RBC # BLD AUTO: 4.8 M/UL (ref 4.1–5.7)
RBC #/AREA URNS HPF: ABNORMAL /HPF (ref 0–5)
SODIUM SERPL-SCNC: 138 MMOL/L (ref 136–145)
SP GR UR REFRACTOMETRY: 1
SPECIMEN HOLD: NORMAL
URINE CULTURE IF INDICATED: ABNORMAL
UROBILINOGEN UR QL STRIP.AUTO: 1 EU/DL (ref 0.2–1)
WBC # BLD AUTO: 5.8 K/UL (ref 4.1–11.1)
WBC URNS QL MICRO: ABNORMAL /HPF (ref 0–4)

## 2025-06-25 PROCEDURE — 85025 COMPLETE CBC W/AUTO DIFF WBC: CPT

## 2025-06-25 PROCEDURE — 96374 THER/PROPH/DIAG INJ IV PUSH: CPT

## 2025-06-25 PROCEDURE — 6360000004 HC RX CONTRAST MEDICATION: Performed by: RADIOLOGY

## 2025-06-25 PROCEDURE — 96375 TX/PRO/DX INJ NEW DRUG ADDON: CPT

## 2025-06-25 PROCEDURE — 99285 EMERGENCY DEPT VISIT HI MDM: CPT

## 2025-06-25 PROCEDURE — 74177 CT ABD & PELVIS W/CONTRAST: CPT

## 2025-06-25 PROCEDURE — 81001 URINALYSIS AUTO W/SCOPE: CPT

## 2025-06-25 PROCEDURE — 83690 ASSAY OF LIPASE: CPT

## 2025-06-25 PROCEDURE — 93005 ELECTROCARDIOGRAM TRACING: CPT

## 2025-06-25 PROCEDURE — 36415 COLL VENOUS BLD VENIPUNCTURE: CPT

## 2025-06-25 PROCEDURE — 80053 COMPREHEN METABOLIC PANEL: CPT

## 2025-06-25 PROCEDURE — 6360000002 HC RX W HCPCS: Performed by: EMERGENCY MEDICINE

## 2025-06-25 PROCEDURE — 6370000000 HC RX 637 (ALT 250 FOR IP): Performed by: EMERGENCY MEDICINE

## 2025-06-25 RX ORDER — KETOROLAC TROMETHAMINE 30 MG/ML
15 INJECTION, SOLUTION INTRAMUSCULAR; INTRAVENOUS
Status: COMPLETED | OUTPATIENT
Start: 2025-06-25 | End: 2025-06-25

## 2025-06-25 RX ORDER — POLYETHYLENE GLYCOL 3350 17 G/17G
17 POWDER, FOR SOLUTION ORAL DAILY
Qty: 1530 G | Refills: 1 | Status: SHIPPED | OUTPATIENT
Start: 2025-06-25 | End: 2025-07-25

## 2025-06-25 RX ORDER — OXYCODONE AND ACETAMINOPHEN 5; 325 MG/1; MG/1
2 TABLET ORAL
Refills: 0 | Status: COMPLETED | OUTPATIENT
Start: 2025-06-25 | End: 2025-06-25

## 2025-06-25 RX ORDER — ONDANSETRON 2 MG/ML
4 INJECTION INTRAMUSCULAR; INTRAVENOUS
Status: COMPLETED | OUTPATIENT
Start: 2025-06-25 | End: 2025-06-25

## 2025-06-25 RX ORDER — DIAZEPAM 10 MG/2ML
2 INJECTION, SOLUTION INTRAMUSCULAR; INTRAVENOUS ONCE
Status: COMPLETED | OUTPATIENT
Start: 2025-06-25 | End: 2025-06-25

## 2025-06-25 RX ORDER — OXYCODONE AND ACETAMINOPHEN 5; 325 MG/1; MG/1
1 TABLET ORAL EVERY 6 HOURS PRN
Qty: 12 TABLET | Refills: 0 | Status: SHIPPED | OUTPATIENT
Start: 2025-06-25 | End: 2025-06-28

## 2025-06-25 RX ORDER — LIDOCAINE 4 G/G
1 PATCH TOPICAL
Status: DISCONTINUED | OUTPATIENT
Start: 2025-06-25 | End: 2025-06-25 | Stop reason: HOSPADM

## 2025-06-25 RX ORDER — FENTANYL CITRATE 50 UG/ML
50 INJECTION, SOLUTION INTRAMUSCULAR; INTRAVENOUS
Refills: 0 | Status: DISCONTINUED | OUTPATIENT
Start: 2025-06-25 | End: 2025-06-25

## 2025-06-25 RX ORDER — DICYCLOMINE HYDROCHLORIDE 10 MG/1
20 CAPSULE ORAL EVERY 6 HOURS PRN
Qty: 20 CAPSULE | Refills: 3 | Status: SHIPPED | OUTPATIENT
Start: 2025-06-25

## 2025-06-25 RX ORDER — IOPAMIDOL 755 MG/ML
100 INJECTION, SOLUTION INTRAVASCULAR
Status: COMPLETED | OUTPATIENT
Start: 2025-06-25 | End: 2025-06-25

## 2025-06-25 RX ADMIN — IOPAMIDOL 100 ML: 755 INJECTION, SOLUTION INTRAVENOUS at 05:12

## 2025-06-25 RX ADMIN — KETOROLAC TROMETHAMINE 15 MG: 30 INJECTION, SOLUTION INTRAMUSCULAR at 04:58

## 2025-06-25 RX ADMIN — OXYCODONE AND ACETAMINOPHEN 2 TABLET: 5; 325 TABLET ORAL at 06:48

## 2025-06-25 RX ADMIN — ONDANSETRON 4 MG: 2 INJECTION, SOLUTION INTRAMUSCULAR; INTRAVENOUS at 04:58

## 2025-06-25 RX ADMIN — DIAZEPAM 2 MG: 5 INJECTION, SOLUTION INTRAMUSCULAR; INTRAVENOUS at 05:48

## 2025-06-25 ASSESSMENT — PAIN SCALES - GENERAL
PAINLEVEL_OUTOF10: 10
PAINLEVEL_OUTOF10: 10
PAINLEVEL_OUTOF10: 7
PAINLEVEL_OUTOF10: 10
PAINLEVEL_OUTOF10: 5

## 2025-06-25 ASSESSMENT — ENCOUNTER SYMPTOMS
CONSTIPATION: 1
NAUSEA: 0
ABDOMINAL PAIN: 1
VOMITING: 0
SHORTNESS OF BREATH: 0
DIARRHEA: 0

## 2025-06-25 ASSESSMENT — PAIN DESCRIPTION - ORIENTATION
ORIENTATION: RIGHT;LOWER
ORIENTATION: RIGHT
ORIENTATION: RIGHT;LOWER

## 2025-06-25 ASSESSMENT — PAIN DESCRIPTION - DESCRIPTORS
DESCRIPTORS: SHARP

## 2025-06-25 ASSESSMENT — PAIN DESCRIPTION - LOCATION
LOCATION: ABDOMEN;BACK;NECK
LOCATION: ABDOMEN;BACK;NECK

## 2025-06-25 NOTE — DISCHARGE INSTRUCTIONS
It was a pleasure taking care of you in our Emergency Department today.  We know that when you come to [facility], you are entrusting us with your health, comfort, and safety.  Our physicians and nurses honor that trust, and truly appreciate the opportunity to care for you and your loved ones.      We also value your feedback.  If you receive a survey about your Emergency Department experience today, please fill it out.  We care about our patients' feedback, and we listen to what you have to say.  Thank you!      Dr. Silvia Damian MD.

## 2025-06-25 NOTE — ED NOTES
Patient discharged from the ED by MD Damian. Diagnosis, medications, precautions and follow-ups were reviewed with the patient/family. Questions were asked and answered prior to departure. Patient departed the ED via ambulatory and was accompanied by self.

## 2025-06-25 NOTE — ED PROVIDER NOTES
EMERGENCY DEPARTMENT HISTORY AND PHYSICAL EXAM     ----------------------------------------------------------------------------  Please note that this dictation was completed with [employer], the computer voice recognition software.  Quite often unanticipated grammatical, syntax, homophones, and other interpretive errors are inadvertently transcribed by the computer software.  Please disregard these errors.  Please excuse any errors that have escaped final proofreading  ----------------------------------------------------------------------------      Date: 6/25/2025  Patient Name: Alexandr Bush      HISTORY OF PRESENT ILLNESS     Chief Complaint   Patient presents with   • Abdominal Pain     Pt ambulatory to triage with cc of abd pain. Pt reports 10/10 R sided abdominal pain radiating to back and up his neck x1 week.        History obtainted from:  Patient    Other independent source of history: Family    HPI: Alexandr Bush is a 60 y.o. male, with significant pmhx of hypertension, anxiety, COPD, who presents via private vehicle to the ED with c/o recurrent right-sided abdominal pain.  Reports he has been been seen here for the same without significant findings.  Had been taking Toradol with some relief but no longer finding relief.  Denies nausea or vomiting but notes feeling constipated last night.  Denies dysuria, hematuria, fever.  Most of the pain is radiating up towards his back and down towards his hip.      PCP: Christy Taylor APRN - NP    Allergy List:   Allergies   Allergen Reactions   • Codeine Nausea And Vomiting   • Hydromorphone Nausea And Vomiting     Caused N&V when given in the hospital   • Morphine Rash     Pt says itching not a rash 10/31 [place]         CURRENT MEDICATIONS      Previous Medications    ACETAMINOPHEN (TYLENOL) 500 MG TABLET    Take 1 tablet by mouth 4 times daily as needed for Pain    ALBUTEROL SULFATE HFA (PROVENTIL;VENTOLIN;PROAIR) 108 (90 BASE) MCG/ACT INHALER    Inhale 4 puffs into the

## 2025-06-28 ENCOUNTER — HOSPITAL ENCOUNTER (EMERGENCY)
Facility: HOSPITAL | Age: 60
Discharge: HOME | End: 2025-06-28
Attending: EMERGENCY MEDICINE
Payer: MEDICARE

## 2025-06-28 VITALS
BODY MASS INDEX: 37.8 KG/M2 | OXYGEN SATURATION: 97 % | HEART RATE: 56 BPM | RESPIRATION RATE: 17 BRPM | DIASTOLIC BLOOD PRESSURE: 61 MMHG | HEIGHT: 71 IN | TEMPERATURE: 97.7 F | WEIGHT: 270 LBS | SYSTOLIC BLOOD PRESSURE: 142 MMHG

## 2025-06-28 DIAGNOSIS — R10.9 ACUTE RIGHT FLANK PAIN: ICD-10-CM

## 2025-06-28 DIAGNOSIS — R10.9 RIGHT SIDED ABDOMINAL PAIN: Primary | ICD-10-CM

## 2025-06-28 LAB
ALBUMIN SERPL-MCNC: 3 G/DL (ref 3.5–5)
ALBUMIN/GLOB SERPL: 0.9 (ref 1.1–2.2)
ALP SERPL-CCNC: 112 U/L (ref 45–117)
ALT SERPL-CCNC: 25 U/L (ref 12–78)
ANION GAP SERPL CALC-SCNC: 4 MMOL/L (ref 2–12)
APPEARANCE UR: CLEAR
AST SERPL-CCNC: 18 U/L (ref 15–37)
BACTERIA URNS QL MICRO: NEGATIVE /HPF
BASOPHILS # BLD: 0.05 K/UL (ref 0–0.1)
BASOPHILS NFR BLD: 0.8 % (ref 0–1)
BILIRUB SERPL-MCNC: 0.3 MG/DL (ref 0.2–1)
BILIRUB UR QL: NEGATIVE
BUN SERPL-MCNC: 9 MG/DL (ref 6–20)
BUN/CREAT SERPL: 9 (ref 12–20)
CALCIUM SERPL-MCNC: 8.8 MG/DL (ref 8.5–10.1)
CHLORIDE SERPL-SCNC: 107 MMOL/L (ref 97–108)
CO2 SERPL-SCNC: 25 MMOL/L (ref 21–32)
COLOR UR: NORMAL
CREAT SERPL-MCNC: 1.05 MG/DL (ref 0.7–1.3)
DIFFERENTIAL METHOD BLD: ABNORMAL
EOSINOPHIL # BLD: 0.23 K/UL (ref 0–0.4)
EOSINOPHIL NFR BLD: 3.5 % (ref 0–7)
EPITH CASTS URNS QL MICRO: NORMAL /LPF
ERYTHROCYTE [DISTWIDTH] IN BLOOD BY AUTOMATED COUNT: 13.2 % (ref 11.5–14.5)
GLOBULIN SER CALC-MCNC: 3.4 G/DL (ref 2–4)
GLUCOSE SERPL-MCNC: 95 MG/DL (ref 65–100)
GLUCOSE UR STRIP.AUTO-MCNC: NEGATIVE MG/DL
HCT VFR BLD AUTO: 42.9 % (ref 36.6–50.3)
HGB BLD-MCNC: 14.4 G/DL (ref 12.1–17)
HGB UR QL STRIP: NEGATIVE
HYALINE CASTS URNS QL MICRO: NORMAL /LPF (ref 0–2)
IMM GRANULOCYTES # BLD AUTO: 0.06 K/UL (ref 0–0.04)
IMM GRANULOCYTES NFR BLD AUTO: 0.9 % (ref 0–0.5)
KETONES UR QL STRIP.AUTO: NEGATIVE MG/DL
LEUKOCYTE ESTERASE UR QL STRIP.AUTO: NEGATIVE
LIPASE SERPL-CCNC: 21 U/L (ref 13–75)
LYMPHOCYTES # BLD: 2.28 K/UL (ref 0.8–3.5)
LYMPHOCYTES NFR BLD: 34.3 % (ref 12–49)
MCH RBC QN AUTO: 30.3 PG (ref 26–34)
MCHC RBC AUTO-ENTMCNC: 33.6 G/DL (ref 30–36.5)
MCV RBC AUTO: 90.1 FL (ref 80–99)
MONOCYTES # BLD: 0.57 K/UL (ref 0–1)
MONOCYTES NFR BLD: 8.6 % (ref 5–13)
NEUTS SEG # BLD: 3.45 K/UL (ref 1.8–8)
NEUTS SEG NFR BLD: 51.9 % (ref 32–75)
NITRITE UR QL STRIP.AUTO: NEGATIVE
NRBC # BLD: 0 K/UL (ref 0–0.01)
NRBC BLD-RTO: 0 PER 100 WBC
PH UR STRIP: 6.5 (ref 5–8)
PLATELET # BLD AUTO: 156 K/UL (ref 150–400)
PMV BLD AUTO: 9.6 FL (ref 8.9–12.9)
POTASSIUM SERPL-SCNC: 4.1 MMOL/L (ref 3.5–5.1)
PROT SERPL-MCNC: 6.4 G/DL (ref 6.4–8.2)
PROT UR STRIP-MCNC: NEGATIVE MG/DL
RBC # BLD AUTO: 4.76 M/UL (ref 4.1–5.7)
RBC #/AREA URNS HPF: NORMAL /HPF (ref 0–5)
SODIUM SERPL-SCNC: 136 MMOL/L (ref 136–145)
SP GR UR REFRACTOMETRY: 1.01
URINE CULTURE IF INDICATED: NORMAL
UROBILINOGEN UR QL STRIP.AUTO: 1 EU/DL (ref 0.2–1)
WBC # BLD AUTO: 6.6 K/UL (ref 4.1–11.1)
WBC URNS QL MICRO: NORMAL /HPF (ref 0–4)

## 2025-06-28 PROCEDURE — 99284 EMERGENCY DEPT VISIT MOD MDM: CPT

## 2025-06-28 PROCEDURE — 80053 COMPREHEN METABOLIC PANEL: CPT

## 2025-06-28 PROCEDURE — 96374 THER/PROPH/DIAG INJ IV PUSH: CPT

## 2025-06-28 PROCEDURE — 85025 COMPLETE CBC W/AUTO DIFF WBC: CPT

## 2025-06-28 PROCEDURE — 81001 URINALYSIS AUTO W/SCOPE: CPT

## 2025-06-28 PROCEDURE — 6360000002 HC RX W HCPCS: Performed by: EMERGENCY MEDICINE

## 2025-06-28 PROCEDURE — 36415 COLL VENOUS BLD VENIPUNCTURE: CPT

## 2025-06-28 PROCEDURE — 83690 ASSAY OF LIPASE: CPT

## 2025-06-28 PROCEDURE — 6370000000 HC RX 637 (ALT 250 FOR IP): Performed by: EMERGENCY MEDICINE

## 2025-06-28 PROCEDURE — 96375 TX/PRO/DX INJ NEW DRUG ADDON: CPT

## 2025-06-28 RX ORDER — METHOCARBAMOL 750 MG/1
1500 TABLET, FILM COATED ORAL ONCE
Status: COMPLETED | OUTPATIENT
Start: 2025-06-28 | End: 2025-06-28

## 2025-06-28 RX ORDER — ACETAMINOPHEN 500 MG
1000 TABLET ORAL EVERY 6 HOURS PRN
Qty: 120 TABLET | Refills: 0 | Status: SHIPPED | OUTPATIENT
Start: 2025-06-28

## 2025-06-28 RX ORDER — METHOCARBAMOL 750 MG/1
750 TABLET, FILM COATED ORAL 4 TIMES DAILY PRN
Qty: 30 TABLET | Refills: 0 | Status: SHIPPED | OUTPATIENT
Start: 2025-06-28 | End: 2025-07-08

## 2025-06-28 RX ORDER — LIDOCAINE 4 G/G
1 PATCH TOPICAL DAILY
Qty: 30 PATCH | Refills: 0 | Status: SHIPPED | OUTPATIENT
Start: 2025-06-28 | End: 2025-07-28

## 2025-06-28 RX ORDER — ACETAMINOPHEN 500 MG
1000 TABLET ORAL
Status: COMPLETED | OUTPATIENT
Start: 2025-06-28 | End: 2025-06-28

## 2025-06-28 RX ORDER — METHYLPREDNISOLONE 4 MG/1
TABLET ORAL
Qty: 21 TABLET | Refills: 0 | Status: SHIPPED | OUTPATIENT
Start: 2025-06-28 | End: 2025-07-04

## 2025-06-28 RX ORDER — MORPHINE SULFATE 4 MG/ML
4 INJECTION, SOLUTION INTRAMUSCULAR; INTRAVENOUS
Status: COMPLETED | OUTPATIENT
Start: 2025-06-28 | End: 2025-06-28

## 2025-06-28 RX ORDER — KETOROLAC TROMETHAMINE 30 MG/ML
15 INJECTION, SOLUTION INTRAMUSCULAR; INTRAVENOUS ONCE
Status: COMPLETED | OUTPATIENT
Start: 2025-06-28 | End: 2025-06-28

## 2025-06-28 RX ORDER — LIDOCAINE 4 G/G
1 PATCH TOPICAL
Status: DISCONTINUED | OUTPATIENT
Start: 2025-06-28 | End: 2025-06-28 | Stop reason: HOSPADM

## 2025-06-28 RX ORDER — DEXAMETHASONE SODIUM PHOSPHATE 10 MG/ML
10 INJECTION, SOLUTION INTRAMUSCULAR; INTRAVENOUS ONCE
Status: COMPLETED | OUTPATIENT
Start: 2025-06-28 | End: 2025-06-28

## 2025-06-28 RX ADMIN — KETOROLAC TROMETHAMINE 15 MG: 30 INJECTION, SOLUTION INTRAMUSCULAR at 05:48

## 2025-06-28 RX ADMIN — MORPHINE SULFATE 4 MG: 4 INJECTION, SOLUTION INTRAMUSCULAR; INTRAVENOUS at 05:49

## 2025-06-28 RX ADMIN — METHOCARBAMOL TABLETS 1500 MG: 750 TABLET, COATED ORAL at 05:49

## 2025-06-28 RX ADMIN — DEXAMETHASONE SODIUM PHOSPHATE 10 MG: 10 INJECTION, SOLUTION INTRAMUSCULAR; INTRAVENOUS at 05:48

## 2025-06-28 RX ADMIN — ACETAMINOPHEN 1000 MG: 500 TABLET ORAL at 05:49

## 2025-06-28 ASSESSMENT — PAIN DESCRIPTION - ONSET
ONSET: ON-GOING
ONSET: ON-GOING

## 2025-06-28 ASSESSMENT — PAIN DESCRIPTION - LOCATION
LOCATION: ABDOMEN;BACK
LOCATION: ABDOMEN;BACK
LOCATION: ABDOMEN

## 2025-06-28 ASSESSMENT — PAIN DESCRIPTION - DESCRIPTORS
DESCRIPTORS: ACHING
DESCRIPTORS: ACHING
DESCRIPTORS: CRAMPING

## 2025-06-28 ASSESSMENT — PAIN DESCRIPTION - ORIENTATION
ORIENTATION: RIGHT;LEFT
ORIENTATION: RIGHT;LOWER
ORIENTATION: RIGHT;LOWER

## 2025-06-28 ASSESSMENT — PAIN SCALES - GENERAL
PAINLEVEL_OUTOF10: 5
PAINLEVEL_OUTOF10: 10
PAINLEVEL_OUTOF10: 10

## 2025-06-28 ASSESSMENT — PAIN DESCRIPTION - PAIN TYPE
TYPE: ACUTE PAIN
TYPE: ACUTE PAIN

## 2025-06-28 ASSESSMENT — PAIN DESCRIPTION - FREQUENCY
FREQUENCY: INTERMITTENT
FREQUENCY: CONTINUOUS

## 2025-06-28 ASSESSMENT — PAIN - FUNCTIONAL ASSESSMENT: PAIN_FUNCTIONAL_ASSESSMENT: ACTIVITIES ARE NOT PREVENTED

## 2025-06-28 NOTE — ED PROVIDER NOTES
[facility] EMERGENCY DEPARTMENT  EMERGENCY DEPARTMENT ENCOUNTER       Pt Name: Alexandr Bush  MRN: [id number]  Birthdate 1965  Date of evaluation: 6/28/2025  Provider: Patricio Morrissey MD   PCP: Christy Taylor APRN - NP  Note Started: 5:42 AM 6/28/25     CHIEF COMPLAINT       Chief Complaint   Patient presents with    Abdominal Pain     Pt arrived to ED from home with RLQ that has worsened, seen here on 6/25/25 and had neg CT.         HISTORY OF PRESENT ILLNESS: 1 or more elements      History From: Patient, History limited by: No limitations     Alexandr Bush is a 60 y.o. male who presents with chief complaint of right-sided abdominal pain.  Symptoms have been constant over the course of the past month.  No aggravating relieving factors.  Pain moves from the right side of the abdomen into the back and up into the neck.  He has been taking Percocet and Toradol without relief of symptoms.  This is now his fourth ED visit in the past 1 week for same symptoms and he has had 2 CT scans with unremarkable workup.  He has an upcoming GI appointment on 7/3.  Denies any postprandial pain, constipation urinary symptoms, nausea, vomiting, or fevers.  He notes that pain is significantly worsened with any movement such as twisting, turning.         Nursing Notes were all reviewed and agreed with or any disagreements were addressed in the HPI.     REVIEW OF SYSTEMS        Positives and Pertinent negatives as per HPI.    PAST HISTORY     Past Medical History:  Past Medical History:   Diagnosis Date    At risk for sleep apnea     Cholelithiasis 8/22/2011    COPD (chronic obstructive pulmonary disease) (HCC)     High cholesterol     Hypertension     Left sided numbness 5/5/2022    Other ill-defined conditions(799.89)     hypercholesterolemia    Paresthesia 5/4/2022    Psychiatric disorder     anxiety    Seasonal allergies     Status post laparoscopic cholecystectomy 8/25/11       Past Surgical History:  Past Surgical History:

## 2025-06-28 NOTE — DISCHARGE INSTRUCTIONS
You were evaluated in the emergency department for abdominal pain and right flank pain.  Your examination was reassuring as was your work-up including lab work.  I highly suspect this is coming from your back, possibly pinched nerve or muscle spasm, please take the new medications in addition to the medications you are already taking.  It will be important for you to follow-up with your GI doctor, please give this upcoming appointment, also please make an appointment to your spine doctor.  If you develop worsening symptoms such as worsening pain, nausea, vomiting, or fevers, please return to the emergency department immediately.

## 2025-07-03 ENCOUNTER — TRANSCRIBE ORDERS (OUTPATIENT)
Facility: HOSPITAL | Age: 60
End: 2025-07-03

## 2025-07-03 DIAGNOSIS — R10.11 ABDOMINAL PAIN, RIGHT UPPER QUADRANT: ICD-10-CM

## 2025-07-03 DIAGNOSIS — R19.4 CHANGE IN BOWEL HABITS: Primary | ICD-10-CM

## 2025-07-03 DIAGNOSIS — K92.1 HEMATOCHEZIA: ICD-10-CM

## 2025-07-03 DIAGNOSIS — R10.31 ABDOMINAL PAIN, ACUTE, RIGHT LOWER QUADRANT: ICD-10-CM

## 2025-08-30 ENCOUNTER — TRANSCRIBE ORDERS (OUTPATIENT)
Facility: HOSPITAL | Age: 60
End: 2025-08-30

## 2025-08-30 DIAGNOSIS — Z87.891 PERSONAL HISTORY OF TOBACCO USE, PRESENTING HAZARDS TO HEALTH: Primary | ICD-10-CM

## 2025-08-30 DIAGNOSIS — F17.211 CIGARETTE NICOTINE DEPENDENCE IN REMISSION: ICD-10-CM

## (undated) DEVICE — BAND COMPR L24CM REG CLR PLAS HEMSTAT EXT HK AND LOOP RETEN

## (undated) DEVICE — ELECTRODE PT RET AD L9FT HI MOIST COND ADH HYDRGEL CORDED

## (undated) DEVICE — CUFF BLD PRSS AD CLTH SGL TB W/ BAYNT CONN ROUNDED CORNER

## (undated) DEVICE — SPLINT WR POS F/ARTERIAL ACC -- BX/10

## (undated) DEVICE — GLIDESHEATH SLENDER ACCESS KIT: Brand: GLIDESHEATH SLENDER

## (undated) DEVICE — CONTAINER SPEC 20 ML LID NEUT BUFF FORMALIN 10 % POLYPR STS

## (undated) DEVICE — SYRINGE ANGIO 10 CC BRL STD PRNT POLYCARB LT BLU MEDALLION

## (undated) DEVICE — ENDOSCOPIC KIT COMPLIANCE ENDOKIT

## (undated) DEVICE — KIT ANGIOGRAPHY CUST MRMC

## (undated) DEVICE — FORCEPS BX L240CM JAW DIA2.4MM ORNG L CAP W/ NDL DISP RAD

## (undated) DEVICE — RADIFOCUS OPTITORQUE ANGIOGRAPHIC CATHETER: Brand: OPTITORQUE

## (undated) DEVICE — HEART CATH-MRMC: Brand: MEDLINE INDUSTRIES, INC.

## (undated) DEVICE — ROSEN CURVED WIRE GUIDE: Brand: ROSEN

## (undated) DEVICE — ESOPHAGEAL BALLOON DILATATION CATHETER: Brand: CRE FIXED WIRE

## (undated) DEVICE — SET GRAV CK VLV NEEDLESS ST 3 GANGED 4WAY STPCOCK HI FLO 10

## (undated) DEVICE — SNARE ENDOSCP POLYP MED STD AD 2.4X27X240 CM 2.8 MM OVL SENS

## (undated) DEVICE — TRAP ENDOSCP POLYP 2 CHMBR DRAWER TYP

## (undated) DEVICE — IV START KIT: Brand: MEDLINE

## (undated) DEVICE — TUBING PRSS MON L6IN PVC M FEM CONN

## (undated) DEVICE — CATHETER IV 20GA L1.16IN OD1.0414-1.1176MM ID0.762-0.8382MM

## (undated) DEVICE — SYSTEM REPROC CBL 3 LD DISPOSABLE

## (undated) DEVICE — TIP SUCT TRNSPAR RIB SURF STD BLB RIG NVENT W/ 5IN1 CONN DYND50138] MEDLINE INDUSTRIES INC]